# Patient Record
Sex: FEMALE | Race: BLACK OR AFRICAN AMERICAN | Employment: FULL TIME | URBAN - METROPOLITAN AREA
[De-identification: names, ages, dates, MRNs, and addresses within clinical notes are randomized per-mention and may not be internally consistent; named-entity substitution may affect disease eponyms.]

---

## 2017-01-16 ENCOUNTER — OFFICE VISIT (OUTPATIENT)
Dept: OBGYN CLINIC | Age: 52
End: 2017-01-16

## 2017-01-16 VITALS
HEIGHT: 70 IN | SYSTOLIC BLOOD PRESSURE: 118 MMHG | WEIGHT: 229.6 LBS | BODY MASS INDEX: 32.87 KG/M2 | DIASTOLIC BLOOD PRESSURE: 74 MMHG

## 2017-01-16 DIAGNOSIS — Z71.1 WORRIED WELL: Primary | ICD-10-CM

## 2017-01-16 DIAGNOSIS — R63.5 WEIGHT GAIN: ICD-10-CM

## 2017-01-16 LAB
HCG URINE, QL. (POC): NEGATIVE
VALID INTERNAL CONTROL?: YES

## 2017-01-16 NOTE — PROGRESS NOTES
Chief Complaint   Menstrual Problem      HPI  Zaira Cabral is a 49 y.o. female who presents for the evaluation of missed period, menopausal symptoms, and weight gain. Patient requested pregnancy test due to missing some birth control pills. Pregnancy test negative, 1/16/17   Patient's last menstrual period was 10/15/2016 (approximate). Per patient sometime in October, uncertain of date. The patient complains of not feeling like herself. She is doing great on the OC - no bleeding. She has noted hair loss. Has not had TSH checked. 18# weight gain since August    Past Medical History   Diagnosis Date    Anemia     Hypertension     Menorrhagia      Past Surgical History   Procedure Laterality Date    Hx gastric bypass  2005     Social History     Occupational History    Not on file. Social History Main Topics    Smoking status: Never Smoker    Smokeless tobacco: Never Used    Alcohol use No    Drug use: No    Sexual activity: Yes     Partners: Male     Birth control/ protection: Pill     Family History   Problem Relation Age of Onset    Hypertension Mother     Diabetes Mother     Cancer Father      prostate    Breast Cancer Paternal Aunt        No Known Allergies  Prior to Admission medications    Medication Sig Start Date End Date Taking? Authorizing Provider   norethindrone-e estradiol-iron (LOMEDIA 24 FE) 1 mg-20 mcg (24)/75 mg (4) tab Take 1 Tab by mouth daily. Start pill on 9/4/2016 8/26/16  Yes Darryle Aquas, MD   topiramate (TOPAMAX) 25 mg tablet Take 1 Tab by mouth daily (with dinner). 6/28/16  Yes Barbie Dalal MD   ferrous sulfate (IRON) 325 mg (65 mg iron) EC tablet Take 1 Tab by mouth three (3) times daily (with meals). 4/6/16  Yes Barbie Dalal MD   triamterene-hydrochlorothiazide (MAXZIDE) 75-50 mg per tablet Take 1 Tab by mouth daily.  3/29/16  Yes Barbie Dalal MD        Review of Systems: History obtained from the patient  Constitutional: negative for weight loss, fever, night sweats  Breast: negative for breast lumps, nipple discharge, galactorrhea  GI: negative for change in bowel habits, abdominal pain, black or bloody stools  : negative for frequency, dysuria, hematuria, vaginal discharge  MSK: negative for back pain, joint pain, muscle pain  Skin: negative for itching, rash, hives  Psych: negative for anxiety, depression, change in mood      Objective:  Visit Vitals    /74    Ht 5' 10\" (1.778 m)    Wt 229 lb 9.6 oz (104.1 kg)    LMP 10/15/2016 (Approximate)    BMI 32.94 kg/m2       Physical Exam:   PHYSICAL EXAMINATION    Constitutional  · Appearance: well-nourished, well developed, alert, in no acute distress    Skin  · General Inspection: no rash, no lesions identified    Neurologic/Psychiatric  · Mental Status:  · Orientation: grossly oriented to person, place and time  · Mood and Affect: mood normal, affect appropriate  Results for orders placed or performed in visit on 01/16/17   AMB POC URINE PREGNANCY TEST, VISUAL COLOR COMPARISON   Result Value Ref Range    VALID INTERNAL CONTROL POC Yes     HCG urine, Ql. (POC) Negative Negative       Assessment:   Worried well  Weight gain  Plan:   TSH  Cont OC - doing great  Advised many of her sx are age related and OC will cover up all menopausal sx      RTO prn if symptoms persist or worsen. Instructions given to pt. Handouts given to pt.     15 minutes was spent face to face with patient and >50% was spent counseling

## 2017-01-17 LAB — TSH SERPL DL<=0.005 MIU/L-ACNC: 0.92 UIU/ML (ref 0.45–4.5)

## 2017-01-20 ENCOUNTER — TELEPHONE (OUTPATIENT)
Dept: OBGYN CLINIC | Age: 52
End: 2017-01-20

## 2017-01-20 NOTE — TELEPHONE ENCOUNTER
Pt called to obtain her TSH results. Results were given per MD recommendation. Pt stated she will contact  through [address] because she was not understanding the results. I acknowledged understanding.

## 2017-03-21 ENCOUNTER — OFFICE VISIT (OUTPATIENT)
Dept: FAMILY MEDICINE CLINIC | Age: 52
End: 2017-03-21

## 2017-03-21 VITALS
OXYGEN SATURATION: 98 % | HEIGHT: 70 IN | RESPIRATION RATE: 18 BRPM | HEART RATE: 100 BPM | BODY MASS INDEX: 33.79 KG/M2 | WEIGHT: 236 LBS | DIASTOLIC BLOOD PRESSURE: 71 MMHG | TEMPERATURE: 98 F | SYSTOLIC BLOOD PRESSURE: 110 MMHG

## 2017-03-21 DIAGNOSIS — D50.0 IRON DEFICIENCY ANEMIA DUE TO CHRONIC BLOOD LOSS: ICD-10-CM

## 2017-03-21 DIAGNOSIS — Z13.220 SCREENING FOR HYPERLIPIDEMIA: ICD-10-CM

## 2017-03-21 DIAGNOSIS — Z98.84 STATUS POST GASTRIC BYPASS FOR OBESITY: ICD-10-CM

## 2017-03-21 DIAGNOSIS — I10 ESSENTIAL HYPERTENSION: ICD-10-CM

## 2017-03-21 DIAGNOSIS — L91.0 KELOID: Primary | ICD-10-CM

## 2017-03-21 RX ORDER — TRIAMTERENE AND HYDROCHLOROTHIAZIDE 75; 50 MG/1; MG/1
1 TABLET ORAL DAILY
Qty: 90 TAB | Refills: 3 | Status: SHIPPED | OUTPATIENT
Start: 2017-03-21 | End: 2018-01-05 | Stop reason: SDUPTHER

## 2017-03-21 RX ORDER — FERROUS SULFATE 325(65) MG
325 TABLET, DELAYED RELEASE (ENTERIC COATED) ORAL
Qty: 90 TAB | Refills: 3 | Status: SHIPPED | OUTPATIENT
Start: 2017-03-21 | End: 2018-03-31 | Stop reason: SDUPTHER

## 2017-03-21 NOTE — PROGRESS NOTES
1. Have you been to the ER, urgent care clinic since your last visit? Hospitalized since your last visit? No    2. Have you seen or consulted any other health care providers outside of the [address] [place] [address] since your last visit? Include any pap smears or colon screening.  No   Chief Complaint   Patient presents with    Medication Refill    Referral Request

## 2017-03-21 NOTE — MR AVS SNAPSHOT
Visit Information Date & Time Provider Department Dept. Phone Encounter #  
 3/21/2017  2:45 PM Tiesha Young MD [address] [place] [address] [id number] Follow-up Instructions Return in about 3 months (around 6/21/2017). Your Appointments 4/28/2017  7:40 AM  
MAMMOGRAPHY with MAMMOGRAM, KARTHIK Brooks ([facility]) Appt Note: Mammo + Dr. Rangel Carrolldle [address] [place] [address]  
  
    
 4/28/2017  8:00 AM  
ESTABLISHED PATIENT with MD Chang Ames ([facility]) Appt Note: Mammo + Dr. Rangel Carrolldle [address] [facility] [id number]  
[facility] [address] Upcoming Health Maintenance Date Due DTaP/Tdap/Td series (1 - Tdap) 8/22/1987 INFLUENZA AGE 9 TO ADULT 8/1/2016 FOBT Q 1 YEAR AGE 50-75 8/22/2016 BREAST CANCER SCRN MAMMOGRAM 4/22/2017 PAP AKA CERVICAL CYTOLOGY 4/22/2021 Allergies as of 3/21/2017  Review Complete On: 3/21/2017 By: Tiesha Young MD  
 No Known Allergies Current Immunizations  Reviewed on 7/31/2016 No immunizations on file. Not reviewed this visit You Were Diagnosed With   
  
 Codes Comments Keloid    -  Primary ICD-10-CM: L91.0 ICD-9-CM: 701.4 Iron deficiency anemia due to chronic blood loss     ICD-10-CM: D50.0 ICD-9-CM: 280.0 Essential hypertension     ICD-10-CM: I10 
ICD-9-CM: 401.9 Status post gastric bypass for obesity     ICD-10-CM: Z98.84 ICD-9-CM: V45.86 Screening for hyperlipidemia     ICD-10-CM: Z13.220 ICD-9-CM: V77.91 Vitals BP Pulse Temp Resp Height(growth percentile) Weight(growth percentile)  110/71 (BP 1 Location: Right arm, BP Patient Position: Sitting) 100 98 °F (36.7 °C) (Oral) 18 5' 10\" (1.778 m) 236 lb (107 kg) SpO2 BMI OB Status Smoking Status 98% 33.86 kg/m2 Unknown Never Smoker BMI and BSA Data Body Mass Index Body Surface Area  
 33.86 kg/m 2 2.3 m 2 Preferred Pharmacy Pharmacy Name Phone [facility] [address] [place] [address] [phone] Your Updated Medication List  
  
   
This list is accurate as of: 3/21/17  3:37 PM.  Always use your most recent med list.  
  
  
  
  
 ferrous sulfate 325 mg (65 mg iron) EC tablet Commonly known as:  IRON Take 1 Tab by mouth three (3) times daily (with meals). norethindrone-e estradiol-iron 1 mg-20 mcg (24)/75 mg (4) Tab Commonly known as:  LOMEDIA 24 FE Take 1 Tab by mouth daily. topiramate 25 mg tablet Commonly known as:  TOPAMAX Take 1 Tab by mouth daily (with dinner). triamterene-hydroCHLOROthiazide 75-50 mg per tablet Commonly known as:  Audria Abed Take 1 Tab by mouth daily. Prescriptions Sent to Pharmacy Refills  
 ferrous sulfate (IRON) 325 mg (65 mg iron) EC tablet 3 Sig: Take 1 Tab by mouth three (3) times daily (with meals). Class: Normal  
 Pharmacy: [facility] [address] [place] [address] Ph #: [phone] Route: Oral  
 triamterene-hydroCHLOROthiazide (MAXZIDE) 75-50 mg per tablet 3 Sig: Take 1 Tab by mouth daily. Class: Normal  
 [facility] [address] [place] [address] Ph #: [phone] Route: Oral  
  
We Performed the Following CBC WITH AUTOMATED DIFF [34606 CPT(R)] LIPID PANEL [04329 CPT(R)] REFERRAL TO DERMATOLOGY [REF19 Custom] Comments:  
 eval and treat for keloids Follow-up Instructions Return in about 3 months (around 6/21/2017). Referral Information Referral ID Referred By Referred To  
  
 [id number] Terence soto [facility] W Diana Pollack MD   
   [address] [place] [address] [address] [facility], [address] Phone: [phone] Fax: [phone] Visits Status Start Date End Date 1 New Request 3/21/17 3/21/18 If your referral has a status of pending review or denied, additional information will be sent to support the outcome of this decision. Introducing [facility] SERVICES! Dear Lelo Nunes: Thank you for requesting a [facility] account. Our records indicate that you already have an active [facility] account. You can access your account anytime at [email or web address] Did you know that you can access your hospital and ER discharge instructions at any time in [facility]? You can also review all of your test results from your hospital stay or ER visit. Additional Information If you have questions, please visit the Frequently Asked Questions section of the [employer] [facility] website at [email or web address] Remember, [facility] is NOT to be used for urgent needs. For medical emergencies, dial 911. Now available from your iPhone and Android! Please provide this summary of care documentation to your next provider. Your primary care clinician is listed as Sharath Gonzalez. If you have any questions after today's visit, please call [phone].

## 2017-03-21 NOTE — PROGRESS NOTES
Chief Complaint   Patient presents with    Medication Refill    Referral Request     she is a 49y.o. year old female who presents for evalution. She needs a refill on  Also asking for a referral to derm for keloids    Reviewed PmHx, RxHx, FmHx, SocHx, AllgHx and updated and dated in the chart. Patient Active Problem List    Diagnosis    Uterine leiomyoma    Essential hypertension    Status post gastric bypass for obesity    Cheloid scar       Nurse notes were reviewed and copied and are correct  Review of Systems - negative except as listed above in the HPI    Objective:     Vitals:    03/21/17 1505   BP: 110/71   Pulse: 100   Resp: 18   Temp: 98 °F (36.7 °C)   TempSrc: Oral   SpO2: 98%   Weight: 236 lb (107 kg)   Height: 5' 10\" (1.778 m)       Physical Examination: General appearance - alert, well appearing, and in no distress  Mental status - alert, oriented to person, place, and time  Eye  perrl  Chest - clear to auscultation, no wheezes, rales or rhonchi, symmetric air entry  Heart - normal rate, regular rhythm, normal S1, S2, no murmurs, rubs, clicks or gallops  Skin - multiple keloids on the abdomen  Ext: no edema  Msk: no deformity  Assessment/ Plan:   Lelo Nunes was seen today for medication refill and referral request.    Diagnoses and all orders for this visit:    Keloid  -     REFERRAL TO DERMATOLOGY    Iron deficiency anemia due to chronic blood loss  -     ferrous sulfate (IRON) 325 mg (65 mg iron) EC tablet; Take 1 Tab by mouth three (3) times daily (with meals). -     CBC WITH AUTOMATED DIFF    Essential hypertension  -     triamterene-hydroCHLOROthiazide (MAXZIDE) 75-50 mg per tablet; Take 1 Tab by mouth daily. Status post gastric bypass for obesity  She is not working on the weight loss now  Screening for hyperlipidemia  -     LIPID PANEL       Follow-up Disposition:  Return in about 3 months (around 6/21/2017). ICD-10-CM ICD-9-CM    1. Keloid L91.0 701.4 REFERRAL TO DERMATOLOGY   2. Iron deficiency anemia due to chronic blood loss D50.0 280.0 ferrous sulfate (IRON) 325 mg (65 mg iron) EC tablet      CBC WITH AUTOMATED DIFF   3. Essential hypertension I10 401.9 triamterene-hydroCHLOROthiazide (MAXZIDE) 75-50 mg per tablet   4. Status post gastric bypass for obesity Z98.84 V45.86    5. Screening for hyperlipidemia Z13.220 V77.91 LIPID PANEL       I have discussed the diagnosis with the patient and the intended plan as seen in the above orders. The patient has received an after-visit summary and questions were answered concerning future plans. Medication Side Effects and Warnings were discussed with patient: yes  Patient Labs were reviewed and or requested: yes  Patient Past Records were reviewed and or requested: yes        There are no Patient Instructions on file for this visit.     The patient verbalizes understanding and agrees with the plan of care        Patient has the advanced directives booklet to review

## 2017-03-22 LAB
BASOPHILS # BLD AUTO: 0 X10E3/UL (ref 0–0.2)
BASOPHILS NFR BLD AUTO: 0 %
CHOLEST SERPL-MCNC: 159 MG/DL (ref 100–199)
EOSINOPHIL # BLD AUTO: 0.1 X10E3/UL (ref 0–0.4)
EOSINOPHIL NFR BLD AUTO: 1 %
ERYTHROCYTE [DISTWIDTH] IN BLOOD BY AUTOMATED COUNT: 14.4 % (ref 12.3–15.4)
HCT VFR BLD AUTO: 41.1 % (ref 34–46.6)
HDLC SERPL-MCNC: 60 MG/DL
HGB BLD-MCNC: 13.5 G/DL (ref 11.1–15.9)
IMM GRANULOCYTES # BLD: 0 X10E3/UL (ref 0–0.1)
IMM GRANULOCYTES NFR BLD: 0 %
INTERPRETATION, 910389: NORMAL
LDLC SERPL CALC-MCNC: 82 MG/DL (ref 0–99)
LYMPHOCYTES # BLD AUTO: 1.8 X10E3/UL (ref 0.7–3.1)
LYMPHOCYTES NFR BLD AUTO: 24 %
MCH RBC QN AUTO: 29 PG (ref 26.6–33)
MCHC RBC AUTO-ENTMCNC: 32.8 G/DL (ref 31.5–35.7)
MCV RBC AUTO: 88 FL (ref 79–97)
MONOCYTES # BLD AUTO: 0.4 X10E3/UL (ref 0.1–0.9)
MONOCYTES NFR BLD AUTO: 5 %
NEUTROPHILS # BLD AUTO: 5.1 X10E3/UL (ref 1.4–7)
NEUTROPHILS NFR BLD AUTO: 70 %
PLATELET # BLD AUTO: 311 X10E3/UL (ref 150–379)
RBC # BLD AUTO: 4.66 X10E6/UL (ref 3.77–5.28)
TRIGL SERPL-MCNC: 83 MG/DL (ref 0–149)
VLDLC SERPL CALC-MCNC: 17 MG/DL (ref 5–40)
WBC # BLD AUTO: 7.4 X10E3/UL (ref 3.4–10.8)

## 2017-06-14 ENCOUNTER — OFFICE VISIT (OUTPATIENT)
Dept: FAMILY MEDICINE CLINIC | Age: 52
End: 2017-06-14

## 2017-06-14 VITALS
RESPIRATION RATE: 14 BRPM | HEART RATE: 77 BPM | HEIGHT: 70 IN | TEMPERATURE: 98.2 F | OXYGEN SATURATION: 96 % | WEIGHT: 231 LBS | SYSTOLIC BLOOD PRESSURE: 133 MMHG | DIASTOLIC BLOOD PRESSURE: 58 MMHG | BODY MASS INDEX: 33.07 KG/M2

## 2017-06-14 DIAGNOSIS — N91.2 AMENORRHEA: ICD-10-CM

## 2017-06-14 DIAGNOSIS — E55.9 VITAMIN D DEFICIENCY: ICD-10-CM

## 2017-06-14 DIAGNOSIS — R63.5 WEIGHT GAIN: ICD-10-CM

## 2017-06-14 DIAGNOSIS — M54.2 POSTERIOR NECK PAIN: ICD-10-CM

## 2017-06-14 DIAGNOSIS — I10 ESSENTIAL HYPERTENSION: ICD-10-CM

## 2017-06-14 DIAGNOSIS — R53.83 FATIGUE, UNSPECIFIED TYPE: Primary | ICD-10-CM

## 2017-06-14 LAB
HCG URINE, QL. (POC): NEGATIVE
VALID INTERNAL CONTROL?: YES

## 2017-06-14 RX ORDER — NAPROXEN 500 MG/1
500 TABLET ORAL 2 TIMES DAILY WITH MEALS
Qty: 30 TAB | Refills: 1 | Status: SHIPPED | OUTPATIENT
Start: 2017-06-14 | End: 2017-06-21

## 2017-06-14 RX ORDER — CYCLOBENZAPRINE HCL 5 MG
5 TABLET ORAL
Qty: 20 TAB | Refills: 0 | Status: SHIPPED | OUTPATIENT
Start: 2017-06-14 | End: 2017-09-26

## 2017-06-14 NOTE — PATIENT INSTRUCTIONS
Neck Pain: Care Instructions  Your Care Instructions  You can have neck pain anywhere from the bottom of your head to the top of your shoulders. It can spread to the upper back or arms. Injuries, painting a ceiling, sleeping with your neck twisted, staying in one position for too long, and many other activities can cause neck pain. Most neck pain gets better with home care. Your doctor may recommend medicine to relieve pain or relax your muscles. He or she may suggest exercise and physical therapy to increase flexibility and relieve stress. You may need to wear a special (cervical) collar to support your neck for a day or two. Follow-up care is a key part of your treatment and safety. Be sure to make and go to all appointments, and call your doctor if you are having problems. It's also a good idea to know your test results and keep a list of the medicines you take. How can you care for yourself at home? · Try using a heating pad on a low or medium setting for 15 to 20 minutes every 2 or 3 hours. Try a warm shower in place of one session with the heating pad. · You can also try an ice pack for 10 to 15 minutes every 2 to 3 hours. Put a thin cloth between the ice and your skin. · Take pain medicines exactly as directed. ¨ If the doctor gave you a prescription medicine for pain, take it as prescribed. ¨ If you are not taking a prescription pain medicine, ask your doctor if you can take an over-the-counter medicine. · If your doctor recommends a cervical collar, wear it exactly as directed. When should you call for help? Call your doctor now or seek immediate medical care if:  · You have new or worsening numbness in your arms, buttocks or legs. · You have new or worsening weakness in your arms or legs. (This could make it hard to stand up.)  · You lose control of your bladder or bowels.   Watch closely for changes in your health, and be sure to contact your doctor if:  · Your neck pain is getting worse.  · You are not getting better after 1 week. · You do not get better as expected. Where can you learn more? Go to http://zeyad-cecile.info/. Enter [id number] in the search box to learn more about \"Neck Pain: Care Instructions. \"  Current as of: May 23, 2016  Content Version: 11.2  © 4092-5547 [employer]. Care instructions adapted under license by [employer] (which disclaims liability or warranty for this information). If you have questions about a medical condition or this instruction, always ask your healthcare professional. [place] [address] any warranty or liability for your use of this information. Fatigue: Care Instructions  Your Care Instructions  Fatigue is a feeling of tiredness, exhaustion, or lack of energy. You may feel fatigue because of too much or not enough activity. It can also come from stress, lack of sleep, boredom, and poor diet. Many medical problems, such as viral infections, can cause fatigue. Emotional problems, especially depression, are often the cause of fatigue. Fatigue is most often a symptom of another problem. Treatment for fatigue depends on the cause. For example, if you have fatigue because you have a certain health problem, treating this problem also treats your fatigue. If depression or anxiety is the cause, treatment may help. Follow-up care is a key part of your treatment and safety. Be sure to make and go to all appointments, and call your doctor if you are having problems. It's also a good idea to know your test results and keep a list of the medicines you take. How can you care for yourself at home? · Get regular exercise. But don't overdo it. Go back and forth between rest and exercise. · Get plenty of rest.  · Eat a healthy diet. Do not skip meals, especially breakfast.  · Reduce your use of caffeine, tobacco, and alcohol.  Caffeine is most often found in coffee, tea, cola drinks, and chocolate. · Limit medicines that can cause fatigue. This includes tranquilizers and cold and allergy medicines. When should you call for help? Watch closely for changes in your health, and be sure to contact your doctor if:  · You have new symptoms such as fever or a rash. · Your fatigue gets worse. · You have been feeling down, depressed, or hopeless. Or you may have lost interest in things that you usually enjoy. · You are not getting better as expected. Where can you learn more? Go to http://zeyad-cecile.info/. Enter Q718 in the search box to learn more about \"Fatigue: Care Instructions. \"  Current as of: May 27, 2016  Content Version: 11.2  © 4706-8872 [employer]. Care instructions adapted under license by [employer] (which disclaims liability or warranty for this information). If you have questions about a medical condition or this instruction, always ask your healthcare professional. [place] [address] any warranty or liability for your use of this information. Starting a Weight Loss Plan: Care Instructions  Your Care Instructions  If you are thinking about losing weight, it can be hard to know where to start. Your doctor can help you set up a weight loss plan that best meets your needs. You may want to take a class on nutrition or exercise, or join a weight loss support group. If you have questions about how to make changes to your eating or exercise habits, ask your doctor about seeing a registered dietitian or an exercise specialist.  It can be a big challenge to lose weight. But you do not have to make huge changes at once. Make small changes, and stick with them. When those changes become habit, add a few more changes. If you do not think you are ready to make changes right now, try to pick a date in the future. Make an appointment to see your doctor to discuss whether the time is right for you to start a plan.   Follow-up care is a key part of your treatment and safety. Be sure to make and go to all appointments, and call your doctor if you are having problems. Its also a good idea to know your test results and keep a list of the medicines you take. How can you care for yourself at home? · Set realistic goals. Many people expect to lose much more weight than is likely. A weight loss of 5% to 10% of your body weight may be enough to improve your health. · Get family and friends involved to provide support. Talk to them about why you are trying to lose weight, and ask them to help. They can help by participating in exercise and having meals with you, even if they may be eating something different. · Find what works best for you. If you do not have time or do not like to cook, a program that offers meal replacement bars or shakes may be better for you. Or if you like to prepare meals, finding a plan that includes daily menus and recipes may be best.  · Ask your doctor about other health professionals who can help you achieve your weight loss goals. ¨ A dietitian can help you make healthy changes in your diet. ¨ An exercise specialist or  can help you develop a safe and effective exercise program.  ¨ A counselor or psychiatrist can help you cope with issues such as depression, anxiety, or family problems that can make it hard to focus on weight loss. · Consider joining a support group for people who are trying to lose weight. Your doctor can suggest groups in your area. Where can you learn more? Go to http://zeyad-cecile.info/. Enter N439 in the search box to learn more about \"Starting a Weight Loss Plan: Care Instructions. \"  Current as of: October 13, 2016  Content Version: 11.2  © 6508-1817 [employer]. Care instructions adapted under license by [employer] (which disclaims liability or warranty for this information).  If you have questions about a medical condition or this instruction, always ask your healthcare professional. [place] [address] any warranty or liability for your use of this information.

## 2017-06-14 NOTE — PROGRESS NOTES
Reports that she has been experiencing fatigue, since Saturday. Also states that she started having (R) shoulder pain early last week that has radiated down her back and causes pain when she turns her head. X 4 days. Reports (R) ear pain and scratchy throat.

## 2017-06-14 NOTE — MR AVS SNAPSHOT
Visit Information Date & Time Provider Department Dept. Phone Encounter #  
 6/14/2017 11:15 AM Loulou Madrid NP [address] [place] [address] [id number] Follow-up Instructions Return in about 2 weeks (around 6/28/2017) for f/u neck pain. Upcoming Health Maintenance Date Due DTaP/Tdap/Td series (1 - Tdap) 8/22/1987 FOBT Q 1 YEAR AGE 50-75 8/22/2016 BREAST CANCER SCRN MAMMOGRAM 4/22/2017 INFLUENZA AGE 9 TO ADULT 8/1/2017 PAP AKA CERVICAL CYTOLOGY 4/22/2021 Allergies as of 6/14/2017  Review Complete On: 6/14/2017 By: Loulou Madrid NP No Known Allergies Current Immunizations  Reviewed on 7/31/2016 No immunizations on file. Not reviewed this visit You Were Diagnosed With   
  
 Codes Comments Fatigue, unspecified type    -  Primary ICD-10-CM: R53.83 ICD-9-CM: 780.79 BMI 33.0-33.9,adult     ICD-10-CM: P67.32 
ICD-9-CM: V85.33 Essential hypertension     ICD-10-CM: I10 
ICD-9-CM: 401.9 Posterior neck pain     ICD-10-CM: M54.2 ICD-9-CM: 723.1 Amenorrhea     ICD-10-CM: N91.2 ICD-9-CM: 626.0 Weight gain     ICD-10-CM: R63.5 ICD-9-CM: 783.1 Vitals BP Pulse Temp Resp Height(growth percentile) Weight(growth percentile) 133/58 77 98.2 °F (36.8 °C) 14 5' 10\" (1.778 m) 231 lb (104.8 kg) SpO2 BMI OB Status Smoking Status 96% 33.15 kg/m2 Unknown Never Smoker Vitals History BMI and BSA Data Body Mass Index Body Surface Area  
 33.15 kg/m 2 2.28 m 2 Preferred Pharmacy Pharmacy Name Phone [facility] [address], [address] [phone] Your Updated Medication List  
  
   
This list is accurate as of: 6/14/17 12:01 PM.  Always use your most recent med list.  
  
  
  
  
 cyclobenzaprine 5 mg tablet Commonly known as:  FLEXERIL Take 1 Tab by mouth nightly as needed for Muscle Spasm(s). ferrous sulfate 325 mg (65 mg iron) EC tablet Commonly known as:  IRON Take 1 Tab by mouth three (3) times daily (with meals). naproxen 500 mg tablet Commonly known as:  NAPROSYN Take 1 Tab by mouth two (2) times daily (with meals) for 7 days. norethindrone-e estradiol-iron 1 mg-20 mcg (24)/75 mg (4) Tab Commonly known as:  LOMEDIA 24 FE Take 1 Tab by mouth daily. topiramate 25 mg tablet Commonly known as:  TOPAMAX Take 1 Tab by mouth daily (with dinner). triamterene-hydroCHLOROthiazide 75-50 mg per tablet Commonly known as:  Wimberley Hotter Take 1 Tab by mouth daily. Prescriptions Sent to Pharmacy Refills  
 naproxen (NAPROSYN) 500 mg tablet 1 Sig: Take 1 Tab by mouth two (2) times daily (with meals) for 7 days. Class: Normal  
 Pharmacy: [facility] [address] [place] [address], [address] Ph #: [phone] Route: Oral  
 cyclobenzaprine (FLEXERIL) 5 mg tablet 0 Sig: Take 1 Tab by mouth nightly as needed for Muscle Spasm(s). Class: Normal  
 Pharmacy: [facility] [address] [place] [address], [address] Ph #: [phone] Route: Oral  
  
We Performed the Following AMB POC URINE PREGNANCY TEST, VISUAL COLOR COMPARISON [82081 CPT(R)] CBC WITH AUTOMATED DIFF [18560 CPT(R)] SLEEP MEDICINE REFERRAL [[id number] Custom] Comments:  
 Please evaluate patient for poor sleep pattern. TSH 3RD GENERATION [64749 CPT(R)] VITAMIN B12 A504410 CPT(R)] VITAMIN D, 25 HYDROXY C7322477 CPT(R)] Follow-up Instructions Return in about 2 weeks (around 6/28/2017) for f/u neck pain. Referral Information Referral ID Referred By Referred To  
  
 [id number] Violeta Woodall, [facility] Korey Ferreira  Phone: [phone] Fax: [phone] Visits Status Start Date End Date 1 New Request 6/14/17 6/14/18 If your referral has a status of pending review or denied, additional information will be sent to support the outcome of this decision. Patient Instructions Neck Pain: Care Instructions Your Care Instructions You can have neck pain anywhere from the bottom of your head to the top of your shoulders. It can spread to the upper back or arms. Injuries, painting a ceiling, sleeping with your neck twisted, staying in one position for too long, and many other activities can cause neck pain. Most neck pain gets better with home care. Your doctor may recommend medicine to relieve pain or relax your muscles. He or she may suggest exercise and physical therapy to increase flexibility and relieve stress. You may need to wear a special (cervical) collar to support your neck for a day or two. Follow-up care is a key part of your treatment and safety. Be sure to make and go to all appointments, and call your doctor if you are having problems. It's also a good idea to know your test results and keep a list of the medicines you take. How can you care for yourself at home? · Try using a heating pad on a low or medium setting for 15 to 20 minutes every 2 or 3 hours. Try a warm shower in place of one session with the heating pad. · You can also try an ice pack for 10 to 15 minutes every 2 to 3 hours. Put a thin cloth between the ice and your skin. · Take pain medicines exactly as directed. ¨ If the doctor gave you a prescription medicine for pain, take it as prescribed. ¨ If you are not taking a prescription pain medicine, ask your doctor if you can take an over-the-counter medicine. · If your doctor recommends a cervical collar, wear it exactly as directed. When should you call for help? Call your doctor now or seek immediate medical care if: 
· You have new or worsening numbness in your arms, buttocks or legs. · You have new or worsening weakness in your arms or legs.  (This could make it hard to stand up.) · You lose control of your bladder or bowels. Watch closely for changes in your health, and be sure to contact your doctor if: 
· Your neck pain is getting worse. · You are not getting better after 1 week. · You do not get better as expected. Where can you learn more? Go to http://zeyad-cecile.info/. Enter [id number] in the search box to learn more about \"Neck Pain: Care Instructions. \" Current as of: May 23, 2016 Content Version: 11.2 © 0215-2239 Public Good Software. Care instructions adapted under license by [employer] IT (which disclaims liability or warranty for this information). If you have questions about a medical condition or this instruction, always ask your healthcare professional. [address] any warranty or liability for your use of this information. Fatigue: Care Instructions Your Care Instructions Fatigue is a feeling of tiredness, exhaustion, or lack of energy. You may feel fatigue because of too much or not enough activity. It can also come from stress, lack of sleep, boredom, and poor diet. Many medical problems, such as viral infections, can cause fatigue. Emotional problems, especially depression, are often the cause of fatigue. Fatigue is most often a symptom of another problem. Treatment for fatigue depends on the cause. For example, if you have fatigue because you have a certain health problem, treating this problem also treats your fatigue. If depression or anxiety is the cause, treatment may help. Follow-up care is a key part of your treatment and safety. Be sure to make and go to all appointments, and call your doctor if you are having problems. It's also a good idea to know your test results and keep a list of the medicines you take. How can you care for yourself at home? · Get regular exercise. But don't overdo it. Go back and forth between rest and exercise.  
· Get plenty of rest. 
 · Eat a healthy diet. Do not skip meals, especially breakfast. 
· Reduce your use of caffeine, tobacco, and alcohol. Caffeine is most often found in coffee, tea, cola drinks, and chocolate. · Limit medicines that can cause fatigue. This includes tranquilizers and cold and allergy medicines. When should you call for help? Watch closely for changes in your health, and be sure to contact your doctor if: 
· You have new symptoms such as fever or a rash. · Your fatigue gets worse. · You have been feeling down, depressed, or hopeless. Or you may have lost interest in things that you usually enjoy. · You are not getting better as expected. Where can you learn more? Go to http://zeyad-cecile.info/. Enter P124 in the search box to learn more about \"Fatigue: Care Instructions. \" Current as of: May 27, 2016 Content Version: 11.2 © 8968-3808 [employer]Loop. Care instructions adapted under license by [employer] (which disclaims liability or warranty for this information). If you have questions about a medical condition or this instruction, always ask your healthcare professional. [place] [address] any warranty or liability for your use of this information. Starting a Weight Loss Plan: Care Instructions Your Care Instructions If you are thinking about losing weight, it can be hard to know where to start. Your doctor can help you set up a weight loss plan that best meets your needs. You may want to take a class on nutrition or exercise, or join a weight loss support group. If you have questions about how to make changes to your eating or exercise habits, ask your doctor about seeing a registered dietitian or an exercise specialist. 
It can be a big challenge to lose weight. But you do not have to make huge changes at once. Make small changes, and stick with them. When those changes become habit, add a few more changes. If you do not think you are ready to make changes right now, try to pick a date in the future. Make an appointment to see your doctor to discuss whether the time is right for you to start a plan. Follow-up care is a key part of your treatment and safety. Be sure to make and go to all appointments, and call your doctor if you are having problems. Its also a good idea to know your test results and keep a list of the medicines you take. How can you care for yourself at home? · Set realistic goals. Many people expect to lose much more weight than is likely. A weight loss of 5% to 10% of your body weight may be enough to improve your health. · Get family and friends involved to provide support. Talk to them about why you are trying to lose weight, and ask them to help. They can help by participating in exercise and having meals with you, even if they may be eating something different. · Find what works best for you. If you do not have time or do not like to cook, a program that offers meal replacement bars or shakes may be better for you. Or if you like to prepare meals, finding a plan that includes daily menus and recipes may be best. 
· Ask your doctor about other health professionals who can help you achieve your weight loss goals. ¨ A dietitian can help you make healthy changes in your diet. ¨ An exercise specialist or  can help you develop a safe and effective exercise program. 
¨ A counselor or psychiatrist can help you cope with issues such as depression, anxiety, or family problems that can make it hard to focus on weight loss. · Consider joining a support group for people who are trying to lose weight. Your doctor can suggest groups in your area. Where can you learn more? Go to http://zeyad-cecile.info/. Enter C337 in the search box to learn more about \"Starting a Weight Loss Plan: Care Instructions. \" Current as of: October 13, 2016 Content Version: 11.2 © 9430-8607 Healthwise, Incorporated. Care instructions adapted under license by Loudie (which disclaims liability or warranty for this information). If you have questions about a medical condition or this instruction, always ask your healthcare professional. [address] any warranty or liability for your use of this information. Introducing [facility]! Dear Jose Hawley: Thank you for requesting a VitalFields account. Our records indicate that you already have an active VitalFields account. You can access your account anytime at [email or web address] Did you know that you can access your hospital and ER discharge instructions at any time in VitalFields? You can also review all of your test results from your hospital stay or ER visit. Additional Information If you have questions, please visit the Frequently Asked Questions section of the VitalFields website at [email or web address] Remember, VitalFields is NOT to be used for urgent needs. For medical emergencies, dial 911. Now available from your iPhone and Android! Please provide this summary of care documentation to your next provider. Your primary care clinician is listed as Beba Bunch. If you have any questions after today's visit, please call [phone].

## 2017-06-15 LAB
25(OH)D3+25(OH)D2 SERPL-MCNC: 10.2 NG/ML (ref 30–100)
BASOPHILS # BLD AUTO: 0.1 X10E3/UL (ref 0–0.2)
BASOPHILS NFR BLD AUTO: 1 %
EOSINOPHIL # BLD AUTO: 0.1 X10E3/UL (ref 0–0.4)
EOSINOPHIL NFR BLD AUTO: 2 %
ERYTHROCYTE [DISTWIDTH] IN BLOOD BY AUTOMATED COUNT: 14.5 % (ref 12.3–15.4)
HCT VFR BLD AUTO: 39.8 % (ref 34–46.6)
HGB BLD-MCNC: 13 G/DL (ref 11.1–15.9)
IMM GRANULOCYTES # BLD: 0 X10E3/UL (ref 0–0.1)
IMM GRANULOCYTES NFR BLD: 0 %
LYMPHOCYTES # BLD AUTO: 1.6 X10E3/UL (ref 0.7–3.1)
LYMPHOCYTES NFR BLD AUTO: 19 %
MCH RBC QN AUTO: 28.8 PG (ref 26.6–33)
MCHC RBC AUTO-ENTMCNC: 32.7 G/DL (ref 31.5–35.7)
MCV RBC AUTO: 88 FL (ref 79–97)
MONOCYTES # BLD AUTO: 0.6 X10E3/UL (ref 0.1–0.9)
MONOCYTES NFR BLD AUTO: 8 %
NEUTROPHILS # BLD AUTO: 6.1 X10E3/UL (ref 1.4–7)
NEUTROPHILS NFR BLD AUTO: 70 %
PLATELET # BLD AUTO: 305 X10E3/UL (ref 150–379)
RBC # BLD AUTO: 4.52 X10E6/UL (ref 3.77–5.28)
TSH SERPL DL<=0.005 MIU/L-ACNC: 0.9 UIU/ML (ref 0.45–4.5)
VIT B12 SERPL-MCNC: 155 PG/ML (ref 211–946)
WBC # BLD AUTO: 8.5 X10E3/UL (ref 3.4–10.8)

## 2017-06-16 ENCOUNTER — CLINICAL SUPPORT (OUTPATIENT)
Dept: FAMILY MEDICINE CLINIC | Age: 52
End: 2017-06-16

## 2017-06-16 DIAGNOSIS — E53.8 VITAMIN B 12 DEFICIENCY: Primary | ICD-10-CM

## 2017-06-16 RX ORDER — CYANOCOBALAMIN 1000 UG/ML
1000 INJECTION, SOLUTION INTRAMUSCULAR; SUBCUTANEOUS ONCE
Qty: 1 ML | Refills: 0
Start: 2017-06-16 | End: 2017-06-16

## 2017-06-16 RX ORDER — ERGOCALCIFEROL 1.25 MG/1
50000 CAPSULE ORAL
Qty: 12 CAP | Refills: 0 | Status: SHIPPED | OUTPATIENT
Start: 2017-06-16 | End: 2018-06-15 | Stop reason: SDUPTHER

## 2017-06-16 NOTE — PROGRESS NOTES
b12 is low- recommend trying injections once monthly to improve levels. Make appt for nurse visit for 1st adminstration. Vit D low- rx for weekly high dose replacement sent to pharmacy. Take x 12 weeks, then return to recheck blood levels.   Your CBC is normal.  Thyroid is also normal.

## 2017-07-14 ENCOUNTER — CLINICAL SUPPORT (OUTPATIENT)
Dept: FAMILY MEDICINE CLINIC | Age: 52
End: 2017-07-14

## 2017-07-14 DIAGNOSIS — E53.8 VITAMIN B12 DEFICIENCY: Primary | ICD-10-CM

## 2017-07-14 RX ORDER — CYANOCOBALAMIN 1000 UG/ML
1000 INJECTION, SOLUTION INTRAMUSCULAR; SUBCUTANEOUS ONCE
Qty: 1 ML | Refills: 0
Start: 2017-07-14 | End: 2017-08-28 | Stop reason: SDUPTHER

## 2017-07-14 NOTE — PROGRESS NOTES
After obtaining consent, and per orders of VINNY Lim. , injection of cyanocobalamin 1,000 mcg given by Lottie Fields LPN. Patient instructed to remain in clinic for 10 minutes afterwards, and to report any adverse reaction to me immediately.

## 2017-08-07 ENCOUNTER — TELEPHONE (OUTPATIENT)
Dept: FAMILY MEDICINE CLINIC | Age: 52
End: 2017-08-07

## 2017-08-09 ENCOUNTER — HOSPITAL ENCOUNTER (EMERGENCY)
Age: 52
Discharge: HOME | End: 2017-08-09
Attending: EMERGENCY MEDICINE
Payer: COMMERCIAL

## 2017-08-09 ENCOUNTER — HOSPITAL ENCOUNTER (EMERGENCY)
Age: 52
Discharge: HOME | End: 2017-08-09
Attending: FAMILY MEDICINE

## 2017-08-09 VITALS
WEIGHT: 237.22 LBS | HEART RATE: 79 BPM | OXYGEN SATURATION: 99 % | DIASTOLIC BLOOD PRESSURE: 67 MMHG | BODY MASS INDEX: 33.96 KG/M2 | RESPIRATION RATE: 18 BRPM | SYSTOLIC BLOOD PRESSURE: 114 MMHG | TEMPERATURE: 98 F | HEIGHT: 70 IN

## 2017-08-09 DIAGNOSIS — M71.22 BAKER CYST, LEFT: ICD-10-CM

## 2017-08-09 DIAGNOSIS — M25.562 ARTHRALGIA OF LEFT LOWER LEG: Primary | ICD-10-CM

## 2017-08-09 PROCEDURE — 93971 EXTREMITY STUDY: CPT

## 2017-08-09 PROCEDURE — 99281 EMR DPT VST MAYX REQ PHY/QHP: CPT

## 2017-08-09 NOTE — PROCEDURES
[facility]  *** FINAL REPORT ***    Name: Claudette Cardenas  MRN: [id number]    Outpatient  : 22 Aug 1965  HIS Order #: [id number]  [address] [facility] Visit #: [id number]  Date: 09 Aug 2017    TYPE OF TEST: Peripheral Venous Testing    REASON FOR TEST  Pain in limb, Limb swelling    Left Leg:-  Deep venous thrombosis:           No  Superficial venous thrombosis:    No  Deep venous insufficiency:        No  Superficial venous insufficiency: No      INTERPRETATION/FINDINGS  PROCEDURE:  LEFT LOWER EXTREMITY VENOUS DUPLEX . Evaluation of lower  extremity veins with ultrasound (B-mode imaging, pulsed Doppler, color   Doppler). Includes the common femoral, deep femoral, femoral,  popliteal, posterior tibial, peroneal, and great saphenous veins. Other veins, for example the gastrocnemius and soleal veins, may also  be visualized. FINDINGS: Technically difficult study due to patient body habitus. Gray scale imaging supoptimal. Adamaris Nyhan scale and color flow duplex images   of the veins in the left lower extremity demonstrate normal  compressibility, spontaneous and augmented flow profiles, and absence  of filling defects throughout the deep and superficial veins in the  left lower extremity. CONCLUSION: Left lower extremity venous duplex negative for deep  venous thrombosis or thrombophlebitis. Right common femoral vein is  thrombus free. NOTE: Avascular structure was noted in the left  popliteal fossa measuring 0.71 x 3.68 cm consistent with a baker's  cyst.    ADDITIONAL COMMENTS    I have personally reviewed the data relevant to the interpretation of  this  study. TECHNOLOGIST: Shahab Esteban. Dwayne  Signed: 2017 7:27:54 PM    PHYSICIAN: Hemal Garcia.  Chirag Giron MD  Signed: 8/10/2017 7:43:25 AM

## 2017-08-09 NOTE — ED TRIAGE NOTES
Pt reports going to [facility] today for left leg pain. Pain since last Thursday. Sent here to r/o blood clot. 2 wks ago was on a plane traveling. States the leg feels tight.

## 2017-08-09 NOTE — ED PROVIDER NOTES
HPI Comments: 49 y.o. female with past medical history significant for HTN, anemia, and menorrhagia who presents from [facility] with chief complaint of leg pain. Pt complains of left leg pain for the past 6 days. Pt describes feeling tightness in her left leg and reports recent travel by plane ~2 weeks PTA. Pt was seen at [facility] today and sent to [facility] to rule out DVT. There are no other acute medical concerns at this time. Social hx: No tobacco or EtOH use. PCP: Alexandr Ponce MD    Note written by Yesica. Jennifer Lucio, as dictated by Brionna Beaver MD 7:32 PM      The history is provided by the patient. No  was used. Past Medical History:   Diagnosis Date    Anemia     Hypertension     Menorrhagia        Past Surgical History:   Procedure Laterality Date    HX GASTRIC BYPASS  2005         Family History:   Problem Relation Age of Onset    Hypertension Mother     Diabetes Mother     Cancer Father      prostate    Breast Cancer Paternal Aunt        Social History     Social History    Marital status:      Spouse name: N/A    Number of children: N/A    Years of education: N/A     Occupational History    Not on file. Social History Main Topics    Smoking status: Never Smoker    Smokeless tobacco: Never Used    Alcohol use No    Drug use: No    Sexual activity: Yes     Partners: Male     Birth control/ protection: Pill     Other Topics Concern    Not on file     Social History Narrative         ALLERGIES: Review of patient's allergies indicates no known allergies. Review of Systems   Constitutional: Negative for chills and fever. HENT: Negative for ear pain and sore throat. Eyes: Negative for photophobia and pain. Respiratory: Negative for chest tightness and shortness of breath. Cardiovascular: Negative for chest pain and leg swelling. Gastrointestinal: Negative for abdominal pain, nausea and vomiting.    Genitourinary: Negative for dysuria and flank pain. Musculoskeletal: Negative for back pain and neck pain. Left leg pain   Skin: Negative for rash and wound. Neurological: Negative for dizziness, light-headedness and headaches. Vitals:    08/09/17 1805   BP: 114/67   Pulse: 79   Resp: 18   Temp: 98 °F (36.7 °C)   SpO2: 99%   Weight: 107.6 kg (237 lb 3.4 oz)   Height: 5' 10\" (1.778 m)            Physical Exam   Constitutional: She is oriented to person, place, and time. She appears well-developed and well-nourished. HENT:   Head: Normocephalic and atraumatic. Cardiovascular: Normal rate. Pulmonary/Chest: Effort normal. No respiratory distress. Musculoskeletal: She exhibits tenderness. She exhibits no deformity. Behind the left knee   Neurological: She is alert and oriented to person, place, and time. Psychiatric: She has a normal mood and affect. Nursing note and vitals reviewed.        MDM  Number of Diagnoses or Management Options  Arthralgia of left lower leg:   Baker cyst, left:   Diagnosis management comments: Patient sent to r/o DVT - doppler shows baker's cyst and no DVT  Patient d/c home to f/u with her PCP       Amount and/or Complexity of Data Reviewed  Tests in the radiology section of CPT®: reviewed and ordered      ED Course       Procedures    Results    DUPLEX LOWER EXT VENOUS LEFT (Accession [id number]) (Order [id number])         Allergies        No Known Allergies       Result Information      Status Provider Status        Preliminary result (Collected: 8/9/2017  7:27 PM) Ordered        Imaging      DUPLEX LOWER EXT VENOUS LEFT (Order #[id number]) on 8/9/2017 - Imaging Information                   External Results Report       Exam Information      Status Exam Begun    Exam Ended        Preliminary [70]           IR Summary Links      IR Procedure Log       Transcription      Type ID     Vascular Result TRK     Draft copy - this document is not available for patient care     Document Text        []Hide copied text  []Joss for attribution information     [address]  ** PRELIMINARY REPORT **     Name: Cally Allison  MRN: [id number]    Outpatient  : 22 Aug 1965  HIS Order #: [id number]  [address] [facility] Visit #: [id number]  Date: 09 Aug 2017     TYPE OF TEST: Peripheral Venous Testing     REASON FOR TEST  Pain in limb, Limb swelling     Left Leg:-  Deep venous thrombosis:           No  Superficial venous thrombosis:    No  Deep venous insufficiency:        No  Superficial venous insufficiency: No        INTERPRETATION/FINDINGS  PROCEDURE:  LEFT LOWER EXTREMITY VENOUS DUPLEX . Evaluation of lower  extremity veins with ultrasound (B-mode imaging, pulsed Doppler, color   Doppler). Includes the common femoral, deep femoral, femoral,  popliteal, posterior tibial, peroneal, and great saphenous veins. Other veins, for example the gastrocnemius and soleal veins, may also  be visualized.     FINDINGS: Technically difficult study due to patient body habitus. Gray scale imaging supoptimal. Sofiya Jean scale and color flow duplex images   of the veins in the left lower extremity demonstrate normal  compressibility, spontaneous and augmented flow profiles, and absence  of filling defects throughout the deep and superficial veins in the  left lower extremity.     CONCLUSION: Left lower extremity venous duplex negative for deep  venous thrombosis or thrombophlebitis. Right common femoral vein is  thrombus free. NOTE: Avascular structure was noted in the left  popliteal fossa measuring 0.71 x 3.68 cm consistent with a baker's  cyst.     ADDITIONAL COMMENTS     I have personally reviewed the data relevant to the interpretation of  this study.     TECHNOLOGIST: Ewa Rice  Signed: 2017 07:27 PM                      Display only: Transcription (Jessicafurt)       PACS Images      Show images for DUPLEX LOWER EXT VENOUS LEFT

## 2017-08-09 NOTE — DISCHARGE INSTRUCTIONS
Baker's Cyst: Care Instructions  Your Care Instructions    A Baker's cyst is a swelling behind the knee. It may cause pain or stiffness when you bend your knee or straighten it all the way. Baker's cysts are also called popliteal cysts. If you have arthritis or another condition that is the cause of the Baker's cyst, your doctor may treat that condition. A Baker's cyst may go away on its own. If not, or if it is causing a lot of discomfort, your doctor may drain the fluid that has built up behind the knee. In some cases, a Baker's cyst is removed in surgery. There are things you can do at home, such as staying off your leg, to reduce the swelling and pain. Follow-up care is a key part of your treatment and safety. Be sure to make and go to all appointments, and call your doctor if you are having problems. It's also a good idea to know your test results and keep a list of the medicines you take. How can you care for yourself at home? · Rest your knee as much as possible. · Ask your doctor if you can take an over-the-counter pain medicine, such as acetaminophen (Tylenol), ibuprofen (Advil, Motrin), or naproxen (Aleve). Be safe with medicines. Read and follow all instructions on the label. · Use a cane, a crutch, a walker, or another device if you need help to get around. These can help rest your knees. · If you have an elastic bandage, make sure it is snug but not so tight that your leg is numb, tingles, or swells below the bandage. Ask your doctor if you can loosen the bandage if it is too tight. · Follow your doctor's instructions about how much weight you can put on your knee. · Stay at a healthy weight. Being overweight puts extra strain on your knee. When should you call for help? Call 911 anytime you think you may need emergency care. For example, call if:  · You have sudden chest pain and shortness of breath, and you cough up blood.   Call your doctor now or seek immediate medical care if:  · You have signs of a blood clot, such as:  ¨ Pain in your calf, thigh, or groin. ¨ Redness and swelling in your leg or groin. · You have sudden swelling, warmth, or pain in any joint. · You have joint pain and a fever or rash. · You have such bad pain that you cannot use the joint. Watch closely for changes in your health, and be sure to contact your doctor if:  · You have mild joint symptoms that continue even with more than 6 weeks of care at home. · You have stomach pain or other problems with your medicine. Where can you learn more? Go to http://zeyad-cecile.info/. Enter X092 in the search box to learn more about \"Baker's Cyst: Care Instructions. \"  Current as of: March 21, 2017  Content Version: 11.3  © 4858-8313 Touchbase. Care instructions adapted under license by [employer] (which disclaims liability or warranty for this information). If you have questions about a medical condition or this instruction, always ask your healthcare professional. [place] [address] any warranty or liability for your use of this information. We hope that we have addressed all of your medical concerns. The examination and treatment you received in the Emergency Department were for an emergent problem and were not intended as complete care. It is important that you follow up with your healthcare provider(s) for ongoing care. If your symptoms worsen or do not improve as expected, and you are unable to reach your usual health care provider(s), you should return to the Emergency Department. Today's healthcare is undergoing tremendous change, and patient satisfaction surveys are one of the many tools to assess the quality of medical care. You may receive a survey from the [employer] regarding your experience in the Emergency Department. I hope that your experience has been completely positive, particularly the medical care that I provided. As such, please participate in the survey; anything less than excellent does not meet my expectations or intentions. [facility] and [facility] participate in nationally recognized quality of care measures. If your blood pressure is greater than 120/80, as reported below, we urge that you seek medical care to address the potential of high blood pressure, commonly known as hypertension. Hypertension can be hereditary or can be caused by certain medical conditions, pain, stress, or \"white coat syndrome. \"       Please make an appointment with your health care provider(s) for follow up of your Emergency Department visit. VITALS:   Patient Vitals for the past 8 hrs:   Temp Pulse Resp BP SpO2   08/09/17 1805 98 °F (36.7 °C) 79 18 114/67 99 %          Thank you for allowing us to provide you with medical care today. We realize that you have many choices for your emergency care needs. Please choose us in the future for any continued health care needs. Yogesh Boyd [facility], [address] [place], [place] [address]y [address].   Office: [phone]

## 2017-08-28 ENCOUNTER — OFFICE VISIT (OUTPATIENT)
Dept: FAMILY MEDICINE CLINIC | Age: 52
End: 2017-08-28

## 2017-08-28 VITALS
TEMPERATURE: 98.2 F | DIASTOLIC BLOOD PRESSURE: 66 MMHG | WEIGHT: 237 LBS | HEIGHT: 70 IN | OXYGEN SATURATION: 96 % | RESPIRATION RATE: 17 BRPM | BODY MASS INDEX: 33.93 KG/M2 | SYSTOLIC BLOOD PRESSURE: 100 MMHG | HEART RATE: 84 BPM

## 2017-08-28 DIAGNOSIS — E53.8 VITAMIN B12 DEFICIENCY: ICD-10-CM

## 2017-08-28 DIAGNOSIS — Z98.84 STATUS POST GASTRIC BYPASS FOR OBESITY: ICD-10-CM

## 2017-08-28 DIAGNOSIS — E55.9 VITAMIN D DEFICIENCY: ICD-10-CM

## 2017-08-28 DIAGNOSIS — M71.20 BAKER'S CYST, UNSPECIFIED LATERALITY: Primary | ICD-10-CM

## 2017-08-28 RX ORDER — CYANOCOBALAMIN 1000 UG/ML
1000 INJECTION, SOLUTION INTRAMUSCULAR; SUBCUTANEOUS
Qty: 10 ML | Refills: 0
Start: 2017-08-28 | End: 2019-10-31 | Stop reason: SDUPTHER

## 2017-08-28 RX ORDER — DICLOFENAC SODIUM 10 MG/G
2 GEL TOPICAL 4 TIMES DAILY
Qty: 1 EACH | Refills: 5 | Status: SHIPPED | OUTPATIENT
Start: 2017-08-28 | End: 2019-11-13

## 2017-08-28 RX ORDER — CYANOCOBALAMIN 1000 UG/ML
1000 INJECTION, SOLUTION INTRAMUSCULAR; SUBCUTANEOUS ONCE
Qty: 1 ML | Refills: 0
Start: 2017-08-28 | End: 2017-08-28

## 2017-08-28 NOTE — MR AVS SNAPSHOT
Visit Information Date & Time Provider Department Dept. Phone Encounter #  
 8/28/2017  2:00 PM Jacob Arriaga MD [address] [place] [address] [id number] Follow-up Instructions Return in about 3 months (around 11/28/2017). Your Appointments 9/12/2017  9:00 AM  
MAMMOGRAPHY with MAMMOGRAM, KARTHIK Chang Brooks ([facility]) Appt Note: AE/MAMMO TH pt  
 [address] [address]  
[address]  
  
    
 9/12/2017  9:20 AM  
ESTABLISHED PATIENT with MD Chang Kline ([facility]) Appt Note: AE/MAMMO TH pt  
 [address] [address]  
[address] [place] [address] Upcoming Health Maintenance Date Due DTaP/Tdap/Td series (1 - Tdap) 8/22/1987 FOBT Q 1 YEAR AGE 50-75 8/22/2016 BREAST CANCER SCRN MAMMOGRAM 4/22/2017 INFLUENZA AGE 9 TO ADULT 8/1/2017 PAP AKA CERVICAL CYTOLOGY 4/22/2021 Allergies as of 8/28/2017  Review Complete On: 8/28/2017 By: Yvon Roberts LPN No Known Allergies Current Immunizations  Reviewed on 7/31/2016 No immunizations on file. Not reviewed this visit You Were Diagnosed With   
  
 Codes Comments Baker's cyst, unspecified laterality    -  Primary ICD-10-CM: M71.20 ICD-9-CM: 727.51 Vitamin D deficiency     ICD-10-CM: E55.9 ICD-9-CM: 268.9 Vitamin B12 deficiency     ICD-10-CM: E53.8 ICD-9-CM: 266.2 Status post gastric bypass for obesity     ICD-10-CM: Z98.84 ICD-9-CM: V45.86 Vitals BP Pulse Temp Resp Height(growth percentile) Weight(growth percentile) 100/66 84 98.2 °F (36.8 °C) (Oral) 17 5' 10\" (1.778 m) 237 lb (107.5 kg) SpO2 BMI OB Status Smoking Status 96% 34.01 kg/m2 Unknown Never Smoker Vitals History BMI and BSA Data Body Mass Index Body Surface Area 34.01 kg/m 2 2.3 m 2 Preferred Pharmacy Pharmacy Name Phone Mark Kilpatrick [address], [address]  [phone] Your Updated Medication List  
  
   
This list is accurate as of: 8/28/17  3:00 PM.  Always use your most recent med list.  
  
  
  
  
 * cyanocobalamin 1,000 mcg/mL injection Commonly known as:  VITAMIN B-12  
1 mL by IntraMUSCular route once for 1 dose. * cyanocobalamin 1,000 mcg/mL injection Commonly known as:  VITAMIN B-12  
1 mL by IntraMUSCular route every thirty (30) days. cyclobenzaprine 5 mg tablet Commonly known as:  FLEXERIL Take 1 Tab by mouth nightly as needed for Muscle Spasm(s). diclofenac 1 % Gel Commonly known as:  VOLTAREN Apply 2 g to affected area four (4) times daily. ergocalciferol 50,000 unit capsule Commonly known as:  ERGOCALCIFEROL Take 1 Cap by mouth every seven (7) days. ferrous sulfate 325 mg (65 mg iron) EC tablet Commonly known as:  IRON Take 1 Tab by mouth three (3) times daily (with meals). norethindrone-e estradiol-iron 1 mg-20 mcg (24)/75 mg (4) Tab Commonly known as:  LOMEDIA 24 FE Take 1 Tab by mouth daily. Syringe with Needle (Disp) 3 mL 22 gauge x 1\" Syrg Commonly known as:  EASY TOUCH Use to inject 1 ml vit b12 IM E53.8  
  
 topiramate 25 mg tablet Commonly known as:  TOPAMAX Take 1 Tab by mouth daily (with dinner). triamterene-hydroCHLOROthiazide 75-50 mg per tablet Commonly known as:  Анна Macario Take 1 Tab by mouth daily. * Notice: This list has 2 medication(s) that are the same as other medications prescribed for you. Read the directions carefully, and ask your doctor or other care provider to review them with you. Prescriptions Sent to Pharmacy Refills  
 diclofenac (VOLTAREN) 1 % gel 5 Sig: Apply 2 g to affected area four (4) times daily. Class: Normal  
 Pharmacy: [address], [address] [facility] Ph #: [phone] Route: Topical  
 Syringe with Needle, Disp, (EASY TOUCH) 3 mL 22 gauge x 1\" syrg 0 Sig: Use to inject 1 ml vit b12 IM E53.8 Class: Normal  
 Pharmacy: [address], [address] [facility] Ph #: [phone] We Performed the Following VITAMIN B12 [id number] CPT(R)] VITAMIN D, 1, 25 DIHYDROXY [43157 CPT(R)] Follow-up Instructions Return in about 3 months (around 11/28/2017). Patient Instructions Baker's Cyst: Care Instructions Your Care Instructions A Baker's cyst is a swelling behind the knee. It may cause pain or stiffness when you bend your knee or straighten it all the way. Baker's cysts are also called popliteal cysts. If you have arthritis or another condition that is the cause of the Baker's cyst, your doctor may treat that condition. A Baker's cyst may go away on its own. If not, or if it is causing a lot of discomfort, your doctor may drain the fluid that has built up behind the knee. In some cases, a Baker's cyst is removed in surgery. There are things you can do at home, such as staying off your leg, to reduce the swelling and pain. Follow-up care is a key part of your treatment and safety. Be sure to make and go to all appointments, and call your doctor if you are having problems. It's also a good idea to know your test results and keep a list of the medicines you take. How can you care for yourself at home? · Rest your knee as much as possible. · Ask your doctor if you can take an over-the-counter pain medicine, such as acetaminophen (Tylenol), ibuprofen (Advil, Motrin), or naproxen (Aleve). Be safe with medicines. Read and follow all instructions on the label. · Use a cane, a crutch, a walker, or another device if you need help to get around. These can help rest your knees. · If you have an elastic bandage, make sure it is snug but not so tight that your leg is numb, tingles, or swells below the bandage. Ask your doctor if you can loosen the bandage if it is too tight. · Follow your doctor's instructions about how much weight you can put on your knee. · Stay at a healthy weight. Being overweight puts extra strain on your knee. When should you call for help? Call 911 anytime you think you may need emergency care. For example, call if: 
· You have sudden chest pain and shortness of breath, and you cough up blood. Call your doctor now or seek immediate medical care if: 
· You have signs of a blood clot, such as: 
¨ Pain in your calf, thigh, or groin. ¨ Redness and swelling in your leg or groin. · You have sudden swelling, warmth, or pain in any joint. · You have joint pain and a fever or rash. · You have such bad pain that you cannot use the joint. Watch closely for changes in your health, and be sure to contact your doctor if: 
· You have mild joint symptoms that continue even with more than 6 weeks of care at home. · You have stomach pain or other problems with your medicine. Where can you learn more? Go to http://zeyad-cecile.info/. Enter R703 in the search box to learn more about \"Baker's Cyst: Care Instructions. \" Current as of: March 21, 2017 Content Version: 11.3 © 6192-2588 [employer]. Care instructions adapted under license by [employer] (which disclaims liability or warranty for this information). If you have questions about a medical condition or this instruction, always ask your healthcare professional. [place] [address] any warranty or liability for your use of this information. Learning About Vitamin D Why is it important to get enough vitamin D? Your body needs vitamin D to absorb calcium. Calcium keeps your bones and muscles, including your heart, healthy and strong.  If your muscles don't get enough calcium, they can cramp, hurt, or feel weak. You may have long-term (chronic) muscle aches and pains. If you don't get enough vitamin D throughout life, you have an increased chance of having thin and brittle bones (osteoporosis) in your later years. Children who don't get enough vitamin D may not grow as much as others their age. They also have a chance of getting a rare disease called rickets. It causes weak bones. Vitamin D and calcium are added to many foods. And your body uses sunshine to make its own vitamin D. How much vitamin D do you need? The [place] of Medicine recommends that people ages 3 through 79 get 600 IU (international units) every day. Adults 71 and older need 800 IU every day. Blood tests for vitamin D can check your vitamin D level. But there is no standard normal range used by all laboratories. The [place] of Medicine recommends a blood level of 20 ng/mL of vitamin D for healthy bones. And most people in the [place] and [facility]) meet this goal. 
How can you get more vitamin D? Foods that contain vitamin D include: 
· Rinard, tuna, and mackerel. These are some of the best foods to eat when you need to get more vitamin D. 
· Cheese, egg yolks, and beef liver. These foods have vitamin D in small amounts. · Milk, soy drinks, orange juice, yogurt, margarine, and some kinds of cereal have vitamin D added to them. Some people don't make vitamin D as well as others. They may have to take extra care in getting enough vitamin D. Things that reduce how much vitamin D your body makes include: · Dark skin, such as many  Americans have. · Age, especially if you are older than 72. · Digestive problems, such as Crohn's or celiac disease. · Liver and kidney disease. Some people who do not get enough vitamin D may need supplements. Are there any risks from taking vitamin D? 
· Too much vitamin D: 
¨ Can damage your kidneys. ¨ Can cause nausea and vomiting, constipation, and weakness. ¨ Raises the amount of calcium in your blood. If this happens, you can get confused or have an irregular heart rhythm. · Vitamin D may interact with other medicines. Tell your doctor about all of the medicines you take, including over-the-counter drugs, herbs, and pills. Tell your doctor about all of your current medical problems. Where can you learn more? Go to http://zeyad-cecile.info/. Enter [id number] in the search box to learn more about \"Learning About Vitamin D.\" 
Current as of: July 26, 2016 Content Version: 11.3 © 9167-8379 Telly. Care instructions adapted under license by [employer] (which disclaims liability or warranty for this information). If you have questions about a medical condition or this instruction, always ask your healthcare professional. [address] any warranty or liability for your use of this information. Introducing [facility]! Dear Lina Whelan: Thank you for requesting a [employer] account. Our records indicate that you already have an active [employer] Manager account. You can access your account anytime at [email or web address] Did you know that you can access your hospital and ER discharge instructions at any time in Soccer Manager? You can also review all of your test results from your hospital stay or ER visit. Additional Information If you have questions, please visit the Frequently Asked Questions section of the [employer] Manager website at [email or web address] Remember, [employer] Manager is NOT to be used for urgent needs. For medical emergencies, dial 911. Now available from your iPhone and Android! Please provide this summary of care documentation to your next provider. Your primary care clinician is listed as Wm Mattson. If you have any questions after today's visit, please call [phone].

## 2017-08-28 NOTE — PROGRESS NOTES
1. Have you been to the ER, urgent care clinic since your last visit? Hospitalized since your last visit? Rohit Farrell 8/9/17 for leg pain dx: cyst in LLE. 2. Have you seen or consulted any other health care providers outside of the [address] [place] [address] since your last visit? Include any pap smears or colon screening.  No

## 2017-08-28 NOTE — PATIENT INSTRUCTIONS
Baker's Cyst: Care Instructions  Your Care Instructions    A Baker's cyst is a swelling behind the knee. It may cause pain or stiffness when you bend your knee or straighten it all the way. Baker's cysts are also called popliteal cysts. If you have arthritis or another condition that is the cause of the Baker's cyst, your doctor may treat that condition. A Baker's cyst may go away on its own. If not, or if it is causing a lot of discomfort, your doctor may drain the fluid that has built up behind the knee. In some cases, a Baker's cyst is removed in surgery. There are things you can do at home, such as staying off your leg, to reduce the swelling and pain. Follow-up care is a key part of your treatment and safety. Be sure to make and go to all appointments, and call your doctor if you are having problems. It's also a good idea to know your test results and keep a list of the medicines you take. How can you care for yourself at home? · Rest your knee as much as possible. · Ask your doctor if you can take an over-the-counter pain medicine, such as acetaminophen (Tylenol), ibuprofen (Advil, Motrin), or naproxen (Aleve). Be safe with medicines. Read and follow all instructions on the label. · Use a cane, a crutch, a walker, or another device if you need help to get around. These can help rest your knees. · If you have an elastic bandage, make sure it is snug but not so tight that your leg is numb, tingles, or swells below the bandage. Ask your doctor if you can loosen the bandage if it is too tight. · Follow your doctor's instructions about how much weight you can put on your knee. · Stay at a healthy weight. Being overweight puts extra strain on your knee. When should you call for help? Call 911 anytime you think you may need emergency care. For example, call if:  · You have sudden chest pain and shortness of breath, and you cough up blood.   Call your doctor now or seek immediate medical care if:  · You have signs of a blood clot, such as:  ¨ Pain in your calf, thigh, or groin. ¨ Redness and swelling in your leg or groin. · You have sudden swelling, warmth, or pain in any joint. · You have joint pain and a fever or rash. · You have such bad pain that you cannot use the joint. Watch closely for changes in your health, and be sure to contact your doctor if:  · You have mild joint symptoms that continue even with more than 6 weeks of care at home. · You have stomach pain or other problems with your medicine. Where can you learn more? Go to http://zeyad-cecile.info/. Enter B387 in the search box to learn more about \"Baker's Cyst: Care Instructions. \"  Current as of: March 21, 2017  Content Version: 11.3  © 8779-3282 Branch[employer]. Care instructions adapted under license by [employer] (which disclaims liability or warranty for this information). If you have questions about a medical condition or this instruction, always ask your healthcare professional. [place] [address] any warranty or liability for your use of this information. Learning About Vitamin D  Why is it important to get enough vitamin D? Your body needs vitamin D to absorb calcium. Calcium keeps your bones and muscles, including your heart, healthy and strong. If your muscles don't get enough calcium, they can cramp, hurt, or feel weak. You may have long-term (chronic) muscle aches and pains. If you don't get enough vitamin D throughout life, you have an increased chance of having thin and brittle bones (osteoporosis) in your later years. Children who don't get enough vitamin D may not grow as much as others their age. They also have a chance of getting a rare disease called rickets. It causes weak bones. Vitamin D and calcium are added to many foods. And your body uses sunshine to make its own vitamin D. How much vitamin D do you need?   The [place] of Medicine recommends that people ages 3 through 79 get 600 IU (international units) every day. Adults 71 and older need 800 IU every day. Blood tests for vitamin D can check your vitamin D level. But there is no standard normal range used by all laboratories. The [place] of Medicine recommends a blood level of 20 ng/mL of vitamin D for healthy bones. And most people in the [place] and [facility]) meet this goal.  How can you get more vitamin D? Foods that contain vitamin D include:  · [place], tuna, and mackerel. These are some of the best foods to eat when you need to get more vitamin D.  · Cheese, egg yolks, and beef liver. These foods have vitamin D in small amounts. · Milk, soy drinks, orange juice, yogurt, margarine, and some kinds of cereal have vitamin D added to them. Some people don't make vitamin D as well as others. They may have to take extra care in getting enough vitamin D. Things that reduce how much vitamin D your body makes include:  · Dark skin, such as many  Americans have. · Age, especially if you are older than 72. · Digestive problems, such as Crohn's or celiac disease. · Liver and kidney disease. Some people who do not get enough vitamin D may need supplements. Are there any risks from taking vitamin D?  · Too much vitamin D:  ¨ Can damage your kidneys. ¨ Can cause nausea and vomiting, constipation, and weakness. ¨ Raises the amount of calcium in your blood. If this happens, you can get confused or have an irregular heart rhythm. · Vitamin D may interact with other medicines. Tell your doctor about all of the medicines you take, including over-the-counter drugs, herbs, and pills. Tell your doctor about all of your current medical problems. Where can you learn more? Go to http://zeyad-cecile.info/. Enter [id number] in the search box to learn more about \"Learning About Vitamin D.\"  Current as of: July 26, 2016  Content Version: 11.3  © 1714-2637 [email or web address].  Care instructions adapted under license by [employer] (which disclaims liability or warranty for this information). If you have questions about a medical condition or this instruction, always ask your healthcare professional. [address] any warranty or liability for your use of this information.

## 2017-08-28 NOTE — PROGRESS NOTES
Chief Complaint   Patient presents with    Cyst     LLE    Vitamin B12 Deficiency     labs for Vitamin B12 and Vitamin D level     she is a 47y.o. year old female who presents for evalution. New prob  Went to ER for knee pain and had doppler done on left leg. There was a bakers cyst in the left knee. No DVT  She is c/o no improvement and actually feeling more pain since she started exercising more. She is post GBP and has VIt B12 and Vit D deficiency  She wants to start giving herself the B12 injection. She was shown on her last visit here how to self administer. She says she definitely feels competent to do it herself  Reviewed PmHx, RxHx, FmHx, SocHx, AllgHx and updated and dated in the chart. Patient Active Problem List    Diagnosis    Uterine leiomyoma    Essential hypertension    Status post gastric bypass for obesity    Cheloid scar       Nurse notes were reviewed and copied and are correct  Review of Systems - negative except as listed above in the HPI    Objective:     Vitals:    08/28/17 1421   BP: 100/66   Pulse: 84   Resp: 17   Temp: 98.2 °F (36.8 °C)   TempSrc: Oral   SpO2: 96%   Weight: 237 lb (107.5 kg)   Height: 5' 10\" (1.778 m)       Physical Examination: General appearance - alert, well appearing, and in no distress  Mental status - alert, oriented to person, place, and time  Musculoskeletal - left pop fossa larger candida right. No heat, no redness. Calf equal bilaterally, nontender  Extremities - peripheral pulses normal, no pedal edema, no clubbing or cyanosis  Skin - normal coloration and turgor, no rashes, no suspicious skin lesions noted      Assessment/ Plan:   Diagnoses and all orders for this visit:    1. Baker's cyst, unspecified laterality  -     diclofenac (VOLTAREN) 1 % gel; Apply 2 g to affected area four (4) times daily. 2. Vitamin D deficiency  -     VITAMIN D, 1, 25 DIHYDROXY  Will notify her of what dose is needed on the vit D going forward.   3. Vitamin B12 deficiency  -     VITAMIN B12  -     cyanocobalamin (VITAMIN B-12) 1,000 mcg/mL injection; 1 mL by IntraMUSCular route once for 1 dose. -     cyanocobalamin (VITAMIN B-12) 1,000 mcg/mL injection; 1 mL by IntraMUSCular route every thirty (30) days. -     Syringe with Needle, Disp, (EASY TOUCH) 3 mL 22 gauge x 1\" syrg; Use to inject 1 ml vit b12 IM E53.8  I counseled her for more tn 50% of this more than 30 min visit on the importance of giving the injection in the muscle and not in a joint for example. She showed me that she knows to give it in the muscle of her arm. 4. Status post gastric bypass for obesity  Stable. needs more work to avoid weight gain     Follow-up Disposition:  Return in about 3 months (around 11/28/2017). ICD-10-CM ICD-9-CM    1. Baker's cyst, unspecified laterality M71.20 727.51 diclofenac (VOLTAREN) 1 % gel   2. Vitamin D deficiency E55.9 268.9 VITAMIN D, 1, 25 DIHYDROXY   3. Vitamin B12 deficiency E53.8 266.2 VITAMIN B12      cyanocobalamin (VITAMIN B-12) 1,000 mcg/mL injection      cyanocobalamin (VITAMIN B-12) 1,000 mcg/mL injection      Syringe with Needle, Disp, (EASY TOUCH) 3 mL 22 gauge x 1\" syrg   4. Status post gastric bypass for obesity Z98.84 V45.86        I have discussed the diagnosis with the patient and the intended plan as seen in the above orders. The patient has received an after-visit summary and questions were answered concerning future plans. Medication Side Effects and Warnings were discussed with patient: yes  Patient Labs were reviewed and or requested: yes  Patient Past Records were reviewed and or requested: yes        Patient Instructions        Baker's Cyst: Care Instructions  Your Care Instructions    A Baker's cyst is a swelling behind the knee. It may cause pain or stiffness when you bend your knee or straighten it all the way. Baker's cysts are also called popliteal cysts.   If you have arthritis or another condition that is the cause of the [facility] cyst, your doctor may treat that condition. A Baker's cyst may go away on its own. If not, or if it is causing a lot of discomfort, your doctor may drain the fluid that has built up behind the knee. In some cases, a Baker's cyst is removed in surgery. There are things you can do at home, such as staying off your leg, to reduce the swelling and pain. Follow-up care is a key part of your treatment and safety. Be sure to make and go to all appointments, and call your doctor if you are having problems. It's also a good idea to know your test results and keep a list of the medicines you take. How can you care for yourself at home? · Rest your knee as much as possible. · Ask your doctor if you can take an over-the-counter pain medicine, such as acetaminophen (Tylenol), ibuprofen (Advil, Motrin), or naproxen (Aleve). Be safe with medicines. Read and follow all instructions on the label. · Use a cane, a crutch, a walker, or another device if you need help to get around. These can help rest your knees. · If you have an elastic bandage, make sure it is snug but not so tight that your leg is numb, tingles, or swells below the bandage. Ask your doctor if you can loosen the bandage if it is too tight. · Follow your doctor's instructions about how much weight you can put on your knee. · Stay at a healthy weight. Being overweight puts extra strain on your knee. When should you call for help? Call 911 anytime you think you may need emergency care. For example, call if:  · You have sudden chest pain and shortness of breath, and you cough up blood. Call your doctor now or seek immediate medical care if:  · You have signs of a blood clot, such as:  ¨ Pain in your calf, thigh, or groin. ¨ Redness and swelling in your leg or groin. · You have sudden swelling, warmth, or pain in any joint. · You have joint pain and a fever or rash. · You have such bad pain that you cannot use the joint.   Watch closely for changes in your health, and be sure to contact your doctor if:  · You have mild joint symptoms that continue even with more than 6 weeks of care at home. · You have stomach pain or other problems with your medicine. Where can you learn more? Go to http://zeyad-cecile.info/. Enter R160 in the search box to learn more about \"Baker's Cyst: Care Instructions. \"  Current as of: March 21, 2017  Content Version: 11.3  © 3258-7558 [employer]. Care instructions adapted under license by [employer] (which disclaims liability or warranty for this information). If you have questions about a medical condition or this instruction, always ask your healthcare professional. [address] any warranty or liability for your use of this information. Learning About Vitamin D  Why is it important to get enough vitamin D? Your body needs vitamin D to absorb calcium. Calcium keeps your bones and muscles, including your heart, healthy and strong. If your muscles don't get enough calcium, they can cramp, hurt, or feel weak. You may have long-term (chronic) muscle aches and pains. If you don't get enough vitamin D throughout life, you have an increased chance of having thin and brittle bones (osteoporosis) in your later years. Children who don't get enough vitamin D may not grow as much as others their age. They also have a chance of getting a rare disease called rickets. It causes weak bones. Vitamin D and calcium are added to many foods. And your body uses sunshine to make its own vitamin D. How much vitamin D do you need? The [place] of [employer] recommends that people ages 3 through 79 get 600 IU (international units) every day. Adults 71 and older need 800 IU every day. Blood tests for vitamin D can check your vitamin D level. But there is no standard normal range used by all laboratories.  The [place] of [employer] recommends a blood level of 20 ng/mL of vitamin D for healthy bones. And most people in the [place] and [facility]) meet this goal.  How can you get more vitamin D? Foods that contain vitamin D include:  · [place], tuna, and mackerel. These are some of the best foods to eat when you need to get more vitamin D.  · Cheese, egg yolks, and beef liver. These foods have vitamin D in small amounts. · Milk, soy drinks, orange juice, yogurt, margarine, and some kinds of cereal have vitamin D added to them. Some people don't make vitamin D as well as others. They may have to take extra care in getting enough vitamin D. Things that reduce how much vitamin D your body makes include:  · Dark skin, such as many  Americans have. · Age, especially if you are older than 72. · Digestive problems, such as Crohn's or celiac disease. · Liver and kidney disease. Some people who do not get enough vitamin D may need supplements. Are there any risks from taking vitamin D?  · Too much vitamin D:  ¨ Can damage your kidneys. ¨ Can cause nausea and vomiting, constipation, and weakness. ¨ Raises the amount of calcium in your blood. If this happens, you can get confused or have an irregular heart rhythm. · Vitamin D may interact with other medicines. Tell your doctor about all of the medicines you take, including over-the-counter drugs, herbs, and pills. Tell your doctor about all of your current medical problems. Where can you learn more? Go to http://zeyad-cecile.info/. Enter [id number] in the search box to learn more about \"Learning About Vitamin D.\"  Current as of: July 26, 2016  Content Version: 11.3  © 7208-2644 [employer]. Care instructions adapted under license by [employer] (which disclaims liability or warranty for this information).  If you have questions about a medical condition or this instruction, always ask your healthcare professional. [address] any warranty or liability for your use of this information.         The patient verbalizes understanding and agrees with the plan of care        Patient has the advanced directives booklet to review

## 2017-08-29 LAB
1,25(OH)2D3 SERPL-MCNC: 134 PG/ML (ref 19.9–79.3)
VIT B12 SERPL-MCNC: >2000 PG/ML (ref 211–946)

## 2017-08-31 NOTE — PROGRESS NOTES
Your vitamin B12 and the vit D are higher than is normal. Do not take the supplements. Having too much vitamin D for example can lead to kidney stones.  I want to recheck these levels in 3 months

## 2017-09-07 NOTE — PROGRESS NOTES
Spoke with pt and advised of lab results/recommendations and to follow-up in 3 months. Pt verbalized understanding and no further questions.

## 2017-09-26 ENCOUNTER — OFFICE VISIT (OUTPATIENT)
Dept: OBGYN CLINIC | Age: 52
End: 2017-09-26

## 2017-09-26 VITALS
BODY MASS INDEX: 34.24 KG/M2 | HEIGHT: 70 IN | DIASTOLIC BLOOD PRESSURE: 80 MMHG | WEIGHT: 239.2 LBS | SYSTOLIC BLOOD PRESSURE: 132 MMHG

## 2017-09-26 DIAGNOSIS — Z01.419 ENCOUNTER FOR GYNECOLOGICAL EXAMINATION (GENERAL) (ROUTINE) WITHOUT ABNORMAL FINDINGS: Primary | ICD-10-CM

## 2017-09-26 NOTE — PROGRESS NOTES
Amber Kiser is a ,  47 y.o. female [address] whose LMP was on 2016 who presents for her annual checkup. She is having no significant problems. Menstrual status:    Her periods are absent in flow. She has nor had a cycle since 2016    She denies dysmenorrhea. She reports no premenstrual symptoms. The patient is not using HRT. Contraception:    The current method of family planning is condoms always. Sexual history:    She  reports that she does not currently engage in sexual activity but has had male partners.; She reports using the following method of birth control/protection: Condom. Medical conditions:    Since her last annual GYN exam about 2016 ago, she has had the following changes in her health history: none. Pap and Mammogram History:    Her most recent Pap smear was normal/-HPV obtained 2016 year(s) ago. The patient had her mammogram today in our office. Breast Cancer History/Substance Abuse:    She has a family history of breast cancer. Osteoporosis History:    Family history does not include a first or second degree relative with osteopenia or osteoporosis. A bone density scan was not obtained. She is currently not taking calcium and vit D. Past Medical History:   Diagnosis Date    Anemia     Hypertension     Menorrhagia      Past Surgical History:   Procedure Laterality Date    HX GASTRIC BYPASS       Current Outpatient Prescriptions   Medication Sig Dispense Refill    diclofenac (VOLTAREN) 1 % gel Apply 2 g to affected area four (4) times daily. 1 Each 5    cyanocobalamin (VITAMIN B-12) 1,000 mcg/mL injection 1 mL by IntraMUSCular route every thirty (30) days. 10 mL 0    ergocalciferol (ERGOCALCIFEROL) 50,000 unit capsule Take 1 Cap by mouth every seven (7) days. 12 Cap 0    ferrous sulfate (IRON) 325 mg (65 mg iron) EC tablet Take 1 Tab by mouth three (3) times daily (with meals).  90 Tab 3    triamterene-hydroCHLOROthiazide (MAXZIDE) 75-50 mg per tablet Take 1 Tab by mouth daily. 90 Tab 3    Syringe with Needle, Disp, (EASY TOUCH) 3 mL 22 gauge x 1\" syrg Use to inject 1 ml vit b12 IM E53.8 12 Syringe 0    cyclobenzaprine (FLEXERIL) 5 mg tablet Take 1 Tab by mouth nightly as needed for Muscle Spasm(s). 20 Tab 0    norethindrone-e estradiol-iron (LOMEDIA 24 FE) 1 mg-20 mcg (24)/75 mg (4) tab Take 1 Tab by mouth daily. 3 Package 1    topiramate (TOPAMAX) 25 mg tablet Take 1 Tab by mouth daily (with dinner). 30 Tab 6     Allergies: Review of patient's allergies indicates no known allergies. Social History     Social History    Marital status:      Spouse name: N/A    Number of children: N/A    Years of education: N/A     Occupational History    Not on file. Social History Main Topics    Smoking status: Never Smoker    Smokeless tobacco: Never Used    Alcohol use No    Drug use: No    Sexual activity: Not Currently     Partners: Male     Birth control/ protection: Condom     Other Topics Concern    Not on file     Social History Narrative     Tobacco History:  reports that she has never smoked. She has never used smokeless tobacco.  Alcohol Abuse:  reports that she does not drink alcohol. Drug Abuse:  reports that she does not use illicit drugs.   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    Uterine leiomyoma D25.9         Review of Systems - History obtained from the patient  Constitutional: negative for weight loss, fever, night sweats  HEENT: negative for hearing loss, earache, congestion, snoring, sorethroat  CV: negative for chest pain, palpitations, edema  Resp: negative for cough, shortness of breath, wheezing  GI: negative for change in bowel habits, abdominal pain, black or bloody stools  : negative for frequency, dysuria, hematuria, vaginal discharge  MSK: negative for back pain, joint pain, muscle pain  Breast: negative for breast lumps, nipple discharge, galactorrhea  Skin :negative for itching, rash, hives  Neuro: negative for dizziness, headache, confusion, weakness  Psych: negative for anxiety, depression, change in mood  Heme/lymph: negative for bleeding, bruising, pallor    Physical Exam    Visit Vitals    /80    Ht 5' 10\" (1.778 m)    Wt 239 lb 3.2 oz (108.5 kg)    LMP 09/01/2016    BMI 34.32 kg/m2     Constitutional  · Appearance: well-nourished, well developed, alert, in no acute distress    HENT  · Head and Face: appears normal    Neck  · Inspection/Palpation: normal appearance, no masses or tenderness  · Lymph Nodes: no lymphadenopathy present  · Thyroid: gland size normal, nontender, no nodules or masses present on palpation    Chest  · Respiratory Effort: breathing normal  · Auscultation: normal breath sounds    Cardiovascular  · Heart:  · Auscultation: regular rate and rhythm without murmur    Breasts  · Inspection of Breasts: breasts symmetrical, no skin changes, no discharge present, nipple appearance normal, no skin retraction present  · Palpation of Breasts and Axillae: no masses present on palpation, no breast tenderness  · Axillary Lymph Nodes: no lymphadenopathy present    Gastrointestinal  · Abdominal Examination: abdomen non-tender to palpation, normal bowel sounds, no masses present  · Liver and spleen: no hepatomegaly present, spleen not palpable  · Hernias: no hernias identified    Skin  · General Inspection: no rash, no lesions identified    Neurologic/Psychiatric  · Mental Status:  · Orientation: grossly oriented to person, place and time  · Mood and Affect: mood normal, affect appropriate    Genitourinary  · External Genitalia: normal appearance for age, no discharge present, no tenderness present, no inflammatory lesions present, no masses present, no atrophy present  · Vagina: normal vaginal vault without central or paravaginal defects, no discharge present, no inflammatory lesions present, no masses present  · Bladder: non-tender to palpation  · Urethra: appears normal  · Cervix: normal, at introitus   · Uterus: normal size, shape and consistency  · Adnexa: no adnexal tenderness present, no adnexal masses present  · Perineum: perineum within normal limits, no evidence of trauma, no rashes or skin lesions present  · Anus: anus within normal limits, no hemorrhoids present  · Inguinal Lymph Nodes: no lymphadenopathy present    Assessment:  Routine gynecologic examination  Her current medical status is satisfactory with no evidence of significant gynecologic issues.   asx pelvic relaxation  Plan:  Counseled re: diet, exercise, healthy lifestyle  Return for yearly wellness visits  Rec annual mammogram

## 2017-09-26 NOTE — PATIENT INSTRUCTIONS
Breast Self-Exam: Care Instructions  Your Care Instructions  A breast self-exam is when you check your breasts for lumps or changes. This regular exam helps you learn how your breasts normally look and feel. Most breast problems or changes are not because of cancer. Breast self-exam is not a substitute for a mammogram. Having regular breast exams by your doctor and regular mammograms improve your chances of finding any problems with your breasts. Some women set a time each month to do a step-by-step breast self-exam. Other women like a less formal system. They might look at their breasts as they brush their teeth, or feel their breasts once in a while in the shower. If you notice a change in your breast, tell your doctor. Follow-up care is a key part of your treatment and safety. Be sure to make and go to all appointments, and call your doctor if you are having problems. Its also a good idea to know your test results and keep a list of the medicines you take. How do you do a breast self-exam?  · The best time to examine your breasts is usually one week after your menstrual period begins. Your breasts should not be tender then. If you do not have periods, you might do your exam on a day of the month that is easy to remember. · To examine your breasts:  ¨ Remove all your clothes above the waist and lie down. When you are lying down, your breast tissue spreads evenly over your chest wall, which makes it easier to feel all your breast tissue. ¨ Use the pads--not the fingertips--of the 3 middle fingers of your left hand to check your right breast. Move your fingers slowly in small coin-sized circles that overlap. ¨ Use three levels of pressure to feel of all your breast tissue. Use light pressure to feel the tissue close to the skin surface. Use medium pressure to feel a little deeper. Use firm pressure to feel your tissue close to your breastbone and ribs.  Use each pressure level to feel your breast tissue before moving on to the next spot. ¨ Check your entire breast, moving up and down as if following a strip from the collarbone to the bra line, and from the armpit to the ribs. Repeat until you have covered the entire breast.  ¨ Repeat this procedure for your left breast, using the pads of the 3 middle fingers of your right hand. · To examine your breasts while in the shower:  ¨ Place one arm over your head and lightly soap your breast on that side. ¨ Using the pads of your fingers, gently move your hand over your breast (in the strip pattern described above), feeling carefully for any lumps or changes. ¨ Repeat for the other breast.  · Have your doctor inspect anything you notice to see if you need further testing. Where can you learn more? Go to http://zeyad-cecile.info/. Enter P148 in the search box to learn more about \"Breast Self-Exam: Care Instructions. \"  Current as of: July 26, 2016  Content Version: 11.3  © 6425-2672 [employer]. Care instructions adapted under license by [employer] (which disclaims liability or warranty for this information). If you have questions about a medical condition or this instruction, always ask your healthcare professional. [place] [address] any warranty or liability for your use of this information.

## 2017-10-03 DIAGNOSIS — M71.22 SYNOVIAL CYST OF LEFT POPLITEAL SPACE: Primary | ICD-10-CM

## 2017-12-14 ENCOUNTER — OFFICE VISIT (OUTPATIENT)
Dept: FAMILY MEDICINE CLINIC | Age: 52
End: 2017-12-14

## 2017-12-14 VITALS
HEART RATE: 83 BPM | RESPIRATION RATE: 18 BRPM | OXYGEN SATURATION: 97 % | WEIGHT: 253 LBS | TEMPERATURE: 98 F | HEIGHT: 70 IN | DIASTOLIC BLOOD PRESSURE: 76 MMHG | SYSTOLIC BLOOD PRESSURE: 115 MMHG | BODY MASS INDEX: 36.22 KG/M2

## 2017-12-14 DIAGNOSIS — E55.9 VITAMIN D DEFICIENCY: ICD-10-CM

## 2017-12-14 DIAGNOSIS — L65.9 HAIR THINNING: ICD-10-CM

## 2017-12-14 DIAGNOSIS — Z98.84 STATUS POST GASTRIC BYPASS FOR OBESITY: Primary | ICD-10-CM

## 2017-12-14 DIAGNOSIS — I10 ESSENTIAL HYPERTENSION: ICD-10-CM

## 2017-12-14 DIAGNOSIS — E87.0 HYPERNATREMIA: ICD-10-CM

## 2017-12-14 DIAGNOSIS — E53.8 VITAMIN B12 DEFICIENCY: ICD-10-CM

## 2017-12-14 NOTE — MR AVS SNAPSHOT
Visit Information Date & Time Provider Department Dept. Phone Encounter #  
 12/14/2017 10:30 AM Azul Ngo MD [id number] [facility] [phone] [id number] Upcoming Health Maintenance Date Due DTaP/Tdap/Td series (1 - Tdap) 8/22/1987 FOBT Q 1 YEAR AGE 50-75 8/22/2016 Influenza Age 5 to Adult 8/1/2017 BREAST CANCER SCRN MAMMOGRAM 9/26/2018 PAP AKA CERVICAL CYTOLOGY 4/22/2021 Allergies as of 12/14/2017  Review Complete On: 12/14/2017 By: Azul Ngo MD  
 No Known Allergies Current Immunizations  Reviewed on 7/31/2016 No immunizations on file. Not reviewed this visit You Were Diagnosed With   
  
 Codes Comments Status post gastric bypass for obesity    -  Primary ICD-10-CM: H77.72 ICD-9-CM: V45.86 Vitamin D deficiency     ICD-10-CM: E55.9 ICD-9-CM: 268.9 Vitamin B12 deficiency     ICD-10-CM: E53.8 ICD-9-CM: 266.2 Hair thinning     ICD-10-CM: L65.9 ICD-9-CM: 704.00 Essential hypertension     ICD-10-CM: I10 
ICD-9-CM: 401.9 Vitals BP Pulse Temp Resp Height(growth percentile) Weight(growth percentile) 115/76 83 98 °F (36.7 °C) (Oral) 18 5' 10\" (1.778 m) 253 lb (114.8 kg) SpO2 BMI OB Status Smoking Status 97% 36.3 kg/m2 Menopause Never Smoker Vitals History BMI and BSA Data Body Mass Index Body Surface Area  
 36.3 kg/m 2 2.38 m 2 Preferred Pharmacy Pharmacy Name Phone  [facility] [address], [address] [phone] Your Updated Medication List  
  
   
This list is accurate as of: 12/14/17 11:05 AM.  Always use your most recent med list.  
  
  
  
  
 cyanocobalamin 1,000 mcg/mL injection Commonly known as:  VITAMIN B-12  
1 mL by IntraMUSCular route every thirty (30) days. diclofenac 1 % Gel Commonly known as:  VOLTAREN Apply 2 g to affected area four (4) times daily. ergocalciferol 50,000 unit capsule Commonly known as:  ERGOCALCIFEROL Take 1 Cap by mouth every seven (7) days. ferrous sulfate 325 mg (65 mg iron) EC tablet Commonly known as:  IRON Take 1 Tab by mouth three (3) times daily (with meals). triamterene-hydroCHLOROthiazide 75-50 mg per tablet Commonly known as:  Buster Nicolle Take 1 Tab by mouth daily. We Performed the Following CBC WITH AUTOMATED DIFF [65702 CPT(R)] MAGNESIUM [id number] CPT(R)] METABOLIC PANEL, COMPREHENSIVE [70107 CPT(R)] TSH 3RD GENERATION [31277 CPT(R)] VITAMIN B12 [id number] CPT(R)] VITAMIN D, 1, 25 DIHYDROXY [96544 CPT(R)] Introducing [facility] SERVICES! Dear Cedrick Lema: Thank you for requesting a [employer] account. Our records indicate that you already have an active [employer] account. You can access your account anytime at [email or web address] Did you know that you can access your hospital and ER discharge instructions at any time in [employer]? You can also review all of your test results from your hospital stay or ER visit. Additional Information If you have questions, please visit the Frequently Asked Questions section of the [employer] website at [email or web address] Remember, [employer] is NOT to be used for urgent needs. For medical emergencies, dial 911. Now available from your iPhone and Android! Please provide this summary of care documentation to your next provider. Your primary care clinician is listed as Shaan Blanchard. If you have any questions after today's visit, please call [phone].

## 2017-12-14 NOTE — PROGRESS NOTES
1. Have you been to the ER, urgent care clinic since your last visit? Hospitalized since your last visit? No    2. Have you seen or consulted any other health care providers outside of the [address] [place] [address] since your last visit? Include any pap smears or colon screening. [facility] Fragoso foot specialist.     Chief Complaint   Patient presents with    Pre-op Exam     l. foot.  Surgery scheduled for January 5, 2017

## 2017-12-14 NOTE — PROGRESS NOTES
Chief Complaint   Patient presents with    Pre-op Exam     l. foot. Surgery scheduled for January 5, 2018     she is a 47y.o. year old female who presents for evalution. She is having bunion surgery on the left foot jan 5th   She is here to get preop cleararnce  She does exercise, 2-3 times a week. She does the ellyptical. She denies chest pain with exertion    She is concerned about her HGb  She is post GBP  Reviewed PmHx, RxHx, FmHx, SocHx, AllgHx and updated and dated in the chart. Aspirin yes ____   No____ N/A____    Patient Active Problem List    Diagnosis    Uterine leiomyoma    Essential hypertension    Status post gastric bypass for obesity    Cheloid scar       Nurse notes were reviewed and copied and are correct  Review of Systems - negative except as listed above in the HPI    Objective:     Vitals:    12/14/17 1034   BP: 115/76   Pulse: 83   Resp: 18   Temp: 98 °F (36.7 °C)   TempSrc: Oral   SpO2: 97%   Weight: 253 lb (114.8 kg)   Height: 5' 10\" (1.778 m)     Physical Examination: General appearance - alert, well appearing, and in no distress  Mental status - alert, oriented to person, place, and time  Neck - supple, no significant adenopathy  Chest - clear to auscultation, no wheezes, rales or rhonchi, symmetric air entry  Heart - normal rate, regular rhythm, normal S1, S2, no murmurs, rubs, clicks or gallops  Skin - HAIR: broken hairs, an area of hair loss on the right frontal area where she has a piece glued on         Assessment/ Plan:   Diagnoses and all orders for this visit:    1. Status post gastric bypass for obesity  -     CBC WITH AUTOMATED DIFF  -     MAGNESIUM  -     METABOLIC PANEL, COMPREHENSIVE  lvels wer above normal a few months ago  2. Vitamin D deficiency  -     VITAMIN D, 1, 25 DIHYDROXY    3. Vitamin B12 deficiency  -     VITAMIN B12    4. Hair thinning  -     TSH 3RD GENERATION  Most is broken and follicles appear fine. I think her hair products are the cause.  I am checking her for vitamin deficiencies as well as her thyroid  5. Essential hypertension  -     METABOLIC PANEL, COMPREHENSIVE       Follow-up Disposition: Not on File    ICD-10-CM ICD-9-CM    1. Status post gastric bypass for obesity Z98.84 V45.86 CBC WITH AUTOMATED DIFF      MAGNESIUM      METABOLIC PANEL, COMPREHENSIVE   2. Vitamin D deficiency E55.9 268.9 VITAMIN D, 1, 25 DIHYDROXY   3. Vitamin B12 deficiency E53.8 266.2 VITAMIN B12   4. Hair thinning L65.9 704.00 TSH 3RD GENERATION   5. Essential hypertension O89 193.2 METABOLIC PANEL, COMPREHENSIVE       I have discussed the diagnosis with the patient and the intended plan as seen in the above orders. The patient has received an after-visit summary and questions were answered concerning future plans. Medication Side Effects and Warnings were discussed with patient: yes  Patient Labs were reviewed and or requested: yes  Patient Past Records were reviewed and or requested: yes        There are no Patient Instructions on file for this visit.     The patient verbalizes understanding and agrees with the plan of care        Patient has the advanced directives booklet to review

## 2017-12-18 LAB
1,25(OH)2D3 SERPL-MCNC: NORMAL PG/ML
ALBUMIN SERPL-MCNC: 4.9 G/DL (ref 3.5–5.5)
ALBUMIN/GLOB SERPL: 1.4 {RATIO} (ref 1.2–2.2)
ALP SERPL-CCNC: 143 IU/L (ref 39–117)
ALT SERPL-CCNC: 30 IU/L (ref 0–32)
AST SERPL-CCNC: 19 IU/L (ref 0–40)
BASOPHILS # BLD AUTO: 0.1 X10E3/UL (ref 0–0.2)
BASOPHILS NFR BLD AUTO: 1 %
BILIRUB SERPL-MCNC: 0.4 MG/DL (ref 0–1.2)
BUN SERPL-MCNC: 12 MG/DL (ref 6–24)
BUN/CREAT SERPL: 19 (ref 9–23)
CALCIUM SERPL-MCNC: 10.2 MG/DL (ref 8.7–10.2)
CHLORIDE SERPL-SCNC: 98 MMOL/L (ref 96–106)
CO2 SERPL-SCNC: 24 MMOL/L (ref 18–29)
CREAT SERPL-MCNC: 0.62 MG/DL (ref 0.57–1)
EOSINOPHIL # BLD AUTO: 0.1 X10E3/UL (ref 0–0.4)
EOSINOPHIL NFR BLD AUTO: 2 %
ERYTHROCYTE [DISTWIDTH] IN BLOOD BY AUTOMATED COUNT: 14.5 % (ref 12.3–15.4)
GFR SERPLBLD CREATININE-BSD FMLA CKD-EPI: 105 ML/MIN/1.73
GFR SERPLBLD CREATININE-BSD FMLA CKD-EPI: 121 ML/MIN/1.73
GLOBULIN SER CALC-MCNC: 3.5 G/DL (ref 1.5–4.5)
GLUCOSE SERPL-MCNC: 81 MG/DL (ref 65–99)
HCT VFR BLD AUTO: 44.4 % (ref 34–46.6)
HGB BLD-MCNC: 14.9 G/DL (ref 11.1–15.9)
IMM GRANULOCYTES # BLD: 0 X10E3/UL (ref 0–0.1)
IMM GRANULOCYTES NFR BLD: 0 %
LYMPHOCYTES # BLD AUTO: 1.5 X10E3/UL (ref 0.7–3.1)
LYMPHOCYTES NFR BLD AUTO: 24 %
MAGNESIUM SERPL-MCNC: 2.3 MG/DL (ref 1.6–2.3)
MCH RBC QN AUTO: 28.2 PG (ref 26.6–33)
MCHC RBC AUTO-ENTMCNC: 33.6 G/DL (ref 31.5–35.7)
MCV RBC AUTO: 84 FL (ref 79–97)
MONOCYTES # BLD AUTO: 0.3 X10E3/UL (ref 0.1–0.9)
MONOCYTES NFR BLD AUTO: 5 %
NEUTROPHILS # BLD AUTO: 4.4 X10E3/UL (ref 1.4–7)
NEUTROPHILS NFR BLD AUTO: 68 %
PLATELET # BLD AUTO: 305 X10E3/UL (ref 150–379)
POTASSIUM SERPL-SCNC: 3.6 MMOL/L (ref 3.5–5.2)
PROT SERPL-MCNC: 8.4 G/DL (ref 6–8.5)
RBC # BLD AUTO: 5.28 X10E6/UL (ref 3.77–5.28)
SODIUM SERPL-SCNC: 145 MMOL/L (ref 134–144)
TSH SERPL DL<=0.005 MIU/L-ACNC: 1.05 UIU/ML (ref 0.45–4.5)
VIT B12 SERPL-MCNC: 245 PG/ML (ref 232–1245)
WBC # BLD AUTO: 6.3 X10E3/UL (ref 3.4–10.8)

## 2017-12-21 NOTE — PROGRESS NOTES
The sample was not sufficient blood to do the vit D test. We will do it with your next blood tests  The hemoglobin was normal- you are not anemic  The vitamin B12 is normal but much less than it was 3 months ago. You will need an injection in 3 months  The thyroid level is normal  The sodium level was too high and one of the liver tests was also.  I want to repeat those and do the vit D level as soon as you can come back in

## 2017-12-27 NOTE — PROGRESS NOTES
Spoke with pt in regards to lab results. Pt stated she had viewed results on My chart. She was concerned about waiting to have Vitamin D lab done with next blood test. Advised pt that she can come in office for lab visit. Provided with lab hours. She was also concerned about waiting an additional 3 months before B-12 injection would be administered due to levels. . Please advise if pt should have done before 3 months?

## 2017-12-28 DIAGNOSIS — E53.8 VITAMIN B 12 DEFICIENCY: Primary | ICD-10-CM

## 2017-12-28 RX ORDER — CYANOCOBALAMIN 1000 UG/ML
1000 INJECTION, SOLUTION INTRAMUSCULAR; SUBCUTANEOUS ONCE
Qty: 1 VIAL | Refills: 0
Start: 2017-12-28 | End: 2017-12-28

## 2017-12-28 NOTE — PROGRESS NOTES
The vit B12 will probably be fine another 2-3 months but it is absolutely fine to get an injection when she comes in for the recheck blood tests(sodium, liver and vit D ).  She can do these things as soon as she can get back in

## 2017-12-29 LAB
1,25(OH)2D3 SERPL-MCNC: 81.2 PG/ML (ref 19.9–79.3)
ALBUMIN SERPL-MCNC: 4.3 G/DL (ref 3.5–5.5)
ALBUMIN/GLOB SERPL: 1.4 {RATIO} (ref 1.2–2.2)
ALP SERPL-CCNC: 126 IU/L (ref 39–117)
ALT SERPL-CCNC: 20 IU/L (ref 0–32)
AST SERPL-CCNC: 15 IU/L (ref 0–40)
BILIRUB SERPL-MCNC: 0.5 MG/DL (ref 0–1.2)
BUN SERPL-MCNC: 9 MG/DL (ref 6–24)
BUN/CREAT SERPL: 14 (ref 9–23)
CALCIUM SERPL-MCNC: 9.2 MG/DL (ref 8.7–10.2)
CHLORIDE SERPL-SCNC: 96 MMOL/L (ref 96–106)
CO2 SERPL-SCNC: 30 MMOL/L (ref 18–29)
CREAT SERPL-MCNC: 0.63 MG/DL (ref 0.57–1)
GLOBULIN SER CALC-MCNC: 3 G/DL (ref 1.5–4.5)
GLUCOSE SERPL-MCNC: 87 MG/DL (ref 65–99)
POTASSIUM SERPL-SCNC: 4.1 MMOL/L (ref 3.5–5.2)
PROT SERPL-MCNC: 7.3 G/DL (ref 6–8.5)
SODIUM SERPL-SCNC: 139 MMOL/L (ref 134–144)

## 2018-01-04 NOTE — PROGRESS NOTES
The liver and kidney tests are normal  The vit D level is higher than needed. You do not need  the vit D supplements right now. Too much vit D can lead to development of stones in the kidney.  Come in 3 months to recheck the level

## 2018-01-05 DIAGNOSIS — I10 ESSENTIAL HYPERTENSION: ICD-10-CM

## 2018-01-05 RX ORDER — TRIAMTERENE AND HYDROCHLOROTHIAZIDE 75; 50 MG/1; MG/1
1 TABLET ORAL DAILY
Qty: 90 TAB | Refills: 3 | Status: SHIPPED | OUTPATIENT
Start: 2018-01-05 | End: 2018-09-22 | Stop reason: SDUPTHER

## 2018-03-31 DIAGNOSIS — D50.0 IRON DEFICIENCY ANEMIA DUE TO CHRONIC BLOOD LOSS: ICD-10-CM

## 2018-04-08 RX ORDER — LANOLIN ALCOHOL/MO/W.PET/CERES
CREAM (GRAM) TOPICAL
Qty: 90 TAB | Refills: 2 | Status: SHIPPED | OUTPATIENT
Start: 2018-04-08 | End: 2018-06-13 | Stop reason: SDUPTHER

## 2018-06-13 ENCOUNTER — OFFICE VISIT (OUTPATIENT)
Dept: FAMILY MEDICINE CLINIC | Age: 53
End: 2018-06-13

## 2018-06-13 VITALS
SYSTOLIC BLOOD PRESSURE: 106 MMHG | HEIGHT: 70 IN | RESPIRATION RATE: 18 BRPM | DIASTOLIC BLOOD PRESSURE: 77 MMHG | HEART RATE: 78 BPM | WEIGHT: 236.5 LBS | TEMPERATURE: 98 F | OXYGEN SATURATION: 97 % | BODY MASS INDEX: 33.86 KG/M2

## 2018-06-13 DIAGNOSIS — D50.8 OTHER IRON DEFICIENCY ANEMIA: ICD-10-CM

## 2018-06-13 DIAGNOSIS — Z98.84 STATUS POST GASTRIC BYPASS FOR OBESITY: ICD-10-CM

## 2018-06-13 DIAGNOSIS — R53.83 FATIGUE, UNSPECIFIED TYPE: ICD-10-CM

## 2018-06-13 DIAGNOSIS — N92.6 MISSED MENSES: ICD-10-CM

## 2018-06-13 DIAGNOSIS — I10 ESSENTIAL HYPERTENSION: Primary | ICD-10-CM

## 2018-06-13 DIAGNOSIS — Z13.220 SCREENING FOR HYPERCHOLESTEROLEMIA: ICD-10-CM

## 2018-06-13 DIAGNOSIS — E55.9 VITAMIN D DEFICIENCY: ICD-10-CM

## 2018-06-13 DIAGNOSIS — K90.89: ICD-10-CM

## 2018-06-13 LAB
HCG URINE, QL. (POC): NEGATIVE
VALID INTERNAL CONTROL?: YES

## 2018-06-13 RX ORDER — LANOLIN ALCOHOL/MO/W.PET/CERES
CREAM (GRAM) TOPICAL
Qty: 90 TAB | Refills: 2 | Status: SHIPPED | OUTPATIENT
Start: 2018-06-13 | End: 2019-11-13

## 2018-06-13 NOTE — PATIENT INSTRUCTIONS
Fatigue: Care Instructions  Your Care Instructions    Fatigue is a feeling of tiredness, exhaustion, or lack of energy. You may feel fatigue because of too much or not enough activity. It can also come from stress, lack of sleep, boredom, and poor diet. Many medical problems, such as viral infections, can cause fatigue. Emotional problems, especially depression, are often the cause of fatigue. Fatigue is most often a symptom of another problem. Treatment for fatigue depends on the cause. For example, if you have fatigue because you have a certain health problem, treating this problem also treats your fatigue. If depression or anxiety is the cause, treatment may help. Follow-up care is a key part of your treatment and safety. Be sure to make and go to all appointments, and call your doctor if you are having problems. It's also a good idea to know your test results and keep a list of the medicines you take. How can you care for yourself at home? · Get regular exercise. But don't overdo it. Go back and forth between rest and exercise. · Get plenty of rest.  · Eat a healthy diet. Do not skip meals, especially breakfast.  · Reduce your use of caffeine, tobacco, and alcohol. Caffeine is most often found in coffee, tea, cola drinks, and chocolate. · Limit medicines that can cause fatigue. This includes tranquilizers and cold and allergy medicines. When should you call for help? Watch closely for changes in your health, and be sure to contact your doctor if:  ? · You have new symptoms such as fever or a rash. ? · Your fatigue gets worse. ? · You have been feeling down, depressed, or hopeless. Or you may have lost interest in things that you usually enjoy. ? · You are not getting better as expected. Where can you learn more? Go to http://zeyad-cecile.info/. Enter D001 in the search box to learn more about \"Fatigue: Care Instructions. \"  Current as of: March 20, 2017  Content Version: 11.4  © 5651-4998 Healthwise, [employer]. Care instructions adapted under license by [employer] (which disclaims liability or warranty for this information). If you have questions about a medical condition or this instruction, always ask your healthcare professional. [address] any warranty or liability for your use of this information. DASH Diet: Care Instructions  Your Care Instructions    The DASH diet is an eating plan that can help lower your blood pressure. DASH stands for Dietary Approaches to Stop Hypertension. Hypertension is high blood pressure. The DASH diet focuses on eating foods that are high in calcium, potassium, and magnesium. These nutrients can lower blood pressure. The foods that are highest in these nutrients are fruits, vegetables, low-fat dairy products, nuts, seeds, and legumes. But taking calcium, potassium, and magnesium supplements instead of eating foods that are high in those nutrients does not have the same effect. The DASH diet also includes whole grains, fish, and poultry. The DASH diet is one of several lifestyle changes your doctor may recommend to lower your high blood pressure. Your doctor may also want you to decrease the amount of sodium in your diet. Lowering sodium while following the DASH diet can lower blood pressure even further than just the DASH diet alone. Follow-up care is a key part of your treatment and safety. Be sure to make and go to all appointments, and call your doctor if you are having problems. It's also a good idea to know your test results and keep a list of the medicines you take. How can you care for yourself at home? Following the DASH diet  · Eat 4 to 5 servings of fruit each day. A serving is 1 medium-sized piece of fruit, ½ cup chopped or canned fruit, 1/4 cup dried fruit, or 4 ounces (½ cup) of fruit juice. Choose fruit more often than fruit juice. · Eat 4 to 5 servings of vegetables each day.  A serving is 1 cup of lettuce or raw leafy vegetables, ½ cup of chopped or cooked vegetables, or 4 ounces (½ cup) of vegetable juice. Choose vegetables more often than vegetable juice. · Get 2 to 3 servings of low-fat and fat-free dairy each day. A serving is 8 ounces of milk, 1 cup of yogurt, or 1 ½ ounces of cheese. · Eat 6 to 8 servings of grains each day. A serving is 1 slice of bread, 1 ounce of dry cereal, or ½ cup of cooked rice, pasta, or cooked cereal. Try to choose whole-grain products as much as possible. · Limit lean meat, poultry, and fish to 2 servings each day. A serving is 3 ounces, about the size of a deck of cards. · Eat 4 to 5 servings of nuts, seeds, and legumes (cooked dried beans, lentils, and split peas) each week. A serving is 1/3 cup of nuts, 2 tablespoons of seeds, or ½ cup of cooked beans or peas. · Limit fats and oils to 2 to 3 servings each day. A serving is 1 teaspoon of vegetable oil or 2 tablespoons of salad dressing. · Limit sweets and added sugars to 5 servings or less a week. A serving is 1 tablespoon jelly or jam, ½ cup sorbet, or 1 cup of lemonade. · Eat less than 2,300 milligrams (mg) of sodium a day. If you limit your sodium to 1,500 mg a day, you can lower your blood pressure even more. Tips for success  · Start small. Do not try to make dramatic changes to your diet all at once. You might feel that you are missing out on your favorite foods and then be more likely to not follow the plan. Make small changes, and stick with them. Once those changes become habit, add a few more changes. · Try some of the following:  ¨ Make it a goal to eat a fruit or vegetable at every meal and at snacks. This will make it easy to get the recommended amount of fruits and vegetables each day. ¨ Try yogurt topped with fruit and nuts for a snack or healthy dessert. ¨ Add lettuce, tomato, cucumber, and onion to sandwiches.   ¨ Combine a ready-made pizza crust with low-fat mozzarella cheese and lots of vegetable toppings. Try using tomatoes, squash, spinach, broccoli, carrots, cauliflower, and onions. ¨ Have a variety of cut-up vegetables with a low-fat dip as an appetizer instead of chips and dip. ¨ Sprinkle sunflower seeds or chopped almonds over salads. Or try adding chopped walnuts or almonds to cooked vegetables. ¨ Try some vegetarian meals using beans and peas. Add garbanzo or kidney beans to salads. Make burritos and tacos with mashed tran beans or black beans. Where can you learn more? Go to http://zeyad-cecile.info/. Enter Z320 in the search box to learn more about \"DASH Diet: Care Instructions. \"  Current as of: September 21, 2016  Content Version: 11.4  © 7607-2427 [employer]. Care instructions adapted under license by [employer] (which disclaims liability or warranty for this information). If you have questions about a medical condition or this instruction, always ask your healthcare professional. [address] any warranty or liability for your use of this information.

## 2018-06-13 NOTE — MR AVS SNAPSHOT
[address] [place] [id number] 
[phone] Patient: Danielle Norton MRN: [id number] :1965 Visit Information Date & Time Provider Department Dept. Phone Encounter #  
 2018  1:45 PM Ela Zarate NP [facility] [phone] [id number] Follow-up Instructions Return in about 6 weeks (around 2018) for f/u fatigue. Upcoming Health Maintenance Date Due DTaP/Tdap/Td series (1 - Tdap) 1987 FOBT Q 1 YEAR AGE 50-75 2016 BREAST CANCER SCRN MAMMOGRAM 2018 Influenza Age 5 to Adult 2018 PAP AKA CERVICAL CYTOLOGY 2021 Allergies as of 2018  Review Complete On: 2018 By: Ela Zarate NP No Known Allergies Current Immunizations  Reviewed on 2016 No immunizations on file. Not reviewed this visit You Were Diagnosed With   
  
 Codes Comments Other iron deficiency anemia    -  Primary ICD-10-CM: D50.8 ICD-9-CM: 280.8 Essential hypertension     ICD-10-CM: I10 
ICD-9-CM: 401.9 Status post gastric bypass for obesity     ICD-10-CM: Z98.84 ICD-9-CM: V45.86 Missed menses     ICD-10-CM: N92.6 ICD-9-CM: 626.4 Fatigue, unspecified type     ICD-10-CM: R53.83 ICD-9-CM: 780.79 Screening for hypercholesterolemia     ICD-10-CM: Z13.220 ICD-9-CM: V77.91 Vitamin D deficiency     ICD-10-CM: E55.9 ICD-9-CM: 268.9 Impaired folate absorption     ICD-10-CM: K90.9 ICD-9-CM: 579.9 Vitals BP Pulse Temp Resp Height(growth percentile) Weight(growth percentile) 106/77 78 98 °F (36.7 °C) (Oral) 18 5' 10\" (1.778 m) 236 lb 8 oz (107.3 kg) SpO2 BMI OB Status Smoking Status 97% 33.93 kg/m2 Menopause Never Smoker BMI and BSA Data Body Mass Index Body Surface Area  
 33.93 kg/m 2 2.3 m 2 Preferred Pharmacy Pharmacy Name Phone [address], [address] [phone] Your Updated Medication List  
  
   
This list is accurate as of 6/13/18  2:25 PM.  Always use your most recent med list.  
  
  
  
  
 cyanocobalamin 1,000 mcg/mL injection Commonly known as:  VITAMIN B-12  
1 mL by IntraMUSCular route every thirty (30) days. diclofenac 1 % Gel Commonly known as:  VOLTAREN Apply 2 g to affected area four (4) times daily. ergocalciferol 50,000 unit capsule Commonly known as:  ERGOCALCIFEROL Take 1 Cap by mouth every seven (7) days. ferrous sulfate 325 mg (65 mg iron) tablet TAKE ONE TABLET BY MOUTH THREE TIMES A DAY WITH MEALS  
  
 triamterene-hydroCHLOROthiazide 75-50 mg per tablet Commonly known as:  Erin Pluck Take 1 Tab by mouth daily. Prescriptions Sent to Pharmacy Refills  
 ferrous sulfate 325 mg (65 mg iron) tablet 2 Sig: TAKE ONE TABLET BY MOUTH THREE TIMES A DAY WITH MEALS Class: Normal  
 Pharmacy: [address], [address] [facility] Ph #: [phone] We Performed the Following AMB POC URINE PREGNANCY TEST, VISUAL COLOR COMPARISON [88863 CPT(R)] CBC WITH AUTOMATED DIFF [82015 CPT(R)] LIPID PANEL [31118 CPT(R)] METABOLIC PANEL, COMPREHENSIVE [03167 CPT(R)] REFERRAL TO BARIATRIC SURGERY [EQQ572 Custom] Comments: S/p obey en y in 2005 in [facility], routine f/u. TSH 3RD GENERATION [12758 CPT(R)] VITAMIN B12 [id number] CPT(R)] VITAMIN D, 25 HYDROXY [id number] CPT(R)] Follow-up Instructions Return in about 6 weeks (around 7/25/2018) for f/u fatigue. Referral Information Referral ID Referred By Referred To  
  
 [id number] Ladonna SLADE NP   
   [address] [place] [address] Vivian Amanda6 Vik Ward Phone: [phone] Fax: [phone] Visits Status Start Date End Date 1 New Request 6/13/18 6/13/19 If your referral has a status of pending review or denied, additional information will be sent to support the outcome of this decision. Patient Instructions Fatigue: Care Instructions Your Care Instructions Fatigue is a feeling of tiredness, exhaustion, or lack of energy. You may feel fatigue because of too much or not enough activity. It can also come from stress, lack of sleep, boredom, and poor diet. Many medical problems, such as viral infections, can cause fatigue. Emotional problems, especially depression, are often the cause of fatigue. Fatigue is most often a symptom of another problem. Treatment for fatigue depends on the cause. For example, if you have fatigue because you have a certain health problem, treating this problem also treats your fatigue. If depression or anxiety is the cause, treatment may help. Follow-up care is a key part of your treatment and safety. Be sure to make and go to all appointments, and call your doctor if you are having problems. It's also a good idea to know your test results and keep a list of the medicines you take. How can you care for yourself at home? · Get regular exercise. But don't overdo it. Go back and forth between rest and exercise. · Get plenty of rest. 
· Eat a healthy diet. Do not skip meals, especially breakfast. 
· Reduce your use of caffeine, tobacco, and alcohol. Caffeine is most often found in coffee, tea, cola drinks, and chocolate. · Limit medicines that can cause fatigue. This includes tranquilizers and cold and allergy medicines. When should you call for help? Watch closely for changes in your health, and be sure to contact your doctor if: 
? · You have new symptoms such as fever or a rash. ? · Your fatigue gets worse. ? · You have been feeling down, depressed, or hopeless. Or you may have lost interest in things that you usually enjoy. ? · You are not getting better as expected. Where can you learn more? Go to http://zeyad-cecile.info/. Enter K084 in the search box to learn more about \"Fatigue: Care Instructions. \" Current as of: March 20, 2017 Content Version: 11.4 © 8706-9720 [employer]. Care instructions adapted under license by [employer] (which disclaims liability or warranty for this information). If you have questions about a medical condition or this instruction, always ask your healthcare professional. [address] any warranty or liability for your use of this information. DASH Diet: Care Instructions Your Care Instructions The DASH diet is an eating plan that can help lower your blood pressure. DASH stands for Dietary Approaches to Stop Hypertension. Hypertension is high blood pressure. The DASH diet focuses on eating foods that are high in calcium, potassium, and magnesium. These nutrients can lower blood pressure. The foods that are highest in these nutrients are fruits, vegetables, low-fat dairy products, nuts, seeds, and legumes. But taking calcium, potassium, and magnesium supplements instead of eating foods that are high in those nutrients does not have the same effect. The DASH diet also includes whole grains, fish, and poultry. The DASH diet is one of several lifestyle changes your doctor may recommend to lower your high blood pressure. Your doctor may also want you to decrease the amount of sodium in your diet. Lowering sodium while following the DASH diet can lower blood pressure even further than just the DASH diet alone. Follow-up care is a key part of your treatment and safety. Be sure to make and go to all appointments, and call your doctor if you are having problems. It's also a good idea to know your test results and keep a list of the medicines you take. How can you care for yourself at home? Following the DASH diet · Eat 4 to 5 servings of fruit each day.  A serving is 1 medium-sized piece of fruit, ½ cup chopped or canned fruit, 1/4 cup dried fruit, or 4 ounces (½ cup) of fruit juice. Choose fruit more often than fruit juice. · Eat 4 to 5 servings of vegetables each day. A serving is 1 cup of lettuce or raw leafy vegetables, ½ cup of chopped or cooked vegetables, or 4 ounces (½ cup) of vegetable juice. Choose vegetables more often than vegetable juice. · Get 2 to 3 servings of low-fat and fat-free dairy each day. A serving is 8 ounces of milk, 1 cup of yogurt, or 1 ½ ounces of cheese. · Eat 6 to 8 servings of grains each day. A serving is 1 slice of bread, 1 ounce of dry cereal, or ½ cup of cooked rice, pasta, or cooked cereal. Try to choose whole-grain products as much as possible. · Limit lean meat, poultry, and fish to 2 servings each day. A serving is 3 ounces, about the size of a deck of cards. · Eat 4 to 5 servings of nuts, seeds, and legumes (cooked dried beans, lentils, and split peas) each week. A serving is 1/3 cup of nuts, 2 tablespoons of seeds, or ½ cup of cooked beans or peas. · Limit fats and oils to 2 to 3 servings each day. A serving is 1 teaspoon of vegetable oil or 2 tablespoons of salad dressing. · Limit sweets and added sugars to 5 servings or less a week. A serving is 1 tablespoon jelly or jam, ½ cup sorbet, or 1 cup of lemonade. · Eat less than 2,300 milligrams (mg) of sodium a day. If you limit your sodium to 1,500 mg a day, you can lower your blood pressure even more. Tips for success · Start small. Do not try to make dramatic changes to your diet all at once. You might feel that you are missing out on your favorite foods and then be more likely to not follow the plan. Make small changes, and stick with them. Once those changes become habit, add a few more changes. · Try some of the following: ¨ Make it a goal to eat a fruit or vegetable at every meal and at snacks. This will make it easy to get the recommended amount of fruits and vegetables each day. ¨ Try yogurt topped with fruit and nuts for a snack or healthy dessert. ¨ Add lettuce, tomato, cucumber, and onion to sandwiches. ¨ Combine a ready-made pizza crust with low-fat mozzarella cheese and lots of vegetable toppings. Try using tomatoes, squash, spinach, broccoli, carrots, cauliflower, and onions. ¨ Have a variety of cut-up vegetables with a low-fat dip as an appetizer instead of chips and dip. ¨ Sprinkle sunflower seeds or chopped almonds over salads. Or try adding chopped walnuts or almonds to cooked vegetables. ¨ Try some vegetarian meals using beans and peas. Add garbanzo or kidney beans to salads. Make burritos and tacos with mashed tran beans or black beans. Where can you learn more? Go to http://zeyadCryoTherapeuticscecile.info/. Enter D688 in the search box to learn more about \"DASH Diet: Care Instructions. \" Current as of: September 21, 2016 Content Version: 11.4 © 2700-3543 [employer]. Care instructions adapted under license by [employer] (which disclaims liability or warranty for this information). If you have questions about a medical condition or this instruction, always ask your healthcare professional. [address] any warranty or liability for your use of this information. Introducing [facility]! Dear Norma Wilkerson: Thank you for requesting a [employer] account. Our records indicate that you already have an active [employer] Management [employer] account. You can access your account anytime at [email or web address]/[employer] Did you know that you can access your hospital and ER discharge instructions at any time in [employer] Management Solution? You can also review all of your test results from your hospital stay or ER visit. Additional Information If you have questions, please visit the Frequently Asked Questions section of the [employer] Management Solution website at [email or web address] [email or web address]/. Remember, MyChart is NOT to be used for urgent needs. For medical emergencies, dial 911. Now available from your iPhone and Android! Please provide this summary of care documentation to your next provider. Your primary care clinician is listed as Jordin Anand. If you have any questions after today's visit, please call [phone].

## 2018-06-14 LAB
25(OH)D3+25(OH)D2 SERPL-MCNC: 9.3 NG/ML (ref 30–100)
ALBUMIN SERPL-MCNC: 4.6 G/DL (ref 3.5–5.5)
ALBUMIN/GLOB SERPL: 1.4 {RATIO} (ref 1.2–2.2)
ALP SERPL-CCNC: 155 IU/L (ref 39–117)
ALT SERPL-CCNC: 22 IU/L (ref 0–32)
AST SERPL-CCNC: 13 IU/L (ref 0–40)
BASOPHILS # BLD AUTO: 0 X10E3/UL (ref 0–0.2)
BASOPHILS NFR BLD AUTO: 0 %
BILIRUB SERPL-MCNC: 0.4 MG/DL (ref 0–1.2)
BUN SERPL-MCNC: 17 MG/DL (ref 6–24)
BUN/CREAT SERPL: 23 (ref 9–23)
CALCIUM SERPL-MCNC: 9.8 MG/DL (ref 8.7–10.2)
CHLORIDE SERPL-SCNC: 98 MMOL/L (ref 96–106)
CHOLEST SERPL-MCNC: 178 MG/DL (ref 100–199)
CO2 SERPL-SCNC: 24 MMOL/L (ref 20–29)
CREAT SERPL-MCNC: 0.74 MG/DL (ref 0.57–1)
EOSINOPHIL # BLD AUTO: 0.3 X10E3/UL (ref 0–0.4)
EOSINOPHIL NFR BLD AUTO: 3 %
ERYTHROCYTE [DISTWIDTH] IN BLOOD BY AUTOMATED COUNT: 14.1 % (ref 12.3–15.4)
GFR SERPLBLD CREATININE-BSD FMLA CKD-EPI: 108 ML/MIN/1.73
GFR SERPLBLD CREATININE-BSD FMLA CKD-EPI: 94 ML/MIN/1.73
GLOBULIN SER CALC-MCNC: 3.4 G/DL (ref 1.5–4.5)
GLUCOSE SERPL-MCNC: 84 MG/DL (ref 65–99)
HCT VFR BLD AUTO: 39.5 % (ref 34–46.6)
HDLC SERPL-MCNC: 64 MG/DL
HGB BLD-MCNC: 13.1 G/DL (ref 11.1–15.9)
IMM GRANULOCYTES # BLD: 0 X10E3/UL (ref 0–0.1)
IMM GRANULOCYTES NFR BLD: 0 %
INTERPRETATION, 910389: NORMAL
LDLC SERPL CALC-MCNC: 98 MG/DL (ref 0–99)
LYMPHOCYTES # BLD AUTO: 1.8 X10E3/UL (ref 0.7–3.1)
LYMPHOCYTES NFR BLD AUTO: 21 %
MCH RBC QN AUTO: 28.4 PG (ref 26.6–33)
MCHC RBC AUTO-ENTMCNC: 33.2 G/DL (ref 31.5–35.7)
MCV RBC AUTO: 86 FL (ref 79–97)
MONOCYTES # BLD AUTO: 0.4 X10E3/UL (ref 0.1–0.9)
MONOCYTES NFR BLD AUTO: 5 %
NEUTROPHILS # BLD AUTO: 5.9 X10E3/UL (ref 1.4–7)
NEUTROPHILS NFR BLD AUTO: 71 %
PLATELET # BLD AUTO: 369 X10E3/UL (ref 150–379)
POTASSIUM SERPL-SCNC: 3.8 MMOL/L (ref 3.5–5.2)
PROT SERPL-MCNC: 8 G/DL (ref 6–8.5)
RBC # BLD AUTO: 4.61 X10E6/UL (ref 3.77–5.28)
SODIUM SERPL-SCNC: 142 MMOL/L (ref 134–144)
TRIGL SERPL-MCNC: 78 MG/DL (ref 0–149)
TSH SERPL DL<=0.005 MIU/L-ACNC: 1.03 UIU/ML (ref 0.45–4.5)
VIT B12 SERPL-MCNC: 254 PG/ML (ref 232–1245)
VLDLC SERPL CALC-MCNC: 16 MG/DL (ref 5–40)
WBC # BLD AUTO: 8.4 X10E3/UL (ref 3.4–10.8)

## 2018-06-15 RX ORDER — ERGOCALCIFEROL 1.25 MG/1
50000 CAPSULE ORAL
Qty: 12 CAP | Refills: 0 | Status: SHIPPED | OUTPATIENT
Start: 2018-06-15 | End: 2018-09-21 | Stop reason: SDUPTHER

## 2018-06-15 NOTE — PROGRESS NOTES
Vit D levels low- rx for weekly high dose replacement sent to pharmacy on record, RTC in 12 weeks for recheck of levels. All other labs look good.

## 2018-06-18 NOTE — PROGRESS NOTES
Spoke with pt, verified , advised pt per provider note, advised of RX at pharmacy, pt had no follow up questions, and verbalized understanding.

## 2018-06-20 NOTE — PROGRESS NOTES
Subjective:     Dillon Keith is a 47 y.o. female who presents for follow up of hypertension, evaluation of fatigue, missed menses. Diet and Lifestyle: generally follows a low fat low cholesterol diet, generally follows a low sodium diet, sedentary, nonsmoker  Home BP Monitoring: is not measured at home    Cardiovascular ROS: taking medications as instructed, no medication side effects noted, no TIA's, no chest pain on exertion, no dyspnea on exertion, no swelling of ankles, no orthostatic dizziness or lightheadedness. New concerns: c/o fatigue x weeks to months. Notes in the past with similar symptoms were found to be r/t anemia and low vitamin d. Hx of gastric bypass about 10 years ago in [place]. Has had no bariatric surgery f/u. Reports missed menses, irregular for the last year. Did have unprotected sex 2 months ago.  She is requesting pregnancy test.    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    Uterine leiomyoma D25.9     No Known Allergies  Past Medical History:   Diagnosis Date    Anemia     Hypertension     Menorrhagia      Past Surgical History:   Procedure Laterality Date    HX GASTRIC BYPASS  2005     Family History   Problem Relation Age of Onset    Hypertension Mother     Diabetes Mother     Cancer Father      prostate    Breast Cancer Paternal Aunt      Social History   Substance Use Topics    Smoking status: Never Smoker    Smokeless tobacco: Never Used    Alcohol use No        Lab Results  Component Value Date/Time   Hemoglobin A1c 5.7 (H) 03/29/2016 09:06 AM   Glucose 84 06/13/2018 02:23 PM   LDL, calculated 98 06/13/2018 02:23 PM   Creatinine 0.74 06/13/2018 02:23 PM      Lab Results  Component Value Date/Time   Cholesterol, total 178 06/13/2018 02:23 PM   HDL Cholesterol 64 06/13/2018 02:23 PM   LDL, calculated 98 06/13/2018 02:23 PM   Triglyceride 78 06/13/2018 02:23 PM     Lab Results  Component Value Date/Time   ALT (SGPT) 22 06/13/2018 02:23 PM   AST (SGOT) 13 06/13/2018 02:23 PM   Alk. phosphatase 155 (H) 06/13/2018 02:23 PM   Bilirubin, total 0.4 06/13/2018 02:23 PM   Albumin 4.6 06/13/2018 02:23 PM   Protein, total 8.0 06/13/2018 02:23 PM   PLATELET 421 98/72/0211 02:23 PM       Lab Results  Component Value Date/Time   GFR est non-AA 94 06/13/2018 02:23 PM   GFR est  06/13/2018 02:23 PM   Creatinine 0.74 06/13/2018 02:23 PM   BUN 17 06/13/2018 02:23 PM   Sodium 142 06/13/2018 02:23 PM   Potassium 3.8 06/13/2018 02:23 PM   Chloride 98 06/13/2018 02:23 PM   CO2 24 06/13/2018 02:23 PM   Magnesium 2.3 12/14/2017 10:56 AM        Review of Systems, additional:  A comprehensive review of systems was negative except for that written in the HPI. Objective:     Visit Vitals    /77    Pulse 78    Temp 98 °F (36.7 °C) (Oral)    Resp 18    Ht 5' 10\" (1.778 m)    Wt 236 lb 8 oz (107.3 kg)    SpO2 97%    BMI 33.93 kg/m2     Appearance: alert, well appearing, and in no distress, oriented to person, place, and time, overweight and well hydrated. General exam: CVS exam BP noted to be well controlled today in office, S1, S2 normal, no gallop, no murmur, chest clear, no JVD, no HSM, no edema, peripheral vascular exam both carotids normal upstroke without bruits, neurological exam alert, oriented, normal speech, no focal findings or movement disorder noted. Negative urine pregnancy test    Assessment/Plan:     hypertension well controlled. reviewed diet, exercise and weight control  cardiovascular risk and specific lipid/LDL goals reviewed  reviewed medications and side effects in detail  reviewed potential future medication changes and side effects. Diagnoses and all orders for this visit:    1. Essential hypertension  At goal  Continue maxzide  DASH diet  Weight loss  Daily walking  -     METABOLIC PANEL, COMPREHENSIVE    2.  Other iron deficiency anemia  -     ferrous sulfate 325 mg (65 mg iron) tablet; TAKE ONE TABLET BY MOUTH THREE TIMES A DAY WITH MEALS  -     CBC WITH AUTOMATED DIFF    3. Status post gastric bypass for obesity  -     REFERRAL TO BARIATRIC SURGERY    4. Missed menses  -     AMB POC URINE PREGNANCY TEST, VISUAL COLOR COMPARISON    5. Fatigue, unspecified type  -     TSH 3RD GENERATION    6. Screening for hypercholesterolemia  -     LIPID PANEL    7. Vitamin D deficiency  -     VITAMIN D, 25 HYDROXY    8. Impaired folate absorption  -     VITAMIN B12    Other orders  -     CVD REPORT  -     ergocalciferol (ERGOCALCIFEROL) 50,000 unit capsule; Take 1 Cap by mouth every seven (7) days. Follow-up Disposition:  Return in about 6 weeks (around 7/25/2018) for f/u fatigue. I have discussed the diagnosis with the patient and the intended plan as seen in the above orders. The patient has received an after-visit summary and questions were answered concerning future plans. Patient conveyed understanding of the plan at the time of the visit.     Katia Martinez, VINNY

## 2018-06-21 ENCOUNTER — OFFICE VISIT (OUTPATIENT)
Dept: SURGERY | Age: 53
End: 2018-06-21

## 2018-06-21 VITALS
HEART RATE: 89 BPM | OXYGEN SATURATION: 98 % | DIASTOLIC BLOOD PRESSURE: 62 MMHG | SYSTOLIC BLOOD PRESSURE: 106 MMHG | BODY MASS INDEX: 34.22 KG/M2 | RESPIRATION RATE: 18 BRPM | WEIGHT: 239 LBS | HEIGHT: 70 IN

## 2018-06-21 DIAGNOSIS — E61.1 IRON DEFICIENCY: ICD-10-CM

## 2018-06-21 DIAGNOSIS — E66.01 MORBID OBESITY (HCC): Primary | ICD-10-CM

## 2018-06-21 DIAGNOSIS — Z98.84 S/P GASTRIC BYPASS: ICD-10-CM

## 2018-06-21 DIAGNOSIS — K90.9 INTESTINAL MALABSORPTION, UNSPECIFIED TYPE: ICD-10-CM

## 2018-06-21 DIAGNOSIS — E53.8 FOLATE DEFICIENCY: ICD-10-CM

## 2018-06-21 DIAGNOSIS — R63.5 WEIGHT GAIN: ICD-10-CM

## 2018-06-21 DIAGNOSIS — E51.9 VITAMIN B1 DEFICIENCY: ICD-10-CM

## 2018-06-21 NOTE — MR AVS SNAPSHOT
[address] [facility] [id number] [phone] Patient: Danielle Norton MRN: [id number] :1965 Visit Information Date & Time Provider Department Dept. Phone Encounter #  
 2018  3:40 PM Mauro Vasquez NP [facility] 22 226 [phone] [id number] Follow-up Instructions Routing History Upcoming Health Maintenance Date Due DTaP/Tdap/Td series (1 - Tdap) 1987 FOBT Q 1 YEAR AGE 50-75 2016 BREAST CANCER SCRN MAMMOGRAM 2018 Influenza Age 5 to Adult 2018 PAP AKA CERVICAL CYTOLOGY 2021 Allergies as of 2018  Review Complete On: 2018 By: Brenda Voss LPN No Known Allergies Current Immunizations  Reviewed on 2016 No immunizations on file. Not reviewed this visit You Were Diagnosed With   
  
 Codes Comments [facility]    -  Primary ICD-10-CM: E66.01 
ICD-9-CM: 278.01 S/P gastric bypass     ICD-10-CM: V90.09 ICD-9-CM: V45.86 Intestinal malabsorption, unspecified type     ICD-10-CM: K90.9 ICD-9-CM: 579.9 Weight gain     ICD-10-CM: R63.5 ICD-9-CM: 783.1 Vitamin B1 deficiency     ICD-10-CM: E51.9 ICD-9-CM: 265.1 Folate deficiency     ICD-10-CM: E53.8 ICD-9-CM: 266.2 Iron deficiency     ICD-10-CM: E61.1 ICD-9-CM: 280.9 BMI 34.0-34.9,adult     ICD-10-CM: Z28.38 
ICD-9-CM: V85.34 Vitals BP Pulse Resp Height(growth percentile) Weight(growth percentile) SpO2  
 106/62 (BP 1 Location: Right arm, BP Patient Position: Sitting) 89 18 5' 10\" (1.778 m) 239 lb (108.4 kg) 98% BMI OB Status Smoking Status 34.29 kg/m2 Menopause Never Smoker Vitals History BMI and BSA Data Body Mass Index Body Surface Area  
 34.29 kg/m 2 2.31 m 2 Preferred Pharmacy Pharmacy Name Phone [facility] [address], [address] [phone] Your Updated Medication List  
  
   
This list is accurate as of 6/21/18 11:59 PM.  Always use your most recent med list.  
  
  
  
  
 cyanocobalamin 1,000 mcg/mL injection Commonly known as:  VITAMIN B-12  
1 mL by IntraMUSCular route every thirty (30) days. diclofenac 1 % Gel Commonly known as:  VOLTAREN Apply 2 g to affected area four (4) times daily. ergocalciferol 50,000 unit capsule Commonly known as:  ERGOCALCIFEROL Take 1 Cap by mouth every seven (7) days. ferrous sulfate 325 mg (65 mg iron) tablet TAKE ONE TABLET BY MOUTH THREE TIMES A DAY WITH MEALS  
  
 triamterene-hydroCHLOROthiazide 75-50 mg per tablet Commonly known as:  Sondra Solano Take 1 Tab by mouth daily. We Performed the Following FOLATE Y7153722 CPT(R)] IRON PROFILE [id number] CPT(R)] PREALBUMIN [35424 CPT(R)] PTH INTACT [63800 CPT(R)] REFERRAL TO GASTROENTEROLOGY [IYA42 Custom] VITAMIN B1, WHOLE BLOOD [id number] CPT(R)] To-Do List   
 06/21/2018 Imaging:  XR UPPER GI SERIES W KUB Referral Information Referral ID Referred By Referred To  
  
 [id number] Silvia Banda Not Available Visits Status Start Date End Date 1 New Request 6/21/18 6/21/19 If your referral has a status of pending review or denied, additional information will be sent to support the outcome of this decision. Introducing [facility]! Dear Susu Espinosa: Thank you for requesting a Blendagram account. Our records indicate that you already have an active Blendagram account. You can access your account anytime at [email or web address] Did you know that you can access your hospital and ER discharge instructions at any time in Blendagram? You can also review all of your test results from your hospital stay or ER visit. Additional Information If you have questions, please visit the Frequently Asked Questions section of the [employer] website at [email or web address] Remember, Subimage is NOT to be used for urgent needs. For medical emergencies, dial 911. Now available from your iPhone and Android! Please provide this summary of care documentation to your next provider. Your primary care clinician is listed as Abdirahman Nicholson. If you have any questions after today's visit, please call [phone].

## 2018-06-21 NOTE — PROGRESS NOTES
Bri Stevens is a 47 y.o. female that comes in office today as a new patient for weight management. Patient underwent laparoscopic Jean Paul-en-Y gastric bypass in . Patient stated initial weight prior to surgery was 265 pounds. Stated her lowest weight was 165 pounds. Stated he was able to maintain the weight for about 7 years and now weight has been increasing for the past 2-3 years. Weight today is 239 pounds. Denies nausea, no vomiting, no heartburn/reflux. Denies dysphagia. No fever/no chills, no shortness of breath, no chest pain, and no abdominal pain. Tolerating all foods. Diet recall: breakfast is oatmeal, lunch is eating out with burger, and dinner is eating out. Also admits to eating out frequently because she is single and work schedule. Protein supplementation not in use. Snacking fruit. Admits to stress eating. Drinking water daily. Also occasional soda drinking. Tolerating all vitamins and medications. Exercising minimal due to recent bilateral foot surgery. No issues with urination. Bowel movements once daily that are formed. Stated she has had follow up with primary care to monitor for any iron and vitamin deficiencies. Denies smoking, no alcohol consumption, and no NSAID usage. Stated she does get dumping syndrome with too much carbohydrate. Past Medical History:   Diagnosis Date    Anemia     Hypertension     Menorrhagia      Past Surgical History:   Procedure Laterality Date    HX  SECTION      HX GASTRIC BYPASS      HX NEPHRECTOMY       Current Outpatient Prescriptions   Medication Sig    ergocalciferol (ERGOCALCIFEROL) 50,000 unit capsule Take 1 Cap by mouth every seven (7) days.  ferrous sulfate 325 mg (65 mg iron) tablet TAKE ONE TABLET BY MOUTH THREE TIMES A DAY WITH MEALS    triamterene-hydroCHLOROthiazide (MAXZIDE) 75-50 mg per tablet Take 1 Tab by mouth daily.  diclofenac (VOLTAREN) 1 % gel Apply 2 g to affected area four (4) times daily.  (Patient not taking: Reported on 6/13/2018)    cyanocobalamin (VITAMIN B-12) 1,000 mcg/mL injection 1 mL by IntraMUSCular route every thirty (30) days. No current facility-administered medications for this visit. HPI    Review of Systems   Constitutional: Negative for chills, fever, malaise/fatigue and weight loss. Respiratory: Negative for cough, sputum production and shortness of breath. Cardiovascular: Negative for chest pain, palpitations and leg swelling. Gastrointestinal: Negative for abdominal pain, blood in stool, constipation, diarrhea, heartburn, nausea and vomiting. Genitourinary: Negative for dysuria, hematuria and urgency. Musculoskeletal: Positive for back pain and joint pain. Skin: Negative for itching and rash. Neurological: Negative for dizziness. Physical Exam   Constitutional: She is oriented to person, place, and time. She appears well-developed and well-nourished. No distress. Cardiovascular: Normal rate, regular rhythm and normal heart sounds. Pulmonary/Chest: Effort normal and breath sounds normal.   Abdominal: Soft. Bowel sounds are normal. She exhibits no distension. There is no tenderness. There is no rebound and no guarding. Previous lap sites dry and intact with presence of keloids. Musculoskeletal: Normal range of motion. She exhibits no edema. Neurological: She is alert and oriented to person, place, and time. Skin: Skin is warm and dry. No rash noted. No erythema. Psychiatric: She has a normal mood and affect. Her behavior is normal. Thought content normal.   Blood pressure 106/62, pulse 89, resp. rate 18, height 5' 10\" (1.778 m), weight 239 lb (108.4 kg), SpO2 98 %. ASSESSMENT and PLAN  Morbid Obesity 13 years status post lap gastric bypass. Weight today is 239 pounds. Patient to be scheduled for Upper GI and endoscopy to evaluate gastric bypass anatomy. Patient desires to have revisional surgery.  Patient will attempt to get operative records. Discussed the risk and benefits of revision of malabsorptive gastric bypass. Provided patient with requirement list of her health insurance carrier. Discussed in detail behavior/habits that are affecting weight gain. Advised if no physical activity, no snacking. Advised patient regard to diet that is high-protein, low-fat, low-sugar, limited carbohydrates. Strive for 60 grams of protein daily. If having a snack, foods that are protein or fiber rich. Still pay attention to behavioral factor and habits. No eating/drinking together, chew foods well, and portion control. Patient to discontinue all carbonated beverages. Drink at least 40 ounces of non-carbonated, non-calorie beverages daily.  Updated bariatric educational booklet given. Recommend patient to trial liver shrinking diet. Patient to meet with nutritionist.  Continue vitamin regiment daily. Exercise at least 3 days a week with cardiovascular and strength training as tolerated. Provided patient with routine lab work slip. Advised anything concerning with labs, will contact patient prior to next visit. Patient to attend support group.  Patient to follow up in status post Upper GI and endoscopy. Advised to call office if any questions/concerns.      49 minutes was spent with patient, greater than 50% of time counseling.

## 2018-06-21 NOTE — PROGRESS NOTES
Bariatric patient. 1. Have you been to the ER, urgent care clinic since your last visit? Hospitalized since your last visit? No    2. Have you seen or consulted any other health care providers outside of the [address] [place] [address] since your last visit? Include any pap smears or colon screening. No     Carito Cadena  Body composition    female  47 y.o. Vitals:    06/21/18 1524   BP: 106/62   Pulse: 89   Resp: 18   SpO2: 98%   Weight: 238 lb 6.4 oz (108.1 kg)   Height: 5' 10\" (1.778 m)     Body mass index is 34.21 kg/(m^2). [place]ck- 14.5\"  Waist 41.5\"  Hips-50\"  Frame size-Medium

## 2018-06-24 LAB
FOLATE SERPL-MCNC: 10.7 NG/ML
IRON SATN MFR SERPL: 12 % (ref 15–55)
IRON SERPL-MCNC: 44 UG/DL (ref 27–159)
PREALB SERPL-MCNC: 27 MG/DL (ref 10–36)
PTH-INTACT SERPL-MCNC: 32 PG/ML (ref 15–65)
TIBC SERPL-MCNC: 363 UG/DL (ref 250–450)
UIBC SERPL-MCNC: 319 UG/DL (ref 131–425)
VIT B1 BLD-SCNC: 116.9 NMOL/L (ref 66.5–200)

## 2018-07-10 ENCOUNTER — TELEPHONE (OUTPATIENT)
Dept: SURGERY | Age: 53
End: 2018-07-10

## 2018-07-10 NOTE — TELEPHONE ENCOUNTER
I called the patient back and I let her know that we need the results of her Egd and she said she just had it done this morning. She asked about her insurance and authorization and I gave her [facility] phone number and she asked about the seminar as well. Pt in agreement.

## 2018-07-18 ENCOUNTER — CLINICAL SUPPORT (OUTPATIENT)
Dept: SURGERY | Age: 53
End: 2018-07-18

## 2018-07-18 VITALS — BODY MASS INDEX: 34.72 KG/M2 | WEIGHT: 242 LBS

## 2018-07-18 DIAGNOSIS — E66.9 OBESITY (BMI 30.0-34.9): Primary | ICD-10-CM

## 2018-07-18 NOTE — PROGRESS NOTES
Bariatric Nutrition Evaluation    Date: 2018   Physician/Surgeon/Provider: Quang Davey N.P.   Name: Tomeka Wilks  :  1965  Age:  47  Gender: Female   Type of Surgery: [x]           Gastric Bypass  [x]           LAGB  []           Sleeve Gastrectomy    ASSESSMENT:    Past Medical History:HTN, GERD      Medications/Supplements:   Prior to Admission medications    Medication Sig Start Date End Date Taking? Authorizing Provider   ergocalciferol (ERGOCALCIFEROL) 50,000 unit capsule Take 1 Cap by mouth every seven (7) days. 6/15/18   Jeane Cola, NP   ferrous sulfate 325 mg (65 mg iron) tablet TAKE ONE TABLET BY MOUTH THREE TIMES A DAY WITH MEALS 18   Jeane Mancia NP   triamterene-hydroCHLOROthiazide (MAXZIDE) 75-50 mg per tablet Take 1 Tab by mouth daily. 18   Jaswinder Mccormick MD   diclofenac (VOLTAREN) 1 % gel Apply 2 g to affected area four (4) times daily. Patient not taking: Reported on 2018   Jaswinder Mccormick MD   cyanocobalamin (VITAMIN B-12) 1,000 mcg/mL injection 1 mL by IntraMUSCular route every thirty (30) days. 17   Jaswinder Mccormick MD       Food Allergies/Intolerances: allergic to eggs     Anthropometrics:    Ht:5' 10\"    Wt: 242#    IBW: 150#     %IBW: 161%     BMI:34     Category: obesity I    Reported wt history:Pt presents today for nutrition evaluation. Reports having previous wt loss surgery in [facility] in . At that time pt thought she had a gastric bypass. Presented to our office last month to discuss possible revision surgery d/t wt regain. Has since been told that she actually had a gastric band based on testing she had to complete. Pt was previously considering a revision surgery and is now considering options to utilize the band. States she has never had a band adjustment. Does feel restriction when eating and is not able to eat regular portions nor eat and drink at the same time.  Attributes majority of wt gain d/t stress and emotional eating over the past 2 years. Had foot surgery in February 2018 and has not been exercising since then. Overall pt is aware of the habits and behaviors that have caused wt gain. 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 Is asking about options for following a liquid diet in anticipation of this event. Pt has completed required testing and needs to meet with surgeon to discuss ability to use band vs options for revision surgery. Exercise/Physical Activity:none at present; recently joined a gym to restart exercise     Reported Diet History:Atkins, Weight Watchers, diet pills, liquid diets, fasting, exercise; previous LAGB in [place] in 2005     24 Hour Diet Recall  Breakfast  Coffee, oatmeal, fruit    Lunch  Take out or restaurant ([facility] [employer])   Dinner  Take out or restaurant (same as above)    [employer], chips, crackers    Beverages  Water, coffee      A pre-op nutrition checklist was reviewed. Patient checked off 5 of 15 items. NUTRITION DIAGNOSIS:  1. Self-monitoring deficit r/t previous lack of value for this change evidenced by reports emotional eating as main contributor to wt arianna. 2. Physical inactivity r/t foot surgery that has limited exercise evidenced by pt with no exercise for the past 5 months. 3. Limited adherence to nutrition recommendations r/t lack of prior exposure to information evidenced by wt gain d/t limited compliance with nutrition guidelines for LAGB. Pt states she thought she has had a gastric bypass all of these years and most recently found out she has an adjustable gastric band. Has never received nutrition education specific to gastric banding. NUTRITION INTERVENTION:  Pt educated on nutrition recommendations for weight loss surgery, specifically LAGB. Instructed on consuming 3 meals per day starting now.   Focus on lean protein, fruits, veggies at each meal. If band is adjusted, pt was instructed to begin measuring meals to 1/2 cup. Each meal will contain a 1/4 cup lean protein and 1/4 cup fruit, non-starchy vegetable or starch (limiting to once per day). Aim for 60 g protein per day. Sip on 48-64 oz of sugar free, calorie free, non-carbonated beverages each day. Do not use a straw. Do not consume beverages 30 minutes before, during or 60 minutes after meals. Okay to follow the liver shrinking diet for the next 2 weeks with the understanding that this is to provide a mental and physical \"re-set\" and motivation. NUTRITION MONITORING AND EVALUATION:    The following goals were established with patient;  1. Keep a food and mood journal to better monitor stress eating behaviors. 2. Eat 3 meals a day. Okay to replace 1-2 meals per day with a protein shake as a means to get back on track with wt loss. 3. Focus on lean protein and fruits/veggies at most meals. Think of solid textured foods as providing better physical and mental satiety. 4. Drink only calorie-free fluids and avoid drinking 30 minutes before, during and 60 minutes after meals. 5. Resume exercise. 6. Follow up with RD in 2 weeks. Specific tips and techniques to facilitate compliance with above recommendations were provided and discussed. Pt was strongly encourage to begin making necessary changes now, attend support group, and re-visit the dietitian prn. Nutrition evaluation reveals that important lifestyle and behavior changes are indicated to get back on track. Pt with LAGB and previously thought she had a gastric bypass. Will need to discuss options for utilizing the band with the surgeon based on testing she has completed. Follow up with RD in 2 weeks for continued back on track counseling. If further details are desired please feel free to contact me at [phone]. This phone number was also provided to the patient for any further questions or concerns.            Jennifer Forte RD

## 2018-07-23 ENCOUNTER — HOSPITAL ENCOUNTER (OUTPATIENT)
Dept: GENERAL RADIOLOGY | Age: 53
Discharge: HOME | End: 2018-07-23
Attending: NURSE PRACTITIONER
Payer: COMMERCIAL

## 2018-07-23 DIAGNOSIS — E61.1 IRON DEFICIENCY: ICD-10-CM

## 2018-07-23 DIAGNOSIS — E66.01 MORBID OBESITY (HCC): ICD-10-CM

## 2018-07-23 DIAGNOSIS — K90.9 INTESTINAL MALABSORPTION, UNSPECIFIED TYPE: ICD-10-CM

## 2018-07-23 DIAGNOSIS — E53.8 FOLATE DEFICIENCY: ICD-10-CM

## 2018-07-23 DIAGNOSIS — R63.5 WEIGHT GAIN: ICD-10-CM

## 2018-07-23 DIAGNOSIS — E51.9 VITAMIN B1 DEFICIENCY: ICD-10-CM

## 2018-07-23 DIAGNOSIS — Z98.84 S/P GASTRIC BYPASS: ICD-10-CM

## 2018-07-23 PROCEDURE — 74241 XR UPPER GI SERIES W KUB: CPT

## 2018-08-02 ENCOUNTER — OFFICE VISIT (OUTPATIENT)
Dept: SURGERY | Age: 53
End: 2018-08-02

## 2018-08-02 VITALS
DIASTOLIC BLOOD PRESSURE: 78 MMHG | BODY MASS INDEX: 33.79 KG/M2 | HEART RATE: 108 BPM | TEMPERATURE: 98.2 F | WEIGHT: 236 LBS | RESPIRATION RATE: 18 BRPM | SYSTOLIC BLOOD PRESSURE: 138 MMHG | OXYGEN SATURATION: 98 % | HEIGHT: 70 IN

## 2018-08-02 DIAGNOSIS — R63.5 WEIGHT GAIN, ABNORMAL: ICD-10-CM

## 2018-08-02 DIAGNOSIS — K21.9 GASTROESOPHAGEAL REFLUX DISEASE WITHOUT ESOPHAGITIS: Primary | ICD-10-CM

## 2018-08-02 NOTE — PROGRESS NOTES
1. Have you been to the ER, urgent care clinic since your last visit? Hospitalized since your last visit? No    2. Have you seen or consulted any other health care providers outside of the [facility] since your last visit? Include any pap smears or colon screening. No    Thaddeus Magana  Body composition    female  47 y.o. Vitals:    08/02/18 1553   BP: 138/78   Pulse: (!) 108   Resp: 18   Temp: 98.2 °F (36.8 °C)   TempSrc: Oral   SpO2: 98%   Weight: 236 lb (107 kg)   Height: 5' 10\" (1.778 m)     Body mass index is 33.86 kg/(m^2).

## 2018-08-03 NOTE — PATIENT INSTRUCTIONS
Abnormal Weight Gain: Care Instructions  Your Care Instructions    There are two types of weight gain-normal and abnormal. Normal weight gain is usually caused by eating too much or exercising too little. It can also happen as you get older. But abnormal weight gain has other causes. It can be caused by a problem with your thyroid gland, called hypothyroidism. Or it can be caused by a problem with your adrenal glands, called Cushing's syndrome. Or your body could be holding too much fluid because of kidney, liver, or heart problems. In some cases, a medicine you take can cause you to gain weight. You can work with your doctor to find out the cause of your weight gain. You will probably need tests to do this. Follow-up care is a key part of your treatment and safety. Be sure to make and go to all appointments, and call your doctor if you are having problems. It's also a good idea to know your test results and keep a list of the medicines you take. How can you care for yourself at home? · Weigh yourself at the same time every day. It's best to do it first thing in the morning after you empty your bladder. Be sure to always wear the same amount of clothing. · Write down any changes in your weight and the possible causes. Discuss these with your doctor. · Your doctor may want you to change your diet and exercise habits. A good way to lose weight is to reduce calories and increase exercise. · Walking is an easy way to get exercise. Try to walk a little longer every day. You also may want to swim, bike, or do other activities. · Ask your doctor if you should see a dietitian. This is a person who can help you plan meals that work best for your lifestyle. · If your doctor prescribed medicines, take them exactly as prescribed. Call your doctor if you think you are having a problem with your medicine. You will get more details on the specific medicines your doctor prescribes.   When should you call for help?  Watch closely for changes in your health, and be sure to contact your doctor if:    · You do not get better as expected.     · You continue to gain weight. Where can you learn more? Go to http://zeyad-cecile.info/. Enter A175 in the search box to learn more about \"Abnormal Weight Gain: Care Instructions. \"  Current as of: October 9, 2017  Content Version: 11.7  © 5119-0432 [employer]. Care instructions adapted under license by [employer] (which disclaims liability or warranty for this information). If you have questions about a medical condition or this instruction, always ask your healthcare professional. [address] any warranty or liability for your use of this information.

## 2018-08-08 NOTE — PROGRESS NOTES
Subjective: The patient is a 47 y.o. obese female S/P laparoscopic gastric bypass in [place] in . Post-op course complicated by GERD symptoms, weight regain. Body mass index is 33.86 kg/(m^2). She has been evaluated by Laurel Carson RD; she has embraced some of the necessary behavioral changes and has decreased frequency of eating out; she has lost 6 lbs since previous RD visit. Bariatric comorbidities present are   Past Medical History:   Diagnosis Date    Anemia     Hypertension     Menorrhagia        Patient Active Problem List    Diagnosis Date Noted    Weight gain, abnormal 2018    Gastroesophageal reflux disease without esophagitis 2018    Uterine leiomyoma 2016    Essential hypertension 2016    Status post gastric bypass for obesity 2016    Cheloid scar 2016     Past Medical History:   Diagnosis Date    Anemia     Hypertension     Menorrhagia       Past Surgical History:   Procedure Laterality Date    HX  SECTION      HX GASTRIC BYPASS      HX NEPHRECTOMY        Social History   Substance Use Topics    Smoking status: Never Smoker    Smokeless tobacco: Never Used    Alcohol use No      Family History   Problem Relation Age of Onset    Hypertension Mother     Diabetes Mother     Cancer Mother      cervical    Heart Disease Mother     Prostate Cancer Father     Hypertension Father     Breast Cancer Paternal Aunt     Uterine Cancer Sister     Diabetes Sister       Prior to Admission medications    Medication Sig Start Date End Date Taking? Authorizing Provider   ergocalciferol (ERGOCALCIFEROL) 50,000 unit capsule Take 1 Cap by mouth every seven (7) days. 6/15/18  Yes Oleksandr Rizzo NP   triamterene-hydroCHLOROthiazide (MAXZIDE) 75-50 mg per tablet Take 1 Tab by mouth daily. 18  Yes Neeraj Thurston MD   diclofenac (VOLTAREN) 1 % gel Apply 2 g to affected area four (4) times daily.  17  Yes Neeraj Thurston MD cyanocobalamin (VITAMIN B-12) 1,000 mcg/mL injection 1 mL by IntraMUSCular route every thirty (30) days. 8/28/17  Yes Mary Bella MD   ferrous sulfate 325 mg (65 mg iron) tablet TAKE ONE TABLET BY MOUTH THREE TIMES A DAY WITH MEALS 6/13/18   Buster Malin NP     No Known Allergies        Objective:     Visit Vitals    /78 (BP 1 Location: Left arm, BP Patient Position: Sitting)    Pulse (!) 108    Temp 98.2 °F (36.8 °C) (Oral)    Resp 18    Ht 5' 10\" (1.778 m)    Wt 236 lb (107 kg)    SpO2 98%    BMI 33.86 kg/m2       Assessment:     Weight regain, GERD, abdominal pain after gastric bypass in 2005. She has demonstrated some improvement in behavior. Plan:     1. Upper endoscopy to assess size of pouch and anastomosis. 2. Upper GI series with barium/cottage cheese to better assess capacity of pouch. 3. Continue behavioral changes and follow-up with Dietician. 4. Follow-up with me after above. 25 minutes spent with patient (greater than 50% of time in face-face consultation reviewing past gastric bypass, technical aspects of revision, need for behavior improvement, remaining work-up).       Signed By: Whitney Dave MD     August 8, 2018

## 2018-09-14 ENCOUNTER — OFFICE VISIT (OUTPATIENT)
Dept: FAMILY MEDICINE CLINIC | Age: 53
End: 2018-09-14

## 2018-09-14 VITALS
RESPIRATION RATE: 18 BRPM | DIASTOLIC BLOOD PRESSURE: 65 MMHG | HEART RATE: 87 BPM | TEMPERATURE: 98.2 F | SYSTOLIC BLOOD PRESSURE: 100 MMHG | HEIGHT: 70 IN | OXYGEN SATURATION: 95 % | WEIGHT: 243 LBS | BODY MASS INDEX: 34.79 KG/M2

## 2018-09-14 DIAGNOSIS — Z98.84 STATUS POST GASTRIC BYPASS FOR OBESITY: ICD-10-CM

## 2018-09-14 DIAGNOSIS — E55.9 VITAMIN D DEFICIENCY: ICD-10-CM

## 2018-09-14 DIAGNOSIS — E66.9 OBESITY (BMI 30-39.9): ICD-10-CM

## 2018-09-14 DIAGNOSIS — L65.9 ALOPECIA: ICD-10-CM

## 2018-09-14 DIAGNOSIS — R74.8 ELEVATED ALKALINE PHOSPHATASE LEVEL: ICD-10-CM

## 2018-09-14 DIAGNOSIS — G47.8 UNREFRESHED BY SLEEP: Primary | ICD-10-CM

## 2018-09-14 DIAGNOSIS — R40.0 DAYTIME SOMNOLENCE: ICD-10-CM

## 2018-09-14 RX ORDER — CLOBETASOL PROPIONATE 0.46 MG/ML
SOLUTION TOPICAL
Qty: 50 ML | Refills: 2 | Status: SHIPPED | OUTPATIENT
Start: 2018-09-14 | End: 2020-03-02 | Stop reason: SDUPTHER

## 2018-09-14 NOTE — PROGRESS NOTES
1. Have you been to the ER, urgent care clinic since your last visit? Hospitalized since your last visit? No 
 
2. Have you seen or consulted any other health care providers outside of the [address] [place] [address] since your last visit? Include any pap smears or colon screening. No  
 
Chief Complaint Patient presents with  Fatigue  Alopecia

## 2018-09-14 NOTE — MR AVS SNAPSHOT
[address] [place] [id number] 
[phone] Patient: Hussein Gómez MRN: [id number] :[id number] Visit Information Date & Time Provider Department Dept. Phone Encounter #  
 2018  8:30 AM Isael Rasmussen MD [address] [place] [address] [id number] Upcoming Health Maintenance Date Due DTaP/Tdap/Td series (1 - Tdap) 1987 FOBT Q 1 YEAR AGE 50-75 2016 Influenza Age 5 to Adult 2018 BREAST CANCER SCRN MAMMOGRAM 2018 PAP AKA CERVICAL CYTOLOGY 2021 Allergies as of 2018  Review Complete On: 2018 By: Isael Rasmussen MD  
 No Known Allergies Current Immunizations  Reviewed on 2016 No immunizations on file. Not reviewed this visit You Were Diagnosed With   
  
 Codes Comments Unrefreshed by sleep    -  Primary ICD-10-CM: G47.8 ICD-9-CM: 780.59 Daytime somnolence     ICD-10-CM: R40.0 ICD-9-CM: 780.54 Status post gastric bypass for obesity     ICD-10-CM: Z98.84 ICD-9-CM: V45.86 Obesity (BMI 30-39. 9)     ICD-10-CM: E66.9 ICD-9-CM: 278.00 Alopecia     ICD-10-CM: L65.9 ICD-9-CM: 704.00 Vitamin D deficiency     ICD-10-CM: E55.9 ICD-9-CM: 268.9 Vitals BP Pulse Temp Resp Height(growth percentile) Weight(growth percentile) 100/65 87 98.2 °F (36.8 °C) (Oral) 18 5' 10\" (1.778 m) 243 lb (110.2 kg) SpO2 BMI OB Status Smoking Status 95% 34.87 kg/m2 Menopause Never Smoker Vitals History BMI and BSA Data Body Mass Index Body Surface Area 34.87 kg/m 2 2.33 m 2 Preferred Pharmacy Pharmacy Name Phone [facility] [address], [address] [phone] Your Updated Medication List  
  
   
This list is accurate as of 18  9:11 AM.  Always use your most recent med list.  
  
  
  
  
 clobetasol 0.05 % external solution Commonly known as:  Acquanetta Doctor Apply to scalp once daily  
  
 cyanocobalamin 1,000 mcg/mL injection Commonly known as:  VITAMIN B-12  
1 mL by IntraMUSCular route every thirty (30) days. diclofenac 1 % Gel Commonly known as:  VOLTAREN Apply 2 g to affected area four (4) times daily. ergocalciferol 50,000 unit capsule Commonly known as:  ERGOCALCIFEROL Take 1 Cap by mouth every seven (7) days. ferrous sulfate 325 mg (65 mg iron) tablet TAKE ONE TABLET BY MOUTH THREE TIMES A DAY WITH MEALS  
  
 naltrexone-buPROPion 8-90 mg Tber ER tablet Commonly known as:  Carlos Nahum Week 1 1 tab PO QAM, Week 2 1QAM 1QHS, Week 3 2QAM 1 QHS, Week 4 & beyond 2QAM 2QHS  
  
 triamterene-hydroCHLOROthiazide 75-50 mg per tablet Commonly known as:  Jerral Cullens Take 1 Tab by mouth daily. Prescriptions Sent to Pharmacy Refills  
 naltrexone-buPROPion (CONTRAVE) 8-90 mg TbER ER tablet 11 Sig: Week 1 1 tab PO QAM, Week 2 1QAM 1QHS, Week 3 2QAM 1 QHS, Week 4 & beyond 2QAM 2QHS Class: Normal  
 Pharmacy: [address] [place] [address], [address] [address] Ph #: [phone]  
 clobetasol (TEMOVATE) 0.05 % external solution 2 Sig: Apply to scalp once daily Class: Normal  
 Pharmacy: Vladislav Molina [address] [facility], [address] Ph #: [phone] We Performed the Following METABOLIC PANEL, COMPREHENSIVE [58849 CPT(R)] REFERRAL TO DERMATOLOGY [REF19 Custom] Comments:  
 eval and treat for alopecia REFERRAL TO PSYCHOLOGY [OUH08 Custom] Comments:  
 Dr Wilfrid Mortensen [phone] 
eval and treat for obesity SLEEP MEDICINE REFERRAL [DTD885 Custom] Comments:  
 eval and treat for SENTHIL, c/o daytime somnolence and unrefreshed by sleep VITAMIN D, 25 HYDROXY [id number] CPT(R)] Referral Information Referral ID Referred By Referred To  
  
 [id number] Song Davey Not Available Visits Status Start Date End Date 1 New Request 9/14/18 9/14/19 If your referral has a status of pending review or denied, additional information will be sent to support the outcome of this decision. Referral ID Referred By Referred To  
 [id number] Aneesh [facility] [address] [place], [address] Visits Status Start Date End Date 1 New Request 9/14/18 9/14/19 If your referral has a status of pending review or denied, additional information will be sent to support the outcome of this decision. Referral ID Referred By Referred To  
 [id number] Aneesh Euceda [facility] [place] [address] [facility], [address] Phone: [phone] Fax: [phone] Visits Status Start Date End Date 1 New Request 9/14/18 9/14/19 If your referral has a status of pending review or denied, additional information will be sent to support the outcome of this decision. Introducing hospitals & HEALTH SERVICES! Dear Cristina Cates: Thank you for requesting a Garmentory account. Our records indicate that you already have an active Garmentory account. You can access your account anytime at [email or web address] Did you know that you can access your hospital and ER discharge instructions at any time in Garmentory? You can also review all of your test results from your hospital stay or ER visit. Additional Information If you have questions, please visit the Frequently Asked Questions section of the Garmentory website at [email or web address] Remember, Garmentory is NOT to be used for urgent needs. For medical emergencies, dial 911. Now available from your iPhone and Android! Please provide this summary of care documentation to your next provider. Your primary care clinician is listed as Dany Garcia. If you have any questions after today's visit, please call [phone].

## 2018-09-14 NOTE — PROGRESS NOTES
Chief Complaint Patient presents with  Fatigue  Alopecia  
 
she is a 47y.o. year old female who presents for evalution. She c/o can't sleep. She has trouble relaxing at night . alvaro says she cannot turn Her head off She admits to being a stress eater She had gastric bypass 2005. She is now considering a revision She has hairloss. Has been trying vitamins but not working Has not seen derm yet. This has been happening for more than 3 years. She stopped all chemicals Reviewed PmHx, RxHx, FmHx, SocHx, AllgHx and updated and dated in the chart. Aspirin yes ____   No____ N/A____ Patient Active Problem List  
 Diagnosis  Weight gain, abnormal  
 Gastroesophageal reflux disease without esophagitis  Uterine leiomyoma  Essential hypertension  Status post gastric bypass for obesity  Cheloid scar Nurse notes were reviewed and copied and are correct Review of Systems - negative except as listed above in the HPI Objective:  
 
Vitals:  
 09/14/18 0841 BP: 100/65 Pulse: 87 Resp: 18 Temp: 98.2 °F (36.8 °C) TempSrc: Oral  
SpO2: 95% Weight: 243 lb (110.2 kg) Height: 5' 10\" (1.778 m) Physical Examination: General appearance - alert, well appearing, and in no distress Mental status - alert, oriented to person, place, and time Lymphatics - no palpable lymphadenopathy, no hepatosplenomegaly Chest - clear to auscultation, no wheezes, rales or rhonchi, symmetric air entry Heart - normal rate, regular rhythm, normal S1, S2, no murmurs, rubs, clicks or gallops Assessment/ Plan:  
Diagnoses and all orders for this visit: 
 
1. Unrefreshed by sleep 
-     SLEEP MEDICINE REFERRAL Symptoms of SENTHIL, getting her tested asap 2. Daytime somnolence 
-     SLEEP MEDICINE REFERRAL As above 3. Status post gastric bypass for obesity 
-     REFERRAL TO PSYCHOLOGY She has tried GBP and has regained the weight, she has tried a VLCD and was not compliant I suggested getting help from behaviorist to work with us Also try contrave 4. Obesity (BMI 30-39.9) 
-     REFERRAL TO PSYCHOLOGY 
-     naltrexone-buPROPion (CONTRAVE) 8-90 mg TbER ER tablet; Week 1 1 tab PO QAM, Week 2 1QAM 1QHS, Week 3 2QAM 1 QHS, Week 4 & beyond 2QAM 2QHS 5. Alopecia 
-     REFERRAL TO DERMATOLOGY 
-     clobetasol (TEMOVATE) 0.05 % external solution; Apply to scalp once daily The hair has regrown in some spots. I suggest cont the temovate and get eval in derm Follow-up Disposition: Not on File ICD-10-CM ICD-9-CM 1. Unrefreshed by sleep G47.8 780.59 SLEEP MEDICINE REFERRAL 2. Daytime somnolence R40.0 780.54 SLEEP MEDICINE REFERRAL 3. Status post gastric bypass for obesity Z98.84 V45.86 REFERRAL TO PSYCHOLOGY 4. Obesity (BMI 30-39. 9) E66.9 278.00 REFERRAL TO PSYCHOLOGY  
   naltrexone-buPROPion (CONTRAVE) 8-90 mg TbER ER tablet 5. Alopecia L65.9 704.00 REFERRAL TO DERMATOLOGY  
   clobetasol (TEMOVATE) 0.05 % external solution I have discussed the diagnosis with the patient and the intended plan as seen in the above orders. The patient has received an after-visit summary and questions were answered concerning future plans. Medication Side Effects and Warnings were discussed with patient: yes Patient Labs were reviewed and or requested: yes Patient Past Records were reviewed and or requested: yes There are no Patient Instructions on file for this visit. The patient verbalizes understanding and agrees with the plan of care Patient has the advanced directives booklet to review

## 2018-09-15 LAB
25(OH)D3+25(OH)D2 SERPL-MCNC: 28.2 NG/ML (ref 30–100)
ALBUMIN SERPL-MCNC: 4.5 G/DL (ref 3.5–5.5)
ALBUMIN/GLOB SERPL: 1.4 {RATIO} (ref 1.2–2.2)
ALP SERPL-CCNC: 164 IU/L (ref 39–117)
ALT SERPL-CCNC: 22 IU/L (ref 0–32)
AST SERPL-CCNC: 19 IU/L (ref 0–40)
BILIRUB SERPL-MCNC: 0.3 MG/DL (ref 0–1.2)
BUN SERPL-MCNC: 10 MG/DL (ref 6–24)
BUN/CREAT SERPL: 17 (ref 9–23)
CALCIUM SERPL-MCNC: 9.7 MG/DL (ref 8.7–10.2)
CHLORIDE SERPL-SCNC: 101 MMOL/L (ref 96–106)
CO2 SERPL-SCNC: 24 MMOL/L (ref 20–29)
CREAT SERPL-MCNC: 0.6 MG/DL (ref 0.57–1)
GLOBULIN SER CALC-MCNC: 3.3 G/DL (ref 1.5–4.5)
GLUCOSE SERPL-MCNC: 100 MG/DL (ref 65–99)
POTASSIUM SERPL-SCNC: 4.4 MMOL/L (ref 3.5–5.2)
PROT SERPL-MCNC: 7.8 G/DL (ref 6–8.5)
SODIUM SERPL-SCNC: 144 MMOL/L (ref 134–144)

## 2018-09-19 NOTE — PROGRESS NOTES
The vit D level is better but a little low still. Keep taking what you are taking The liver test is creeping up the last two months. This might be fatty liver developing. Exercise and getting back on the heart healthy low fat and low carb diet may correct it. More specific blood tests need to be done.  I will do that when you come back in

## 2018-09-20 NOTE — PROGRESS NOTES
Call placed to pt to advised of results but no call back received. Letter with lab results/recommendaitons sent to pt address on file.

## 2018-09-25 RX ORDER — ERGOCALCIFEROL 1.25 MG/1
50000 CAPSULE ORAL
Qty: 12 CAP | Refills: 0 | Status: SHIPPED | OUTPATIENT
Start: 2018-09-25 | End: 2019-08-22 | Stop reason: SDUPTHER

## 2018-11-27 ENCOUNTER — OFFICE VISIT (OUTPATIENT)
Dept: FAMILY MEDICINE CLINIC | Age: 53
End: 2018-11-27

## 2018-11-27 VITALS
TEMPERATURE: 97.8 F | RESPIRATION RATE: 16 BRPM | BODY MASS INDEX: 34.09 KG/M2 | HEART RATE: 67 BPM | DIASTOLIC BLOOD PRESSURE: 71 MMHG | OXYGEN SATURATION: 95 % | SYSTOLIC BLOOD PRESSURE: 105 MMHG | WEIGHT: 238.1 LBS | HEIGHT: 70 IN

## 2018-11-27 DIAGNOSIS — R53.83 FATIGUE, UNSPECIFIED TYPE: Primary | ICD-10-CM

## 2018-11-27 DIAGNOSIS — R40.0 DAYTIME SOMNOLENCE: ICD-10-CM

## 2018-11-27 NOTE — PROGRESS NOTES
Neil Nogueira is a 47 y.o. female who presents with the following complaints:  Chief Complaint   Patient presents with    Fatigue    Labs    Medication Refill       Subjective:    HPI:   C/o fatigue, daytime sleepiness. Reports two severe episodes at work after drinking coffee. 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 Of note, she complained of similar symptoms a few months ago and was referred for sleep medicine consult/sleep study. She has not scheduled this. She reports she has had severe anemia and vitamin deficiencies in the past and had similar symptoms as well. She is s/p gastric bypass. She had bariatric surgery f/u in , labs were all normal and routine vitamin supplements were recommended.      Pertinent PMH/FH/SH:  Past Medical History:   Diagnosis Date    Anemia     Hypertension     Menorrhagia      Past Surgical History:   Procedure Laterality Date    HX  SECTION      HX GASTRIC BYPASS      HX NEPHRECTOMY       Family History   Problem Relation Age of Onset    Hypertension Mother     Diabetes Mother     Cancer Mother         cervical    Heart Disease Mother     Prostate Cancer Father     Hypertension Father     Breast Cancer Paternal Aunt     Uterine Cancer Sister     Diabetes Sister      Social History     Socioeconomic History    Marital status:      Spouse name: Not on file    Number of children: Not on file    Years of education: Not on file    Highest education level: Not on file   Tobacco Use    Smoking status: Never Smoker    Smokeless tobacco: Never Used   Substance and Sexual Activity    Alcohol use: No    Drug use: No    Sexual activity: Not Currently     Partners: Male     Birth control/protection: Condom     Advanced Directives: N      Patient Active Problem List    Diagnosis    Weight gain, abnormal    Gastroesophageal reflux disease without esophagitis    Uterine leiomyoma    Essential hypertension    Status post gastric bypass for obesity    Cheloid scar       Nurse notes were reviewed and are correct  Review of Systems - negative except as listed above in the HPI    Objective:     Vitals:    11/27/18 1118   BP: 105/71   Pulse: 67   Resp: 16   Temp: 97.8 °F (36.6 °C)   TempSrc: Oral   SpO2: 95%   Weight: 238 lb 1.6 oz (108 kg)   Height: 5' 10\" (1.778 m)     Physical Examination: General appearance - alert, well appearing, and in no distress, oriented to person, place, and time, overweight and well hydrated  Mental status - anxious  Neck - supple, no significant adenopathy  Chest - clear to auscultation, no wheezes, rales or rhonchi, symmetric air entry  Heart - normal rate, regular rhythm, normal S1, S2, no murmurs, rubs, clicks or gallops, no JVD  Neurological - alert, oriented, normal speech, no focal findings or movement disorder noted  Extremities - no pedal edema noted  Skin - normal coloration and turgor, no rashes    Assessment/ Plan:   Diagnoses and all orders for this visit:    1. Fatigue, unspecified type  Recommend she consult base security or the police about her concerns with the possible tampering with the coffee  Check labs  Sleep medicine consult  -     TSH 3RD GENERATION  -     CBC WITH AUTOMATED DIFF  -     METABOLIC PANEL, COMPREHENSIVE  -     VITAMIN D, 25 HYDROXY  -     VITAMIN B12  -     SLEEP MEDICINE REFERRAL    2. Daytime somnolence  -     SLEEP MEDICINE REFERRAL       Follow-up Disposition: PRN    I have discussed the diagnosis with the patient and the intended plan as seen in the above orders. The patient has received an after-visit summary and questions were answered concerning future plans. The patient verbalizes understanding.     Medication Side Effects and Warnings were discussed with patient: yes  Patient Labs were reviewed and or requested: yes  Patient Past Records were reviewed and or requested: yes    Patient Instructions Fatigue: Care Instructions  Your Care Instructions    Fatigue is a feeling of tiredness, exhaustion, or lack of energy. You may feel fatigue because of too much or not enough activity. It can also come from stress, lack of sleep, boredom, and poor diet. Many medical problems, such as viral infections, can cause fatigue. Emotional problems, especially depression, are often the cause of fatigue. Fatigue is most often a symptom of another problem. Treatment for fatigue depends on the cause. For example, if you have fatigue because you have a certain health problem, treating this problem also treats your fatigue. If depression or anxiety is the cause, treatment may help. Follow-up care is a key part of your treatment and safety. Be sure to make and go to all appointments, and call your doctor if you are having problems. It's also a good idea to know your test results and keep a list of the medicines you take. How can you care for yourself at home? · Get regular exercise. But don't overdo it. Go back and forth between rest and exercise. · Get plenty of rest.  · Eat a healthy diet. Do not skip meals, especially breakfast.  · Reduce your use of caffeine, tobacco, and alcohol. Caffeine is most often found in coffee, tea, cola drinks, and chocolate. · Limit medicines that can cause fatigue. This includes tranquilizers and cold and allergy medicines. When should you call for help? Watch closely for changes in your health, and be sure to contact your doctor if:    · You have new symptoms such as fever or a rash.     · Your fatigue gets worse.     · You have been feeling down, depressed, or hopeless. Or you may have lost interest in things that you usually enjoy.     · You are not getting better as expected. Where can you learn more? Go to http://zeyad-cecile.info/. Enter N727 in the search box to learn more about \"Fatigue: Care Instructions. \"  Current as of: November 20, 2017  Content Version: 11.8  © 2485-7031 [employer]. Care instructions adapted under license by [employer] (which disclaims liability or warranty for this information). If you have questions about a medical condition or this instruction, always ask your healthcare professional. [address] any warranty or liability for your use of this information.             Yessy BHATT

## 2018-11-27 NOTE — PROGRESS NOTES
1. Have you been to the ER, urgent care clinic since your last visit? Hospitalized since your last visit? No    2. Have you seen or consulted any other health care providers outside of the [address] [place] [address] since your last visit? Include any pap smears or colon screening.  No     Chief Complaint   Patient presents with    Fatigue    Labs    Medication Refill

## 2018-11-27 NOTE — PATIENT INSTRUCTIONS
Fatigue: Care Instructions  Your Care Instructions    Fatigue is a feeling of tiredness, exhaustion, or lack of energy. You may feel fatigue because of too much or not enough activity. It can also come from stress, lack of sleep, boredom, and poor diet. Many medical problems, such as viral infections, can cause fatigue. Emotional problems, especially depression, are often the cause of fatigue. Fatigue is most often a symptom of another problem. Treatment for fatigue depends on the cause. For example, if you have fatigue because you have a certain health problem, treating this problem also treats your fatigue. If depression or anxiety is the cause, treatment may help. Follow-up care is a key part of your treatment and safety. Be sure to make and go to all appointments, and call your doctor if you are having problems. It's also a good idea to know your test results and keep a list of the medicines you take. How can you care for yourself at home? · Get regular exercise. But don't overdo it. Go back and forth between rest and exercise. · Get plenty of rest.  · Eat a healthy diet. Do not skip meals, especially breakfast.  · Reduce your use of caffeine, tobacco, and alcohol. Caffeine is most often found in coffee, tea, cola drinks, and chocolate. · Limit medicines that can cause fatigue. This includes tranquilizers and cold and allergy medicines. When should you call for help? Watch closely for changes in your health, and be sure to contact your doctor if:    · You have new symptoms such as fever or a rash.     · Your fatigue gets worse.     · You have been feeling down, depressed, or hopeless. Or you may have lost interest in things that you usually enjoy.     · You are not getting better as expected. Where can you learn more? Go to http://zeyad-cecile.info/. Enter U760 in the search box to learn more about \"Fatigue: Care Instructions. \"  Current as of: November 20, 2017  Content Version: 11.8  © 0143-4362 [employer]wise, Incorporated. Care instructions adapted under license by [employer] (which disclaims liability or warranty for this information). If you have questions about a medical condition or this instruction, always ask your healthcare professional. [address] any warranty or liability for your use of this information.

## 2018-11-28 LAB
25(OH)D3+25(OH)D2 SERPL-MCNC: 29.9 NG/ML (ref 30–100)
ALBUMIN SERPL-MCNC: 4.5 G/DL (ref 3.5–5.5)
ALBUMIN/GLOB SERPL: 1.6 {RATIO} (ref 1.2–2.2)
ALP SERPL-CCNC: 133 IU/L (ref 39–117)
ALT SERPL-CCNC: 46 IU/L (ref 0–32)
AST SERPL-CCNC: 26 IU/L (ref 0–40)
BASOPHILS # BLD AUTO: 0 X10E3/UL (ref 0–0.2)
BASOPHILS NFR BLD AUTO: 1 %
BILIRUB SERPL-MCNC: 0.4 MG/DL (ref 0–1.2)
BUN SERPL-MCNC: 11 MG/DL (ref 6–24)
BUN/CREAT SERPL: 17 (ref 9–23)
CALCIUM SERPL-MCNC: 10 MG/DL (ref 8.7–10.2)
CHLORIDE SERPL-SCNC: 101 MMOL/L (ref 96–106)
CO2 SERPL-SCNC: 29 MMOL/L (ref 20–29)
CREAT SERPL-MCNC: 0.65 MG/DL (ref 0.57–1)
EOSINOPHIL # BLD AUTO: 0.1 X10E3/UL (ref 0–0.4)
EOSINOPHIL NFR BLD AUTO: 2 %
ERYTHROCYTE [DISTWIDTH] IN BLOOD BY AUTOMATED COUNT: 15.9 % (ref 12.3–15.4)
GLOBULIN SER CALC-MCNC: 2.9 G/DL (ref 1.5–4.5)
GLUCOSE SERPL-MCNC: 97 MG/DL (ref 65–99)
HCT VFR BLD AUTO: 39.5 % (ref 34–46.6)
HGB BLD-MCNC: 13.1 G/DL (ref 11.1–15.9)
IMM GRANULOCYTES # BLD: 0 X10E3/UL (ref 0–0.1)
IMM GRANULOCYTES NFR BLD: 0 %
LYMPHOCYTES # BLD AUTO: 1.6 X10E3/UL (ref 0.7–3.1)
LYMPHOCYTES NFR BLD AUTO: 26 %
MCH RBC QN AUTO: 27.6 PG (ref 26.6–33)
MCHC RBC AUTO-ENTMCNC: 33.2 G/DL (ref 31.5–35.7)
MCV RBC AUTO: 83 FL (ref 79–97)
MONOCYTES # BLD AUTO: 0.4 X10E3/UL (ref 0.1–0.9)
MONOCYTES NFR BLD AUTO: 6 %
NEUTROPHILS # BLD AUTO: 4 X10E3/UL (ref 1.4–7)
NEUTROPHILS NFR BLD AUTO: 65 %
PLATELET # BLD AUTO: 320 X10E3/UL (ref 150–379)
POTASSIUM SERPL-SCNC: 4.3 MMOL/L (ref 3.5–5.2)
PROT SERPL-MCNC: 7.4 G/DL (ref 6–8.5)
RBC # BLD AUTO: 4.75 X10E6/UL (ref 3.77–5.28)
SODIUM SERPL-SCNC: 143 MMOL/L (ref 134–144)
TSH SERPL DL<=0.005 MIU/L-ACNC: 1.09 UIU/ML (ref 0.45–4.5)
VIT B12 SERPL-MCNC: 265 PG/ML (ref 232–1245)
WBC # BLD AUTO: 6.2 X10E3/UL (ref 3.4–10.8)

## 2018-11-28 NOTE — PROGRESS NOTES
Thyroid is normal.   There is no anemia. Alk phos is elevated but less so than it has been in the past.   ALT is just above normal. Recommend abstaining from alcohol and rechecking in a few weeks, but it is nothing to be alarmed about and is not the cause of your symptoms. Vit D is just below normal- recommend daily supplement of 1000 international units. b12 levels are normal.   I don't see any cause for the fatigue in these tests- recommend you schedule sleep medicine consult as next step in looking for a cause.

## 2019-04-01 ENCOUNTER — OFFICE VISIT (OUTPATIENT)
Dept: FAMILY MEDICINE CLINIC | Age: 54
End: 2019-04-01

## 2019-04-01 VITALS
RESPIRATION RATE: 18 BRPM | OXYGEN SATURATION: 95 % | HEART RATE: 88 BPM | DIASTOLIC BLOOD PRESSURE: 75 MMHG | BODY MASS INDEX: 30.78 KG/M2 | HEIGHT: 70 IN | TEMPERATURE: 98 F | SYSTOLIC BLOOD PRESSURE: 113 MMHG | WEIGHT: 215 LBS

## 2019-04-01 DIAGNOSIS — M54.50 ACUTE MIDLINE LOW BACK PAIN WITHOUT SCIATICA: ICD-10-CM

## 2019-04-01 DIAGNOSIS — K90.9 INTESTINAL MALABSORPTION, UNSPECIFIED TYPE: ICD-10-CM

## 2019-04-01 DIAGNOSIS — I10 ESSENTIAL HYPERTENSION: ICD-10-CM

## 2019-04-01 DIAGNOSIS — Z98.84 STATUS POST GASTRIC BYPASS FOR OBESITY: ICD-10-CM

## 2019-04-01 DIAGNOSIS — R53.83 FATIGUE, UNSPECIFIED TYPE: Primary | ICD-10-CM

## 2019-04-01 NOTE — PATIENT INSTRUCTIONS
Learning About Relief for Back Pain  What is back tension and strain? Back strain happens when you overstretch, or pull, a muscle in your back. You may hurt your back in an accident or when you exercise or lift something. Most back pain will get better with rest and time. You can take care of yourself at home to help your back heal.  What can you do first to relieve back pain? When you first feel back pain, try these steps:  · Walk. Take a short walk (10 to 20 minutes) on a level surface (no slopes, hills, or stairs) every 2 to 3 hours. Walk only distances you can manage without pain, especially leg pain. · Relax. Find a comfortable position for rest. Some people are comfortable on the floor or a medium-firm bed with a small pillow under their head and another under their knees. Some people prefer to lie on their side with a pillow between their knees. Don't stay in one position for too long. · Try heat or ice. Try using a heating pad on a low or medium setting, or take a warm shower, for 15 to 20 minutes every 2 to 3 hours. Or you can buy single-use heat wraps that last up to 8 hours. You can also try an ice pack for 10 to 15 minutes every 2 to 3 hours. You can use an ice pack or a bag of frozen vegetables wrapped in a thin towel. There is not strong evidence that either heat or ice will help, but you can try them to see if they help. You may also want to try switching between heat and cold. · Take pain medicine exactly as directed. ? If the doctor gave you a prescription medicine for pain, take it as prescribed. ? If you are not taking a prescription pain medicine, ask your doctor if you can take an over-the-counter medicine. What else can you do? · Stretch and exercise. Exercises that increase flexibility may relieve your pain and make it easier for your muscles to keep your spine in a good, neutral position. And don't forget to keep walking. · Do self-massage.  You can use self-massage to unwind after work or school or to energize yourself in the morning. You can easily massage your feet, hands, or neck. Self-massage works best if you are in comfortable clothes and are sitting or lying in a comfortable position. Use oil or lotion to massage bare skin. · Reduce stress. Back pain can lead to a vicious [place]: Distress about the pain tenses the muscles in your back, which in turn causes more pain. Learn how to relax your mind and your muscles to lower your stress. Where can you learn more? Go to http://zeyad-cceile.info/. Enter C191 in the search box to learn more about \"Learning About Relief for Back Pain. \"  Current as of: September 20, 2018  Content Version: 11.9  © 0916-3010 [employer]. Care instructions adapted under license by [employer] (which disclaims liability or warranty for this information). If you have questions about a medical condition or this instruction, always ask your healthcare professional. [place] [address] any warranty or liability for your use of this information. Fatigue: Care Instructions  Your Care Instructions    Fatigue is a feeling of tiredness, exhaustion, or lack of energy. You may feel fatigue because of too much or not enough activity. It can also come from stress, lack of sleep, boredom, and poor diet. Many medical problems, such as viral infections, can cause fatigue. Emotional problems, especially depression, are often the cause of fatigue. Fatigue is most often a symptom of another problem. Treatment for fatigue depends on the cause. For example, if you have fatigue because you have a certain health problem, treating this problem also treats your fatigue. If depression or anxiety is the cause, treatment may help. Follow-up care is a key part of your treatment and safety. Be sure to make and go to all appointments, and call your doctor if you are having problems.  It's also a good idea to know your test results and keep a list of the medicines you take. How can you care for yourself at home? · Get regular exercise. But don't overdo it. Go back and forth between rest and exercise. · Get plenty of rest.  · Eat a healthy diet. Do not skip meals, especially breakfast.  · Reduce your use of caffeine, tobacco, and alcohol. Caffeine is most often found in coffee, tea, cola drinks, and chocolate. · Limit medicines that can cause fatigue. This includes tranquilizers and cold and allergy medicines. When should you call for help? Watch closely for changes in your health, and be sure to contact your doctor if:    · You have new symptoms such as fever or a rash.     · Your fatigue gets worse.     · You have been feeling down, depressed, or hopeless. Or you may have lost interest in things that you usually enjoy.     · You are not getting better as expected. Where can you learn more? Go to http://zeyad-cecile.info/. Enter D133 in the search box to learn more about \"Fatigue: Care Instructions. \"  Current as of: September 23, 2018  Content Version: 11.9  © 3600-6711 [employer]. Care instructions adapted under license by [employer] (which disclaims liability or warranty for this information). If you have questions about a medical condition or this instruction, always ask your healthcare professional. [address] any warranty or liability for your use of this information.

## 2019-04-01 NOTE — PROGRESS NOTES
1. Have you been to the ER, urgent care clinic since your last visit? Hospitalized since your last visit? No    2. Have you seen or consulted any other health care providers outside of the [facility] since your last visit? Include any pap smears or colon screening. No  Chief Complaint   Patient presents with    Labs    Tailbone Pain     Patient is feeling really sluggish. She would like to have her iron and vitamin D level checked. Patient stated lower back pain started today and increasing in intensity.

## 2019-04-02 LAB
25(OH)D3+25(OH)D2 SERPL-MCNC: 23.6 NG/ML (ref 30–100)
ALBUMIN SERPL-MCNC: 4.1 G/DL (ref 3.5–5.5)
ALBUMIN/GLOB SERPL: 1.3 {RATIO} (ref 1.2–2.2)
ALP SERPL-CCNC: 134 IU/L (ref 39–117)
ALT SERPL-CCNC: 17 IU/L (ref 0–32)
AST SERPL-CCNC: 16 IU/L (ref 0–40)
BASOPHILS # BLD AUTO: 0 X10E3/UL (ref 0–0.2)
BASOPHILS NFR BLD AUTO: 0 %
BILIRUB SERPL-MCNC: 0.3 MG/DL (ref 0–1.2)
BUN SERPL-MCNC: 17 MG/DL (ref 6–24)
BUN/CREAT SERPL: 31 (ref 9–23)
CALCIUM SERPL-MCNC: 9.2 MG/DL (ref 8.7–10.2)
CHLORIDE SERPL-SCNC: 101 MMOL/L (ref 96–106)
CO2 SERPL-SCNC: 23 MMOL/L (ref 20–29)
CREAT SERPL-MCNC: 0.55 MG/DL (ref 0.57–1)
EOSINOPHIL # BLD AUTO: 0.1 X10E3/UL (ref 0–0.4)
EOSINOPHIL NFR BLD AUTO: 1 %
ERYTHROCYTE [DISTWIDTH] IN BLOOD BY AUTOMATED COUNT: 14.6 % (ref 12.3–15.4)
GLOBULIN SER CALC-MCNC: 3.1 G/DL (ref 1.5–4.5)
GLUCOSE SERPL-MCNC: 75 MG/DL (ref 65–99)
HCT VFR BLD AUTO: 39.3 % (ref 34–46.6)
HGB BLD-MCNC: 12.8 G/DL (ref 11.1–15.9)
IMM GRANULOCYTES # BLD AUTO: 0 X10E3/UL (ref 0–0.1)
IMM GRANULOCYTES NFR BLD AUTO: 0 %
LYMPHOCYTES # BLD AUTO: 1.4 X10E3/UL (ref 0.7–3.1)
LYMPHOCYTES NFR BLD AUTO: 21 %
MCH RBC QN AUTO: 27.7 PG (ref 26.6–33)
MCHC RBC AUTO-ENTMCNC: 32.6 G/DL (ref 31.5–35.7)
MCV RBC AUTO: 85 FL (ref 79–97)
MONOCYTES # BLD AUTO: 0.4 X10E3/UL (ref 0.1–0.9)
MONOCYTES NFR BLD AUTO: 6 %
NEUTROPHILS # BLD AUTO: 4.8 X10E3/UL (ref 1.4–7)
NEUTROPHILS NFR BLD AUTO: 72 %
PLATELET # BLD AUTO: 335 X10E3/UL (ref 150–379)
POTASSIUM SERPL-SCNC: 4.5 MMOL/L (ref 3.5–5.2)
PROT SERPL-MCNC: 7.2 G/DL (ref 6–8.5)
RBC # BLD AUTO: 4.62 X10E6/UL (ref 3.77–5.28)
SODIUM SERPL-SCNC: 142 MMOL/L (ref 134–144)
TSH SERPL DL<=0.005 MIU/L-ACNC: 1.19 UIU/ML (ref 0.45–4.5)
VIT B12 SERPL-MCNC: 182 PG/ML (ref 232–1245)
WBC # BLD AUTO: 6.7 X10E3/UL (ref 3.4–10.8)

## 2019-04-04 NOTE — PROGRESS NOTES
No anemia noted  b12 low, recommend daily supplement of 500 mcg. Vit D low, recommend 1000 international units daily. These OTC supplements, along with a multivitamin, should be lifelong daily supplements since you have had a gastric bypass surgery. I also want to encourage you to schedule sleep medicine consult to further investigate this as a cause of your fatigue.

## 2019-04-07 NOTE — PROGRESS NOTES
Wilma Owusu is a 47 y.o. female who presents with the following complaints:  Chief Complaint   Patient presents with    Labs    Tailbone Pain       Subjective:    HPI:   Returns today complaining of continued fatigue, low energy, poor sleep. She has hx of gastric bypass outside of the local area. She had bariatric f/u visit with surgeon here in  and 2018, but finds this inconvenient and does not plan to return. She has not been taking recommended OTC bariatric supplements. She states that \"we\" keep discontinuing her prescription supplements and this has lead to anemia. She presents today insisting on having labs as she is convinced that her symptoms are due to recurrent anemia. She has not scheduled sleep consult as recommended for her symptoms by Dr. Bhumika Muniz in 2018, and by myself in 2017 and 2018, states she just hasn't gotten around to scheduling yet. C/o low back pain, bilateral, mild, onset earlier today. No LE symptoms. Has tried no OTC pain relievers or other treatments.     Pertinent PMH/FH/SH:  Past Medical History:   Diagnosis Date    Anemia     Hypertension     Menorrhagia      Past Surgical History:   Procedure Laterality Date    HX  SECTION      HX GASTRIC BYPASS      HX NEPHRECTOMY       Family History   Problem Relation Age of Onset    Hypertension Mother     Diabetes Mother     Cancer Mother         cervical    Heart Disease Mother     Prostate Cancer Father     Hypertension Father     Breast Cancer Paternal Aunt     Uterine Cancer Sister     Diabetes Sister      Social History     Socioeconomic History    Marital status:      Spouse name: Not on file    Number of children: Not on file    Years of education: Not on file    Highest education level: Not on file   Tobacco Use    Smoking status: Never Smoker    Smokeless tobacco: Never Used   Substance and Sexual Activity    Alcohol use: No    Drug use: No    Sexual activity: Not Currently     Partners: Male     Birth control/protection: Condom     Advanced Directives: N      Patient Active Problem List    Diagnosis    Weight gain, abnormal    Gastroesophageal reflux disease without esophagitis    Uterine leiomyoma    Essential hypertension    Status post gastric bypass for obesity    Cheloid scar       Nurse notes were reviewed and are correct  Review of Systems - negative except as listed above in the HPI    Objective:     Vitals:    04/01/19 1614   BP: 113/75   Pulse: 88   Resp: 18   Temp: 98 °F (36.7 °C)   TempSrc: Oral   SpO2: 95%   Weight: 215 lb (97.5 kg)   Height: 5' 10\" (1.778 m)     Physical Examination: General appearance - alert, well appearing, and in no distress, oriented to person, place, and time and well hydrated  Mental status - normal dress, motor activity, and thought processes; argumentative  Eyes - sclera anicteric  Neck - supple, no significant adenopathy, thyroid exam: thyroid is normal in size without nodules or tenderness  Chest - clear to auscultation, no wheezes, rales or rhonchi, symmetric air entry  Heart - normal rate, regular rhythm, normal S1, S2, no murmurs, rubs, clicks or gallops  Abdomen - soft, nontender, nondistended, no masses or organomegaly  bowel sounds normal  Neurological - alert, oriented, normal speech, no focal findings or movement disorder noted  Extremities - no pedal edema noted  Skin - normal coloration and turgor, no rashes, no suspicious skin lesions noted    Assessment/ Plan:   Diagnoses and all orders for this visit:    1. Fatigue, unspecified type  Check labs  Strongly encouraged to schedule sleep medicine eval  -     CBC WITH AUTOMATED DIFF  -     VITAMIN B12  -     VITAMIN D, 25 HYDROXY  -     TSH 3RD GENERATION  -     METABOLIC PANEL, COMPREHENSIVE    2. Essential hypertension  At goal  Continue rx  DASH diet  Daily walking or other exercise  Weight loss    3. Intestinal malabsorption, unspecified type  4.  Status post gastric bypass for obesity  Recommend restarting OTC bariatric vitamin supplements. I don't see any evidence of any of these supplements being discontinued at the direction of a provider at our clinic, these should be lifelong supplements  -     CBC WITH AUTOMATED DIFF  -     VITAMIN B12  -     VITAMIN D, 25 HYDROXY        5. Acute midline low back pain without sciatica  Tylenol prn  Heat tid     Follow-up and Dispositions    · Return in about 1 month (around 4/29/2019) for f/u with Dr. Susan Gastelum. Patient was condescending, disrespectful, and argumentative today during her visit with me. I have recommended she schedule future follow-ups with Dr. Susan Gastelum, she is agreeable with this plan. I have discussed the diagnosis with the patient and the intended plan as seen in the above orders. The patient has received an after-visit summary and questions were answered concerning future plans. The patient verbalizes understanding. Medication Side Effects and Warnings were discussed with patient: yes  Patient Labs were reviewed and or requested: yes  Patient Past Records were reviewed and or requested: yes    Patient Instructions          Learning About Relief for Back Pain  What is back tension and strain? Back strain happens when you overstretch, or pull, a muscle in your back. You may hurt your back in an accident or when you exercise or lift something. Most back pain will get better with rest and time. You can take care of yourself at home to help your back heal.  What can you do first to relieve back pain? When you first feel back pain, try these steps:  · Walk. Take a short walk (10 to 20 minutes) on a level surface (no slopes, hills, or stairs) every 2 to 3 hours. Walk only distances you can manage without pain, especially leg pain. · Relax.  Find a comfortable position for rest. Some people are comfortable on the floor or a medium-firm bed with a small pillow under their head and another under their knees. Some people prefer to lie on their side with a pillow between their knees. Don't stay in one position for too long. · Try heat or ice. Try using a heating pad on a low or medium setting, or take a warm shower, for 15 to 20 minutes every 2 to 3 hours. Or you can buy single-use heat wraps that last up to 8 hours. You can also try an ice pack for 10 to 15 minutes every 2 to 3 hours. You can use an ice pack or a bag of frozen vegetables wrapped in a thin towel. There is not strong evidence that either heat or ice will help, but you can try them to see if they help. You may also want to try switching between heat and cold. · Take pain medicine exactly as directed. ? If the doctor gave you a prescription medicine for pain, take it as prescribed. ? If you are not taking a prescription pain medicine, ask your doctor if you can take an over-the-counter medicine. What else can you do? · Stretch and exercise. Exercises that increase flexibility may relieve your pain and make it easier for your muscles to keep your spine in a good, neutral position. And don't forget to keep walking. · Do self-massage. You can use self-massage to unwind after work or school or to energize yourself in the morning. You can easily massage your feet, hands, or neck. Self-massage works best if you are in comfortable clothes and are sitting or lying in a comfortable position. Use oil or lotion to massage bare skin. · Reduce stress. Back pain can lead to a vicious [place]: Distress about the pain tenses the muscles in your back, which in turn causes more pain. Learn how to relax your mind and your muscles to lower your stress. Where can you learn more? Go to http://zeyad-cecile.info/. Enter U046 in the search box to learn more about \"Learning About Relief for Back Pain. \"  Current as of: September 20, 2018  Content Version: 11.9  © 1394-5115 [employer].  Care instructions adapted under license by [facility] (which disclaims liability or warranty for this information). If you have questions about a medical condition or this instruction, always ask your healthcare professional. [address] any warranty or liability for your use of this information. Fatigue: Care Instructions  Your Care Instructions    Fatigue is a feeling of tiredness, exhaustion, or lack of energy. You may feel fatigue because of too much or not enough activity. It can also come from stress, lack of sleep, boredom, and poor diet. Many medical problems, such as viral infections, can cause fatigue. Emotional problems, especially depression, are often the cause of fatigue. Fatigue is most often a symptom of another problem. Treatment for fatigue depends on the cause. For example, if you have fatigue because you have a certain health problem, treating this problem also treats your fatigue. If depression or anxiety is the cause, treatment may help. Follow-up care is a key part of your treatment and safety. Be sure to make and go to all appointments, and call your doctor if you are having problems. It's also a good idea to know your test results and keep a list of the medicines you take. How can you care for yourself at home? · Get regular exercise. But don't overdo it. Go back and forth between rest and exercise. · Get plenty of rest.  · Eat a healthy diet. Do not skip meals, especially breakfast.  · Reduce your use of caffeine, tobacco, and alcohol. Caffeine is most often found in coffee, tea, cola drinks, and chocolate. · Limit medicines that can cause fatigue. This includes tranquilizers and cold and allergy medicines. When should you call for help? Watch closely for changes in your health, and be sure to contact your doctor if:    · You have new symptoms such as fever or a rash.     · Your fatigue gets worse.     · You have been feeling down, depressed, or hopeless.  Or you may have lost interest in things that you usually enjoy.     · You are not getting better as expected. Where can you learn more? Go to http://zeyad-cecile.info/. Enter S674 in the search box to learn more about \"Fatigue: Care Instructions. \"  Current as of: September 23, 2018  Content Version: 11.9  © 4203-4863 [employer]. Care instructions adapted under license by [employer] (which disclaims liability or warranty for this information). If you have questions about a medical condition or this instruction, always ask your healthcare professional. [address] any warranty or liability for your use of this information.             Lauren BHATT

## 2019-08-22 ENCOUNTER — OFFICE VISIT (OUTPATIENT)
Dept: FAMILY MEDICINE CLINIC | Age: 54
End: 2019-08-22

## 2019-08-22 VITALS
SYSTOLIC BLOOD PRESSURE: 101 MMHG | DIASTOLIC BLOOD PRESSURE: 65 MMHG | HEIGHT: 70 IN | RESPIRATION RATE: 18 BRPM | HEART RATE: 79 BPM | BODY MASS INDEX: 28.49 KG/M2 | TEMPERATURE: 98.1 F | OXYGEN SATURATION: 97 % | WEIGHT: 199 LBS

## 2019-08-22 DIAGNOSIS — D17.9 LIPOMA, UNSPECIFIED SITE: Primary | ICD-10-CM

## 2019-08-22 DIAGNOSIS — E53.8 VITAMIN B 12 DEFICIENCY: ICD-10-CM

## 2019-08-22 DIAGNOSIS — I10 ESSENTIAL HYPERTENSION: ICD-10-CM

## 2019-08-22 DIAGNOSIS — E55.9 VITAMIN D DEFICIENCY: ICD-10-CM

## 2019-08-22 RX ORDER — ERGOCALCIFEROL 1.25 MG/1
50000 CAPSULE ORAL
Qty: 12 CAP | Refills: 0 | Status: SHIPPED | OUTPATIENT
Start: 2019-08-22 | End: 2020-03-05 | Stop reason: SDUPTHER

## 2019-08-22 RX ORDER — TRIAMTERENE AND HYDROCHLOROTHIAZIDE 75; 50 MG/1; MG/1
1 TABLET ORAL DAILY
Qty: 90 TAB | Refills: 3 | Status: SHIPPED | OUTPATIENT
Start: 2019-08-22 | End: 2020-03-02 | Stop reason: SDUPTHER

## 2019-08-22 RX ORDER — CYANOCOBALAMIN 1000 UG/ML
1000 INJECTION, SOLUTION INTRAMUSCULAR; SUBCUTANEOUS ONCE
Qty: 1 VIAL | Refills: 0
Start: 2019-08-22 | End: 2019-08-22

## 2019-08-22 NOTE — PROGRESS NOTES
1. Have you been to the ER, urgent care clinic since your last visit? Hospitalized since your last visit? No    2. Have you seen or consulted any other health care providers outside of the [address] [place] [address] since your last visit? Include any pap smears or colon screening. No     Chief Complaint   Patient presents with    Cyst     posterior lower back.

## 2019-08-22 NOTE — PROGRESS NOTES
Chief Complaint   Patient presents with    Cyst     posterior lower back. she is a 49y.o. year old female who presents for evalution. She has a mass on the left lower back  She has noted it has gotten larger over the past year. No pain. No fever    She is post GBP, the last vit B12 was low but never got any supplement        Reviewed PmHx, RxHx, FmHx, SocHx, AllgHx and updated and dated in the chart. Aspirin yes ____   No____ N/A____    Patient Active Problem List    Diagnosis    Weight gain, abnormal    Gastroesophageal reflux disease without esophagitis    Uterine leiomyoma    Essential hypertension    Status post gastric bypass for obesity    Cheloid scar       Nurse notes were reviewed and copied and are correct  Review of Systems - negative except as listed above in the HPI    Objective:     Vitals:    08/22/19 0901   BP: 101/65   Pulse: 79   Resp: 18   Temp: 98.1 °F (36.7 °C)   TempSrc: Oral   SpO2: 97%   Weight: 199 lb (90.3 kg)   Height: 5' 10\" (1.778 m)       Physical Examination: General appearance - alert, well appearing, and in no distress  Mental status - alert, oriented to person, place, and time  Skin - mobile fatty mass on the left lower back      Assessment/ Plan:   Diagnoses and all orders for this visit:    1. Lipoma, unspecified site  -     REFERRAL TO GENERAL SURGERY    2. Essential hypertension  -     triamterene-hydroCHLOROthiazide (MAXZIDE) 75-50 mg per tablet; Take 1 Tab by mouth daily.  -     METABOLIC PANEL, COMPREHENSIVE    3. Vitamin D deficiency  -     ergocalciferol (ERGOCALCIFEROL) 50,000 unit capsule; Take 1 Cap by mouth every seven (7) days. 4. Vitamin B 12 deficiency  -     VITAMIN B12 INJECTION  -     LA THER/PROPH/DIAG INJECTION, SUBCUT/IM    Other orders  -     cyanocobalamin (VITAMIN B12) 1,000 mcg/mL injection; 1 mL by IntraMUSCular route once for 1 dose. Follow-up and Dispositions    · Return in about 3 months (around 11/22/2019).          ICD-10-CM ICD-9-CM    1. Lipoma, unspecified site D17.9 214.9 REFERRAL TO GENERAL SURGERY   2. Essential hypertension I10 401.9 triamterene-hydroCHLOROthiazide (MAXZIDE) 75-50 mg per tablet      METABOLIC PANEL, COMPREHENSIVE   3. Vitamin D deficiency E55.9 268.9 ergocalciferol (ERGOCALCIFEROL) 50,000 unit capsule   4. Vitamin B 12 deficiency E53.8 266.2 VITAMIN B12 INJECTION      DC THER/PROPH/DIAG INJECTION, SUBCUT/IM       I have discussed the diagnosis with the patient and the intended plan as seen in the above orders. The patient has received an after-visit summary and questions were answered concerning future plans. Medication Side Effects and Warnings were discussed with patient: yes  Patient Labs were reviewed and or requested: yes  Patient Past Records were reviewed and or requested: yes        There are no Patient Instructions on file for this visit.     The patient verbalizes understanding and agrees with the plan of care        Patient has the advanced directives booklet to review

## 2019-08-23 LAB
ALBUMIN SERPL-MCNC: 4.6 G/DL (ref 3.5–5.5)
ALBUMIN/GLOB SERPL: 1.5 {RATIO} (ref 1.2–2.2)
ALP SERPL-CCNC: 118 IU/L (ref 39–117)
ALT SERPL-CCNC: 38 IU/L (ref 0–32)
AST SERPL-CCNC: 29 IU/L (ref 0–40)
BILIRUB SERPL-MCNC: 0.4 MG/DL (ref 0–1.2)
BUN SERPL-MCNC: 14 MG/DL (ref 6–24)
BUN/CREAT SERPL: 24 (ref 9–23)
CALCIUM SERPL-MCNC: 9.9 MG/DL (ref 8.7–10.2)
CHLORIDE SERPL-SCNC: 99 MMOL/L (ref 96–106)
CO2 SERPL-SCNC: 27 MMOL/L (ref 20–29)
CREAT SERPL-MCNC: 0.59 MG/DL (ref 0.57–1)
GLOBULIN SER CALC-MCNC: 3.1 G/DL (ref 1.5–4.5)
GLUCOSE SERPL-MCNC: 89 MG/DL (ref 65–99)
POTASSIUM SERPL-SCNC: 3.8 MMOL/L (ref 3.5–5.2)
PROT SERPL-MCNC: 7.7 G/DL (ref 6–8.5)
SODIUM SERPL-SCNC: 141 MMOL/L (ref 134–144)

## 2019-08-28 NOTE — PROGRESS NOTES
The liver is still abnormal. Keep working on getting the weight down , avoiding sweets and starches and exercise 4-5 days a week.  I want to recheck in 3-4 weeks

## 2019-08-30 NOTE — PROGRESS NOTES
Spoke with pt advised of lab results/recommendations. Pt verbalized understanding and scheduled for 3 weeks. No further questions.

## 2019-10-07 ENCOUNTER — OFFICE VISIT (OUTPATIENT)
Dept: FAMILY MEDICINE CLINIC | Age: 54
End: 2019-10-07

## 2019-10-07 VITALS
SYSTOLIC BLOOD PRESSURE: 99 MMHG | TEMPERATURE: 98.1 F | DIASTOLIC BLOOD PRESSURE: 65 MMHG | HEIGHT: 70 IN | HEART RATE: 69 BPM | WEIGHT: 197 LBS | BODY MASS INDEX: 28.2 KG/M2 | RESPIRATION RATE: 18 BRPM | OXYGEN SATURATION: 99 %

## 2019-10-07 DIAGNOSIS — E53.8 VITAMIN B12 DEFICIENCY: ICD-10-CM

## 2019-10-07 DIAGNOSIS — Z23 ENCOUNTER FOR IMMUNIZATION: ICD-10-CM

## 2019-10-07 DIAGNOSIS — I10 ESSENTIAL HYPERTENSION: ICD-10-CM

## 2019-10-07 DIAGNOSIS — E53.8 VITAMIN B 12 DEFICIENCY: ICD-10-CM

## 2019-10-07 DIAGNOSIS — R79.89 ABNORMAL LIVER FUNCTION TEST: ICD-10-CM

## 2019-10-07 DIAGNOSIS — E55.9 VITAMIN D DEFICIENCY: ICD-10-CM

## 2019-10-07 DIAGNOSIS — Z12.11 SCREEN FOR COLON CANCER: ICD-10-CM

## 2019-10-07 DIAGNOSIS — R10.13 EPIGASTRIC PAIN: Primary | ICD-10-CM

## 2019-10-07 DIAGNOSIS — Z98.84 STATUS POST GASTRIC BYPASS FOR OBESITY: ICD-10-CM

## 2019-10-07 DIAGNOSIS — Z12.39 SCREENING FOR BREAST CANCER: ICD-10-CM

## 2019-10-07 RX ORDER — PHENOL/SODIUM PHENOLATE
20 AEROSOL, SPRAY (ML) MUCOUS MEMBRANE DAILY
COMMUNITY
End: 2022-05-19 | Stop reason: ALTCHOICE

## 2019-10-07 RX ORDER — MAGNESIUM CITRATE
296 SOLUTION, ORAL ORAL
COMMUNITY
End: 2022-05-19

## 2019-10-07 RX ORDER — LIDOCAINE HYDROCHLORIDE 20 MG/ML
15 SOLUTION OROPHARYNGEAL AS NEEDED
COMMUNITY
End: 2019-11-13

## 2019-10-07 NOTE — PROGRESS NOTES
1. Have you been to the ER, urgent care clinic since your last visit? Hospitalized since your last visit? [facility] ER for indigestion symptoms    2. Have you seen or consulted any other health care providers outside of the [address] [place] [address] since your last visit? Include any pap smears or colon screening.  No      Chief Complaint   Patient presents with    Fatigue    Indigestion     intermittent    Abnormal Lab Results     Liver function

## 2019-10-07 NOTE — PROGRESS NOTES
Chief Complaint   Patient presents with    Fatigue    Indigestion     intermittent    Abnormal Lab Results     Liver function     she is a 49y.o. year old female who presents for evalution. Had abd pain and gas and bloating last week  She went to ER Thursday  They thought she might have an ulcer  She was referred to GI  She is taking med for acid  She is feeling better now- started getting better ysterday  She is status post GBP, denies taking an  Reviewed PmHx, RxHx, FmHx, SocHx, AllgHx and updated and dated in the chart. Aspirin yes ____   No____ N/A____    Patient Active Problem List    Diagnosis    Weight gain, abnormal    Gastroesophageal reflux disease without esophagitis    Uterine leiomyoma    Essential hypertension    Status post gastric bypass for obesity    Cheloid scar       Nurse notes were reviewed and copied and are correct  Review of Systems - negative except as listed above in the HPI    Objective:     Vitals:    10/07/19 0949   BP: 99/65   Pulse: 69   Resp: 18   Temp: 98.1 °F (36.7 °C)   TempSrc: Oral   SpO2: 99%   Weight: 197 lb (89.4 kg)   Height: 5' 10\" (1.778 m)     Physical Examination: General appearance - alert, well appearing, and in no distress  Mental status - alert, oriented to person, place, and time  Chest - clear to auscultation, no wheezes, rales or rhonchi, symmetric air entry  Heart - normal rate, regular rhythm, normal S1, S2, no murmurs, rubs, clicks or gallops  Abdomen - tenderness noted upper midepigastric area         Assessment/ Plan:   Diagnoses and all orders for this visit:    1. Epigastric pain  -     REFERRAL TO GASTROENTEROLOGY  Possibly an ulcer  Will have GI eval, use omeprazole   2. Abnormal liver function test  -     HEPATIC FUNCTION PANEL  Check for current level of enzymes  3. Vitamin D deficiency  -     VITAMIN D, 25 HYDROXY    4. Essential hypertension    5. Vitamin B 12 deficiency  -     VITAMIN B12    6.  Status post gastric bypass for obesity  - REFERRAL TO GASTROENTEROLOGY    7. Screening for breast cancer  -     BEAR MAMMO BI SCREENING INCL CAD; Future    8. Screen for colon cancer    9. Encounter for immunization  -     INFLUENZA VIRUS VAC QUAD,SPLIT,PRESV FREE SYRINGE IM       Follow-up and Dispositions    · Return if symptoms worsen or fail to improve, for weight management. ICD-10-CM ICD-9-CM    1. Epigastric pain R10.13 789.06 REFERRAL TO GASTROENTEROLOGY   2. Abnormal liver function test R94.5 790.6 HEPATIC FUNCTION PANEL   3. Vitamin D deficiency E55.9 268.9 VITAMIN D, 25 HYDROXY   4. Essential hypertension I10 401.9    5. Vitamin B 12 deficiency E53.8 266.2 VITAMIN B12   6. Status post gastric bypass for obesity Z98.84 V45.86 REFERRAL TO GASTROENTEROLOGY   7. Screening for breast cancer Z12.39 V76.10 BEAR MAMMO BI SCREENING INCL CAD   8. Screen for colon cancer Z12.11 V76.51    9. Encounter for immunization Z23 V03.89 INFLUENZA VIRUS VAC QUAD,SPLIT,PRESV FREE SYRINGE IM       I have discussed the diagnosis with the patient and the intended plan as seen in the above orders. The patient has received an after-visit summary and questions were answered concerning future plans. Medication Side Effects and Warnings were discussed with patient: yes  Patient Labs were reviewed and or requested: yes  Patient Past Records were reviewed and or requested: yes        There are no Patient Instructions on file for this visit.     The patient verbalizes understanding and agrees with the plan of care        Patient has the advanced directives booklet to review

## 2019-10-08 LAB
25(OH)D3+25(OH)D2 SERPL-MCNC: 40.7 NG/ML (ref 30–100)
ALBUMIN SERPL-MCNC: 4.2 G/DL (ref 3.5–5.5)
ALP SERPL-CCNC: 105 IU/L (ref 39–117)
ALT SERPL-CCNC: 26 IU/L (ref 0–32)
AST SERPL-CCNC: 18 IU/L (ref 0–40)
BILIRUB DIRECT SERPL-MCNC: 0.17 MG/DL (ref 0–0.4)
BILIRUB SERPL-MCNC: 0.5 MG/DL (ref 0–1.2)
PROT SERPL-MCNC: 7.1 G/DL (ref 6–8.5)
VIT B12 SERPL-MCNC: 209 PG/ML (ref 232–1245)

## 2019-10-31 RX ORDER — CYANOCOBALAMIN 1000 UG/ML
1000 INJECTION, SOLUTION INTRAMUSCULAR; SUBCUTANEOUS
Qty: 10 ML | Refills: 0
Start: 2019-10-31 | End: 2022-03-24 | Stop reason: SDUPTHER

## 2019-11-01 NOTE — PROGRESS NOTES
The cholesterol is normal  The vit D is normal  The vit b12 is slightly low.  Come in for an injection

## 2019-11-05 ENCOUNTER — HOSPITAL ENCOUNTER (OUTPATIENT)
Dept: MAMMOGRAPHY | Age: 54
Discharge: HOME | End: 2019-11-05
Attending: FAMILY MEDICINE
Payer: COMMERCIAL

## 2019-11-05 DIAGNOSIS — Z12.31 VISIT FOR SCREENING MAMMOGRAM: ICD-10-CM

## 2019-11-05 PROCEDURE — 77067 SCR MAMMO BI INCL CAD: CPT

## 2019-11-05 NOTE — PROGRESS NOTES
Spoke with pt advised of lab results/recommendaitons. Scheduled for 11/6/19 at 7:30 am for b12 injection. Pt verbalized understanding and no further questions.

## 2019-11-13 ENCOUNTER — OFFICE VISIT (OUTPATIENT)
Dept: OBGYN CLINIC | Age: 54
End: 2019-11-13

## 2019-11-13 VITALS
SYSTOLIC BLOOD PRESSURE: 123 MMHG | DIASTOLIC BLOOD PRESSURE: 73 MMHG | WEIGHT: 195 LBS | HEIGHT: 70 IN | BODY MASS INDEX: 27.92 KG/M2

## 2019-11-13 DIAGNOSIS — N95.1 MENOPAUSAL SYMPTOM: ICD-10-CM

## 2019-11-13 DIAGNOSIS — Z01.419 ENCOUNTER FOR GYNECOLOGICAL EXAMINATION (GENERAL) (ROUTINE) WITHOUT ABNORMAL FINDINGS: Primary | ICD-10-CM

## 2019-11-13 RX ORDER — PROGESTERONE 100 MG/1
100 CAPSULE ORAL
Qty: 90 CAP | Refills: 4 | Status: SHIPPED | OUTPATIENT
Start: 2019-11-13 | End: 2022-05-19 | Stop reason: ALTCHOICE

## 2019-11-13 NOTE — PROGRESS NOTES
Opal Lopez is a ,  49 y.o. female [address]   who presents for her annual checkup. She is having some night sweats. .    Menstrual status:    Her periods are absent in flow. She denies dysmenorrhea. She reports no premenstrual symptoms. The patient is not using HRT. Contraception:    The current method of family planning is post menopausal status. Sexual history:    She  reports that she does not currently engage in sexual activity but has had partner(s) who are Male. She reports using the following method of birth control/protection: Condom. Medical conditions:    Since her last annual GYN exam about 17 ago, she has had the following changes in her health history: none. Pap and Mammogram History:    Her most recent Pap smear was normal/-HPV obtained 2016 year(s) ago. The patient had a recent mammogram 19 which was negative for malignancy. Breast Cancer History/Substance Abuse:    She has a family history of breast cancer. Osteoporosis History:    Family history does not include a first or second degree relative with osteopenia or osteoporosis. A bone density scan was not obtained. She is currently not taking calcium and vit D. Past Medical History:   Diagnosis Date    Anemia     Hypertension     Menorrhagia      Past Surgical History:   Procedure Laterality Date    HX  SECTION      HX GASTRIC BYPASS      HX NEPHRECTOMY       Current Outpatient Medications   Medication Sig Dispense Refill    Estradiol (DIVIGEL) 0.5 mg/0.5 gram (0.1 %) glpk 1 Packet by TransDERmal route daily. 90 Packet 4    progesterone (PROMETRIUM) 100 mg capsule Take 1 Cap by mouth nightly. 90 Cap 4    cyanocobalamin (VITAMIN B-12) 1,000 mcg/mL injection 1 mL by IntraMUSCular route every thirty (30) days. 10 mL 0    Omeprazole delayed release (PRILOSEC D/R) 20 mg tablet Take 20 mg by mouth daily.       magnesium citrate solution Take 296 mL by mouth now.  triamterene-hydroCHLOROthiazide (MAXZIDE) 75-50 mg per tablet Take 1 Tab by mouth daily. 90 Tab 3    ergocalciferol (ERGOCALCIFEROL) 50,000 unit capsule Take 1 Cap by mouth every seven (7) days. 12 Cap 0    clobetasol (TEMOVATE) 0.05 % external solution Apply to scalp once daily 50 mL 2     Allergies: Patient has no known allergies. Social History     Socioeconomic History    Marital status:      Spouse name: Not on file    Number of children: Not on file    Years of education: Not on file    Highest education level: Not on file   Occupational History    Not on file   Social Needs    Financial resource strain: Not on file    Food insecurity:     Worry: Not on file     Inability: Not on file    Transportation needs:     Medical: Not on file     Non-medical: Not on file   Tobacco Use    Smoking status: Never Smoker    Smokeless tobacco: Never Used   Substance and Sexual Activity    Alcohol use: No    Drug use: No    Sexual activity: Not Currently     Partners: Male     Birth control/protection: Condom   Lifestyle    Physical activity:     Days per week: Not on file     Minutes per session: Not on file    Stress: Not on file   Relationships    Social connections:     Talks on phone: Not on file     Gets together: Not on file     Attends Jainism service: Not on file     Active member of club or organization: Not on file     Attends meetings of clubs or organizations: Not on file     Relationship status: Not on file    Intimate partner violence:     Fear of current or ex partner: Not on file     Emotionally abused: Not on file     Physically abused: Not on file     Forced sexual activity: Not on file   Other Topics Concern    Not on file   Social History Narrative    Not on file     Tobacco History:  reports that she has never smoked. She has never used smokeless tobacco.  Alcohol Abuse:  reports that she does not drink alcohol.   Drug Abuse:  reports that she does not use drugs.   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    Uterine leiomyoma D25.9    Weight gain, abnormal R63.5    Gastroesophageal reflux disease without esophagitis K21.9         Review of Systems - History obtained from the patient  Constitutional: negative for weight loss, fever, night sweats  HEENT: negative for hearing loss, earache, congestion, snoring, sorethroat  CV: negative for chest pain, palpitations, edema  Resp: negative for cough, shortness of breath, wheezing  GI: negative for change in bowel habits, abdominal pain, black or bloody stools  : negative for frequency, dysuria, hematuria, vaginal discharge  MSK: negative for back pain, joint pain, muscle pain  Breast: negative for breast lumps, nipple discharge, galactorrhea  Skin :negative for itching, rash, hives  Neuro: negative for dizziness, headache, confusion, weakness  Psych: negative for anxiety, depression, change in mood  Heme/lymph: negative for bleeding, bruising, pallor    Physical Exam    Visit Vitals  /73   Ht 5' 10\" (1.778 m)   Wt 195 lb (88.5 kg)   BMI 27.98 kg/m²     Constitutional  · Appearance: well-nourished, well developed, alert, in no acute distress    HENT  · Head and Face: appears normal    Neck  · Inspection/Palpation: normal appearance, no masses or tenderness  · Lymph Nodes: no lymphadenopathy present  · Thyroid: gland size normal, nontender, no nodules or masses present on palpation    Chest  · Respiratory Effort: breathing normal  · Auscultation: normal breath sounds    Cardiovascular  · Heart:  · Auscultation: regular rate and rhythm without murmur    Breasts  · Inspection of Breasts: breasts symmetrical, no skin changes, no discharge present, nipple appearance normal, no skin retraction present  · Palpation of Breasts and Axillae: no masses present on palpation, no breast tenderness  · Axillary Lymph Nodes: no lymphadenopathy present    Gastrointestinal  · Abdominal Examination: abdomen non-tender to palpation, normal bowel sounds, no masses present  · Liver and spleen: no hepatomegaly present, spleen not palpable  · Hernias: no hernias identified    Skin  · General Inspection: no rash, no lesions identified    Neurologic/Psychiatric  · Mental Status:  · Orientation: grossly oriented to person, place and time  · Mood and Affect: mood normal, affect appropriate    Genitourinary  · External Genitalia: normal appearance for age, no discharge present, no tenderness present, no inflammatory lesions present, no masses present, no atrophy present  · Vagina: normal vaginal vault without central or paravaginal defects, no discharge present, no inflammatory lesions present, no masses present  · Bladder: non-tender to palpation  · Urethra: appears normal  · Cervix: normal   · Uterus: normal size, shape and consistency  · Adnexa: no adnexal tenderness present, no adnexal masses present  · Perineum: perineum within normal limits, no evidence of trauma, no rashes or skin lesions present  · Anus: anus within normal limits, no hemorrhoids present  · Inguinal Lymph Nodes: no lymphadenopathy present    Assessment:  Routine gynecologic examination  Menopausal symptoms    Plan:  Counseled re: diet, exercise, healthy lifestyle  Return for yearly wellness visits  Rec annual mammogram  Start Divigel/prometrium - disc risk of DVT

## 2020-02-27 DIAGNOSIS — E53.8 VITAMIN B12 DEFICIENCY: ICD-10-CM

## 2020-02-27 DIAGNOSIS — E53.8 VITAMIN B 12 DEFICIENCY: ICD-10-CM

## 2020-02-27 NOTE — TELEPHONE ENCOUNTER
Patient would like to know if a small amount could be sent to there pharmacy until her appointmentMonday, March 02, 2020 02:00 PM

## 2020-02-28 RX ORDER — CYANOCOBALAMIN 1000 UG/ML
1000 INJECTION, SOLUTION INTRAMUSCULAR; SUBCUTANEOUS
Start: 2020-02-28

## 2020-03-02 ENCOUNTER — HOSPITAL ENCOUNTER (OUTPATIENT)
Dept: LAB | Age: 55
Discharge: HOME | End: 2020-03-02

## 2020-03-02 ENCOUNTER — OFFICE VISIT (OUTPATIENT)
Dept: FAMILY MEDICINE CLINIC | Age: 55
End: 2020-03-02

## 2020-03-02 VITALS
BODY MASS INDEX: 29.06 KG/M2 | RESPIRATION RATE: 16 BRPM | TEMPERATURE: 98 F | OXYGEN SATURATION: 97 % | WEIGHT: 203 LBS | HEIGHT: 70 IN | HEART RATE: 85 BPM | SYSTOLIC BLOOD PRESSURE: 102 MMHG | DIASTOLIC BLOOD PRESSURE: 69 MMHG

## 2020-03-02 DIAGNOSIS — L65.9 HAIR LOSS: ICD-10-CM

## 2020-03-02 DIAGNOSIS — L65.9 HAIR LOSS: Primary | ICD-10-CM

## 2020-03-02 DIAGNOSIS — E53.8 VITAMIN B 12 DEFICIENCY: ICD-10-CM

## 2020-03-02 DIAGNOSIS — E55.9 VITAMIN D DEFICIENCY: ICD-10-CM

## 2020-03-02 DIAGNOSIS — I10 ESSENTIAL HYPERTENSION: ICD-10-CM

## 2020-03-02 DIAGNOSIS — L65.9 ALOPECIA: ICD-10-CM

## 2020-03-02 RX ORDER — TRIAMTERENE AND HYDROCHLOROTHIAZIDE 75; 50 MG/1; MG/1
1 TABLET ORAL DAILY
Qty: 90 TAB | Refills: 3 | Status: SHIPPED | OUTPATIENT
Start: 2020-03-02 | End: 2021-02-12 | Stop reason: SDUPTHER

## 2020-03-02 RX ORDER — CLOBETASOL PROPIONATE 0.46 MG/ML
SOLUTION TOPICAL
Qty: 50 ML | Refills: 2 | Status: SHIPPED | OUTPATIENT
Start: 2020-03-02 | End: 2022-03-21 | Stop reason: SDUPTHER

## 2020-03-02 NOTE — PROGRESS NOTES
1. Have you been to the ER, urgent care clinic since your last visit? Hospitalized since your last visit? No    2. Have you seen or consulted any other health care providers outside of the [address] [place] [address] since your last visit? Include any pap smears or colon screening.  No     Chief Complaint   Patient presents with    Medication Refill    Follow-up    Hair/Scalp Problem     Patient would like to discuss Derm Referral.      Visit Vitals  /69 (BP 1 Location: Left arm, BP Patient Position: Sitting)   Pulse 85   Temp 98 °F (36.7 °C) (Oral)   Resp 16   Ht 5' 10\" (1.778 m)   Wt 203 lb (92.1 kg)   SpO2 97%   BMI 29.13 kg/m²

## 2020-03-02 NOTE — PROGRESS NOTES
Chief Complaint   Patient presents with    Medication Refill    Follow-up    Hair/Scalp Problem     Patient would like to discuss Derm Referral.      she is a 49y.o. year old female who presents for evalution. Hair has fallen out again, it had grown back really well on the back of the scalp  She is wearing a wig today  She is post GBP, has not been here for labs in 6 months. She had b12 checked almost a year ago  I referred herto derm but she has not continued to follow up with her  Reviewed PmHx, RsxHx, FmHx, SocHx, AllgHx and updated and dated in the chart.     Aspirin yes ____   No____ N/A____    Patient Active Problem List    Diagnosis    Weight gain, abnormal    Gastroesophageal reflux disease without esophagitis    Uterine leiomyoma    Essential hypertension    Status post gastric bypass for obesity    Cheloid scar       Nurse notes were reviewed and copied and are correct  Review of Systems - negative except as listed above in the HPI    Objective:     Vitals:    03/02/20 1406   BP: 102/69   Pulse: 85   Resp: 16   Temp: 98 °F (36.7 °C)   TempSrc: Oral   SpO2: 97%   Weight: 203 lb (92.1 kg)   Height: 5' 10\" (1.778 m)        Physical Examination: General appearance - alert, well appearing, and in no distress and oriented to person, place, and time  Mental status - alert, oriented to person, place, and time  Neck - supple, no significant adenopathy  Chest - clear to auscultation, no wheezes, rales or rhonchi, symmetric air entry  Heart - normal rate, regular rhythm, normal S1, S2, no murmurs, rubs, clicks or gallops  Abdomen - soft, nontender, nondistended, no masses or organomegaly  Neurological - alert, oriented, normal speech, no focal findings or movement disorder noted  Musculoskeletal - no joint tenderness, deformity or swelling  Extremities - peripheral pulses normal, no pedal edema, no clubbing or cyanosis  Skin: very this hair across the frontal area and on the occiput in nestor    Assessment/ Plan:   Diagnoses and all orders for this visit:    1. Hair loss  -     REFERRAL TO DERMATOLOGY  -     VITAMIN D, 25 HYDROXY; Future  -     VITAMIN B12; Future    2. Essential hypertension  -     triamterene-hydroCHLOROthiazide (MAXZIDE) 75-50 mg per tablet; Take 1 Tab by mouth daily. 3. Alopecia  -     clobetasoL (TEMOVATE) 0.05 % external solution; Apply to scalp once daily  -     VITAMIN D, 25 HYDROXY; Future  -     VITAMIN B12; Future  Apply to the areas of hair thinning daily until sees derm and then I defer to her    4. Vitamin B 12 deficiency  -     VITAMIN B12; Future    5. Vitamin D deficiency  -     VITAMIN D, 25 HYDROXY; Future           ICD-10-CM ICD-9-CM    1. Hair loss L65.9 704.00 REFERRAL TO DERMATOLOGY      VITAMIN D, 25 HYDROXY      VITAMIN B12   2. Essential hypertension I10 401.9 triamterene-hydroCHLOROthiazide (MAXZIDE) 75-50 mg per tablet   3. Alopecia L65.9 704.00 clobetasoL (TEMOVATE) 0.05 % external solution      VITAMIN D, 25 HYDROXY      VITAMIN B12   4. Vitamin B 12 deficiency E53.8 266.2 VITAMIN B12   5. Vitamin D deficiency E55.9 268.9 VITAMIN D, 25 HYDROXY       I have discussed the diagnosis with the patient and the intended plan as seen in the above orders. The patient has received an after-visit summary and questions were answered concerning future plans. Medication Side Effects and Warnings were discussed with patient: yes  Patient Labs were reviewed and or requested: yes  Patient Past Records were reviewed and or requested: yes        There are no Patient Instructions on file for this visit.     The patient verbalizes understanding and agrees with the plan of care        Patient has the advanced directives booklet to review

## 2020-03-03 LAB
25(OH)D3 SERPL-MCNC: 23.6 NG/ML (ref 30–100)
ALBUMIN SERPL-MCNC: 4.1 G/DL (ref 3.5–5)
ALBUMIN/GLOB SERPL: 1.1 {RATIO} (ref 1.1–2.2)
ALP SERPL-CCNC: 142 U/L (ref 45–117)
ALT SERPL-CCNC: 26 U/L (ref 12–78)
ANION GAP SERPL CALC-SCNC: 8 MMOL/L (ref 5–15)
AST SERPL-CCNC: 16 U/L (ref 15–37)
BILIRUB SERPL-MCNC: 0.4 MG/DL (ref 0.2–1)
BUN SERPL-MCNC: 21 MG/DL (ref 6–20)
BUN/CREAT SERPL: 31 (ref 12–20)
CALCIUM SERPL-MCNC: 9.5 MG/DL (ref 8.5–10.1)
CHLORIDE SERPL-SCNC: 102 MMOL/L (ref 97–108)
CO2 SERPL-SCNC: 28 MMOL/L (ref 21–32)
CREAT SERPL-MCNC: 0.68 MG/DL (ref 0.55–1.02)
ERYTHROCYTE [SEDIMENTATION RATE] IN BLOOD: 34 MM/HR (ref 0–30)
GLOBULIN SER CALC-MCNC: 3.9 G/DL (ref 2–4)
GLUCOSE SERPL-MCNC: 91 MG/DL (ref 65–100)
POTASSIUM SERPL-SCNC: 3.5 MMOL/L (ref 3.5–5.1)
PROT SERPL-MCNC: 8 G/DL (ref 6.4–8.2)
SODIUM SERPL-SCNC: 138 MMOL/L (ref 136–145)
VIT B12 SERPL-MCNC: 301 PG/ML (ref 193–986)

## 2020-03-04 LAB — ANA SER QL: NEGATIVE

## 2020-03-05 RX ORDER — ERGOCALCIFEROL 1.25 MG/1
50000 CAPSULE ORAL
Qty: 12 CAP | Refills: 0 | Status: SHIPPED | OUTPATIENT
Start: 2020-03-05 | End: 2020-03-22 | Stop reason: SDUPTHER

## 2020-03-22 RX ORDER — ERGOCALCIFEROL 1.25 MG/1
50000 CAPSULE ORAL
Qty: 12 CAP | Refills: 0 | Status: SHIPPED | OUTPATIENT
Start: 2020-03-22 | End: 2020-06-08

## 2020-03-22 NOTE — PROGRESS NOTES
The b12 is normal  The vit D is low- I am sending a prescription to the pharmacy  The liver and kidney tests  are good

## 2020-03-23 ENCOUNTER — TELEPHONE (OUTPATIENT)
Dept: FAMILY MEDICINE CLINIC | Age: 55
End: 2020-03-23

## 2020-03-23 NOTE — TELEPHONE ENCOUNTER
Two patient Identification confirmed. I spoke with the patient about The b12 is normal   The vit D is low- I am sending a prescription to the pharmacy   The liver and kidney tests  are good     Patient verbalized understanding.

## 2020-04-24 ENCOUNTER — VIRTUAL VISIT (OUTPATIENT)
Dept: FAMILY MEDICINE CLINIC | Age: 55
End: 2020-04-24

## 2020-04-24 DIAGNOSIS — I10 ESSENTIAL HYPERTENSION: ICD-10-CM

## 2020-04-24 DIAGNOSIS — L65.9 ALOPECIA: ICD-10-CM

## 2020-04-24 DIAGNOSIS — Z98.84 STATUS POST GASTRIC BYPASS FOR OBESITY: ICD-10-CM

## 2020-04-24 DIAGNOSIS — L65.9 HAIR LOSS: ICD-10-CM

## 2020-04-24 DIAGNOSIS — R63.5 WEIGHT GAIN, ABNORMAL: Primary | ICD-10-CM

## 2020-04-24 RX ORDER — BUPROPION HYDROCHLORIDE 75 MG/1
75 TABLET ORAL 2 TIMES DAILY
Qty: 60 TAB | Refills: 2 | Status: SHIPPED | OUTPATIENT
Start: 2020-04-24 | End: 2020-08-03 | Stop reason: SDUPTHER

## 2020-04-24 NOTE — PROGRESS NOTES
Debora Almanzar is a 49 y.o. female who was seen by synchronous (real-time) audio-video technology on 4/24/2020. Consent:  She and/or her healthcare decision maker is aware that this patient-initiated Telehealth encounter is a billable service, with coverage as determined by her insurance carrier. She is aware that she may receive a bill and has provided verbal consent to proceed: Yes    I was at home while conducting this encounter. Debora Almanzar is a 49 y.o. female  who is here for her follow up  Evaluation for the medical bariatric care. CC: I want to get control of my eating    Weight History  Current weight not sure and BMI is There is no height or weight on file to calculate BMI. Goal weight    Highest weight 253  (See weight gain time line scanned into media section of chart)        Significant Medical History    Update on sleep apnea and  CPAP no    Ever had bariatric surgery: no    Pregnant or planning on becoming pregnant w/in 6 months: no         Significant Psychosocial History     Current Major Lifestyle Changes: no  Any potential unsupportive people: no          Social History  Social History     Tobacco Use    Smoking status: Never Smoker    Smokeless tobacco: Never Used   Substance Use Topics    Alcohol use: No     How many times a week do you eat out?  many    Do you drink any EtOH?  no   If so, how much? Do you have upcoming any travel in the next 6 weeks?  no   If so, what do you have planned? Exercise  How many days a week do you currently exercise?  0 days  Have you ever been told by a physician not to exercise?  no      Objective  There were no vitals taken for this visit.       Waist Circumference: See Weight Management Doc Flowsheet  Neck Circumference: See Weight Management Doc Flowsheet  Percent Body Fat: See Weight Management Doc Flowsheet  Weight Metrics 3/2/2020 11/13/2019 10/7/2019 8/22/2019 4/1/2019 11/27/2018 9/14/2018   Weight 203 lb 195 lb 197 lb 199 lb 215 lb 238 lb 1.6 oz 243 lb   Neck Circ (inches) - - - - - - -   Waist Measure Inches - - - - - - -   Exercise Mins/week - - - - - - -   Body Fat % - - - - - - -   BMI 29.13 kg/m2 27.98 kg/m2 28.27 kg/m2 28.55 kg/m2 30.85 kg/m2 34.16 kg/m2 34.87 kg/m2         Labs:       Review of Systems  Complete ROS negative except where noted above          712  Subjective:   Samina Level was seen for No chief complaint on file. Prior to Admission medications    Medication Sig Start Date End Date Taking? Authorizing Provider   buPROPion [facility]) 75 mg tablet Take 1 Tab by mouth two (2) times a day. 4/24/20  Yes Neeraj Leonardo MD   ergocalciferol (ERGOCALCIFEROL) 1,250 mcg (50,000 unit) capsule Take 1 Cap by mouth every seven (7) days for 12 doses. 3/22/20 6/8/20  Neeraj Leonardo MD   triamterene-hydroCHLOROthiazide (MAXZIDE) 75-50 mg per tablet Take 1 Tab by mouth daily. 3/2/20   Neeraj Leonardo MD   clobetasoL (TEMOVATE) 0.05 % external solution Apply to scalp once daily 3/2/20   Neeraj Leonardo MD   Estradiol (DIVIGEL) 0.5 mg/0.5 gram (0.1 %) glpk 1 Packet by TransDERmal route daily. 11/13/19   Lavonia Dakin, MD   progesterone (PROMETRIUM) 100 mg capsule Take 1 Cap by mouth nightly. 11/13/19   Lavonia Dakin, MD   cyanocobalamin (VITAMIN B-12) 1,000 mcg/mL injection 1 mL by IntraMUSCular route every thirty (30) days. 10/31/19   Neeraj Leonardo MD   Omeprazole delayed release (PRILOSEC D/R) 20 mg tablet Take 20 mg by mouth daily. Provider, Historical   magnesium citrate solution Take 296 mL by mouth now.  Patient reports not taking    Provider, Historical     No Known Allergies    Patient Active Problem List    Diagnosis Date Noted    Weight gain, abnormal 08/02/2018    Gastroesophageal reflux disease without esophagitis 08/02/2018    Uterine leiomyoma 08/26/2016    Essential hypertension 03/29/2016    Status post gastric bypass for obesity 03/29/2016    Cheloid scar 03/29/2016     Current Outpatient Medications   Medication Sig Dispense Refill    buPROPion (WELLBUTRIN) 75 mg tablet Take 1 Tab by mouth two (2) times a day. 60 Tab 2    ergocalciferol (ERGOCALCIFEROL) 1,250 mcg (50,000 unit) capsule Take 1 Cap by mouth every seven (7) days for 12 doses. 12 Cap 0    triamterene-hydroCHLOROthiazide (MAXZIDE) 75-50 mg per tablet Take 1 Tab by mouth daily. 90 Tab 3    clobetasoL (TEMOVATE) 0.05 % external solution Apply to scalp once daily 50 mL 2    Estradiol (DIVIGEL) 0.5 mg/0.5 gram (0.1 %) glpk 1 Packet by TransDERmal route daily. 90 Packet 4    progesterone (PROMETRIUM) 100 mg capsule Take 1 Cap by mouth nightly. 90 Cap 4    cyanocobalamin (VITAMIN B-12) 1,000 mcg/mL injection 1 mL by IntraMUSCular route every thirty (30) days. 10 mL 0    Omeprazole delayed release (PRILOSEC D/R) 20 mg tablet Take 20 mg by mouth daily.  magnesium citrate solution Take 296 mL by mouth now. Patient reports not taking         ROS    PHYSICAL EXAMINATION:  [ INSTRUCTIONS:  \"[x]\" Indicates a positive item  \"[]\" Indicates a negative item  -- DELETE ALL ITEMS NOT EXAMINED]  Vital Signs: (As obtained by patient/caregiver at home)  There were no vitals taken for this visit.      Constitutional: [x] Appears well-developed and well-nourished [x] No apparent distress      [] Abnormal -     Mental status: [x] Alert and awake  [x] Oriented to person/place/time [x] Able to follow commands    [] Abnormal -     Eyes:   EOM    [x]  Normal    [] Abnormal -   Sclera  [x]  Normal    [] Abnormal -          Discharge [x]  None visible   [] Abnormal -     HENT: [x] Normocephalic, atraumatic  [] Abnormal -   [x] Mouth/Throat: Mucous membranes are moist    External Ears [x] Normal  [] Abnormal -    Neck: [x] No visualized mass [] Abnormal -     Pulmonary/Chest: [x] Respiratory effort normal   [x] No visualized signs of difficulty breathing or respiratory distress        [] Abnormal -      Musculoskeletal:   [x] Normal gait with no signs of ataxia         [x] Normal range of motion of neck        [] Abnormal -     Neurological:        [x] No Facial Asymmetry (Cranial nerve 7 motor function) (limited exam due to video visit)          [x] No gaze palsy        [] Abnormal -          Skin:        [x] No significant exanthematous lesions or discoloration noted on facial skin         [] Abnormal -            Psychiatric:       [x] Normal Affect [] Abnormal -        [x] No Hallucinations    Other pertinent observable physical exam findings:-      Assessment & Plan  Diagnoses and all orders for this visit:    1. Weight gain, abnormal  -     buPROPion (WELLBUTRIN) 75 mg tablet; Take 1 Tab by mouth two (2) times a day. No recent ekg so I will not use phentermine  Try wellbutrin  2. Essential hypertension  Check bp on next visit 2 week  3. Obesity  Diet:    Activity: ty to increase by taking a walk in neighborhood and or do youtube video    Medication: add wellbutrin    3. Status post gastric bypass for obesity  Stable, vitamins are norml  4. Hair loss    5. Alopecia    Followed in Dermitology          Based on his history and exam, Yanet Norton is a good candidate for the  [facility] Program     There are no Patient Instructions on file for this visit. Coding Help - Use CPT Codes 83458-40641, 38932-23802 for Established and New Patients respectively, either employing EM elements or Time rules. Other codes (example consult codes) may also apply. The primary encounter diagnosis was Weight gain, abnormal. Diagnoses of Essential hypertension, Status post gastric bypass for obesity, Hair loss, and Alopecia were also pertinent to this visit. The patient verbalizes understanding and agrees with the plan of care      We discussed the expected course, resolution and complications of the diagnosis(es) in detail. Medication risks, benefits, costs, interactions, and alternatives were discussed as indicated.   I advised her to contact the office if her condition worsens, changes or fails to improve as anticipated. She expressed understanding with the diagnosis(es) and plan. Pursuant to the emergency declaration under the [facility], [facility] waiver authority and the [employer] Act, this Virtual  Visit was conducted, with patient's consent, to reduce the patient's risk of exposure to COVID-19 and provide continuity of care for an established patient. Services were provided through a video synchronous discussion virtually to substitute for in-person clinic visit.     Lali Nichols MD

## 2020-06-01 ENCOUNTER — VIRTUAL VISIT (OUTPATIENT)
Dept: FAMILY MEDICINE CLINIC | Age: 55
End: 2020-06-01

## 2020-06-01 DIAGNOSIS — Z13.6 SCREENING FOR ISCHEMIC HEART DISEASE: ICD-10-CM

## 2020-06-01 DIAGNOSIS — I10 ESSENTIAL HYPERTENSION: Primary | ICD-10-CM

## 2020-06-01 DIAGNOSIS — E66.9 OBESITY (BMI 30-39.9): ICD-10-CM

## 2020-06-01 DIAGNOSIS — Z98.84 STATUS POST GASTRIC BYPASS FOR OBESITY: ICD-10-CM

## 2020-06-01 DIAGNOSIS — Z79.899 MEDICATION MANAGEMENT: ICD-10-CM

## 2020-06-01 NOTE — PROGRESS NOTES
Rob Castillo is a 49 y.o. female who was seen by synchronous (real-time) audio-video technology on 6/1/2020. Consent:  She and/or her healthcare decision maker is aware that this patient-initiated Telehealth encounter is a billable service, with coverage as determined by her insurance carrier. She is aware that she may receive a bill and has provided verbal consent to proceed: Yes    I was at home while conducting this encounter. Rob Castillo is a 49 y.o. female  who is here for her follow up  Evaluation for the medical bariatric care. CC: I want to be healthier  She has taken contraave and then tried  wellbutrin by itself  She canot tolerate them   She wants to try phentermine  Weight History  Current weight  and BMI is There is no height or weight on file to calculate BMI. Goal weight , BMI 24-25  Highest weight 242  (See weight gain time line scanned into media section of chart)        Significant Medical History    Update on sleep apnea and  CPAP no    Ever had bariatric surgery: no    Pregnant or planning on becoming pregnant w/in 6 months: no         Significant Psychosocial History     Current Major Lifestyle Changes: no  Any potential unsupportive people: no          Social History  Social History     Tobacco Use    Smoking status: Never Smoker    Smokeless tobacco: Never Used   Substance Use Topics    Alcohol use: No     How many times a week do you eat out?  0    Do you drink any EtOH?  no   If so, how much? Do you have upcoming any travel in the next 6 weeks?  no   If so, what do you have planned? Exercise  How many days a week do you currently exercise?  0 days  Have you ever been told by a physician not to exercise?  no      Objective  There were no vitals taken for this visit.       Waist Circumference: See Weight Management Doc Flowsheet  Neck Circumference: See Weight Management Doc Flowsheet  Percent Body Fat: See Weight Management Doc Flowsheet  Weight Metrics 3/2/2020 11/13/2019 10/7/2019 8/22/2019 4/1/2019 11/27/2018 9/14/2018   Weight 203 lb 195 lb 197 lb 199 lb 215 lb 238 lb 1.6 oz 243 lb   Neck Circ (inches) - - - - - - -   Waist Measure Inches - - - - - - -   Exercise Mins/week - - - - - - -   Body Fat % - - - - - - -   BMI 29.13 kg/m2 27.98 kg/m2 28.27 kg/m2 28.55 kg/m2 30.85 kg/m2 34.16 kg/m2 34.87 kg/m2         Labs:     Review of Systems  Complete ROS negative except where noted above          712  Subjective:   Shameka Eddy was seen for No chief complaint on file. Prior to Admission medications    Medication Sig Start Date End Date Taking? Authorizing Provider   buPROPion [facility]) 75 mg tablet Take 1 Tab by mouth two (2) times a day. 4/24/20   Lily Toribio MD   ergocalciferol (ERGOCALCIFEROL) 1,250 mcg (50,000 unit) capsule Take 1 Cap by mouth every seven (7) days for 12 doses. 3/22/20 6/8/20  Lily Toribio MD   triamterene-hydroCHLOROthiazide (MAXZIDE) 75-50 mg per tablet Take 1 Tab by mouth daily. 3/2/20   Lily Toribio MD   clobetasoL (TEMOVATE) 0.05 % external solution Apply to scalp once daily 3/2/20   Lily Toribio MD   Estradiol (DIVIGEL) 0.5 mg/0.5 gram (0.1 %) glpk 1 Packet by TransDERmal route daily. 11/13/19   Feliz Gongora MD   progesterone (PROMETRIUM) 100 mg capsule Take 1 Cap by mouth nightly. 11/13/19   Feliz Gongora MD   cyanocobalamin (VITAMIN B-12) 1,000 mcg/mL injection 1 mL by IntraMUSCular route every thirty (30) days. 10/31/19   Lily Toribio MD   Omeprazole delayed release (PRILOSEC D/R) 20 mg tablet Take 20 mg by mouth daily. Provider, Historical   magnesium citrate solution Take 296 mL by mouth now.  Patient reports not taking    Provider, Historical     No Known Allergies    Patient Active Problem List    Diagnosis Date Noted    Weight gain, abnormal 08/02/2018    Gastroesophageal reflux disease without esophagitis 08/02/2018    Uterine leiomyoma 08/26/2016    Essential hypertension 03/29/2016    Status post gastric bypass for obesity 03/29/2016    Cheloid scar 03/29/2016     Current Outpatient Medications   Medication Sig Dispense Refill    buPROPion (WELLBUTRIN) 75 mg tablet Take 1 Tab by mouth two (2) times a day. 60 Tab 2    ergocalciferol (ERGOCALCIFEROL) 1,250 mcg (50,000 unit) capsule Take 1 Cap by mouth every seven (7) days for 12 doses. 12 Cap 0    triamterene-hydroCHLOROthiazide (MAXZIDE) 75-50 mg per tablet Take 1 Tab by mouth daily. 90 Tab 3    clobetasoL (TEMOVATE) 0.05 % external solution Apply to scalp once daily 50 mL 2    Estradiol (DIVIGEL) 0.5 mg/0.5 gram (0.1 %) glpk 1 Packet by TransDERmal route daily. 90 Packet 4    progesterone (PROMETRIUM) 100 mg capsule Take 1 Cap by mouth nightly. 90 Cap 4    cyanocobalamin (VITAMIN B-12) 1,000 mcg/mL injection 1 mL by IntraMUSCular route every thirty (30) days. 10 mL 0    Omeprazole delayed release (PRILOSEC D/R) 20 mg tablet Take 20 mg by mouth daily.  magnesium citrate solution Take 296 mL by mouth now. Patient reports not taking         ROS    PHYSICAL EXAMINATION:  [ INSTRUCTIONS:  \"[x]\" Indicates a positive item  \"[]\" Indicates a negative item  -- DELETE ALL ITEMS NOT EXAMINED]  Vital Signs: (As obtained by patient/caregiver at home)  There were no vitals taken for this visit.      Constitutional: [x] Appears well-developed and well-nourished [x] No apparent distress      [] Abnormal -     Mental status: [x] Alert and awake  [x] Oriented to person/place/time [x] Able to follow commands    [] Abnormal -     Eyes:   EOM    [x]  Normal    [] Abnormal -   Sclera  [x]  Normal    [] Abnormal -          Discharge [x]  None visible   [] Abnormal -     HENT: [x] Normocephalic, atraumatic  [] Abnormal -   [x] Mouth/Throat: Mucous membranes are moist    External Ears [x] Normal  [] Abnormal -    Neck: [x] No visualized mass [] Abnormal -     Pulmonary/Chest: [x] Respiratory effort normal   [x] No visualized signs of difficulty breathing or respiratory distress        [] Abnormal -      Musculoskeletal:   [x] Normal gait with no signs of ataxia         [x] Normal range of motion of neck        [] Abnormal -     Neurological:        [x] No Facial Asymmetry (Cranial nerve 7 motor function) (limited exam due to video visit)          [x] No gaze palsy        [] Abnormal -          Skin:        [x] No significant exanthematous lesions or discoloration noted on facial skin         [] Abnormal -            Psychiatric:       [x] Normal Affect [] Abnormal -        [x] No Hallucinations    Other pertinent observable physical exam findings:-      Assessment & Plan  Diagnoses and all orders for this visit:    1. Essential hypertension  -     AMB POC EKG ROUTINE W/ 12 LEADS, INTER & REP; Future    2. Obesity (BMI 30-39. 9)  Diet: cont to use LCD low carb    Activity: exercise aiming for 30 min 5 days a week    Medication: phentermine after getting ekg    3. Status post gastric bypass for obesity    4. Screening for ischemic heart disease  -     AMB POC EKG ROUTINE W/ 12 LEADS, INTER & REP; Future    5. Medication management  -     AMB POC EKG ROUTINE W/ 12 LEADS, INTER & REP; Future              Based on his history and exam, Chen Kinsey  Is a good candidate for the  [facility] Weight Loss Program     There are no Patient Instructions on file for this visit. Coding Help - Use CPT Codes 11466-04342, 02871-16809 for Established and New Patients respectively, either employing EM elements or Time rules. Other codes (example consult codes) may also apply. The primary encounter diagnosis was Essential hypertension. Diagnoses of Obesity (BMI 30-39.9), Status post gastric bypass for obesity, Screening for ischemic heart disease, and Medication management were also pertinent to this visit. The patient verbalizes understanding and agrees with the plan of care      We discussed the expected course, resolution and complications of the diagnosis(es) in detail. Medication risks, benefits, costs, interactions, and alternatives were discussed as indicated. I advised her to contact the office if her condition worsens, changes or fails to improve as anticipated. She expressed understanding with the diagnosis(es) and plan. Pursuant to the emergency declaration under the [facility], [facility] waiver authority and the [employer] and [employer] Act, this Virtual  Visit was conducted, with patient's consent, to reduce the patient's risk of exposure to COVID-19 and provide continuity of care for an established patient. Services were provided through a video synchronous discussion virtually to substitute for in-person clinic visit.     Gisselle Bryant MD

## 2020-06-02 ENCOUNTER — TELEPHONE (OUTPATIENT)
Dept: FAMILY MEDICINE CLINIC | Age: 55
End: 2020-06-02

## 2020-06-02 NOTE — TELEPHONE ENCOUNTER
Call from Call Center:      Hernandez, [address] Message/Vendor Calls     Caller's first and last name: Alice Westbrook       Reason for call: schedule a EKG       Callback required yes/no and why:yes       Best contact number(s):[phone]       Details to clarify the request: Please call the patient back to set up a EKG at [address]          Thank you

## 2020-06-02 NOTE — TELEPHONE ENCOUNTER
Spoke with the patient.     Thursday, June 4, 2020 08:40 AM  [facility]     EKG only per Dr. Mikel Garrido of [address] [facility] due to [facility] [place] protocol----view encounter

## 2020-06-02 NOTE — TELEPHONE ENCOUNTER
----- [facility] at 6/2/2020 12:28 PM EDT -----  Pt sees Dr Rossy Dsouza from [address] [facility] and she wanted her to come to your office to have a EKG. Pts number is [phone].

## 2020-06-04 ENCOUNTER — OFFICE VISIT (OUTPATIENT)
Dept: FAMILY MEDICINE CLINIC | Age: 55
End: 2020-06-04

## 2020-06-04 VITALS
OXYGEN SATURATION: 97 % | DIASTOLIC BLOOD PRESSURE: 61 MMHG | TEMPERATURE: 98.1 F | BODY MASS INDEX: 29.12 KG/M2 | HEIGHT: 70 IN | WEIGHT: 203.4 LBS | RESPIRATION RATE: 16 BRPM | SYSTOLIC BLOOD PRESSURE: 92 MMHG | HEART RATE: 91 BPM

## 2020-06-04 DIAGNOSIS — R63.5 WEIGHT GAIN, ABNORMAL: ICD-10-CM

## 2020-06-04 DIAGNOSIS — E66.9 OBESITY (BMI 30-39.9): ICD-10-CM

## 2020-06-04 DIAGNOSIS — I10 ESSENTIAL HYPERTENSION: Primary | ICD-10-CM

## 2020-06-04 RX ORDER — BUPROPION HYDROCHLORIDE 75 MG/1
75 TABLET ORAL 2 TIMES DAILY
Qty: 60 TAB | Refills: 2 | OUTPATIENT
Start: 2020-06-04

## 2020-06-04 NOTE — TELEPHONE ENCOUNTER
Call from Call Center:      Gerhard Dance, [address]              General Message/Vendor Calls     Pt is requesting that medical weight loss medication be called into   [facility] [employer] listed in chart. She reports that she completed her EKG today.        Callback required   Best contact number(s): [phone]               [address]          Thank you

## 2020-06-04 NOTE — PROGRESS NOTES
Subjective:   Marie Rubi is an 49 y.o. female who presents for  EKG. HPI  Chief Complaint   Patient presents with    Follow-up     Patient is coming in for an ECG. She states she is strating the [employer] program. No other concerns. The patient is followed by Dr. Angel nicholas for weight loss. She requested EKG to be done today. She does not have any concerns today. BP is well controlled with current regimen -Maxzide 75-50. No chest pain, no palpitations, no SOB. BP today 92/61. Allergies - reviewed:   No Known Allergies      Medications - reviewed:  Current Outpatient Medications   Medication Sig    triamterene-hydroCHLOROthiazide (MAXZIDE) 75-50 mg per tablet Take 1 Tab by mouth daily.  clobetasoL (TEMOVATE) 0.05 % external solution Apply to scalp once daily    cyanocobalamin (VITAMIN B-12) 1,000 mcg/mL injection 1 mL by IntraMUSCular route every thirty (30) days.  buPROPion (WELLBUTRIN) 75 mg tablet Take 1 Tab by mouth two (2) times a day.  ergocalciferol (ERGOCALCIFEROL) 1,250 mcg (50,000 unit) capsule Take 1 Cap by mouth every seven (7) days for 12 doses.  Estradiol (DIVIGEL) 0.5 mg/0.5 gram (0.1 %) glpk 1 Packet by TransDERmal route daily.  progesterone (PROMETRIUM) 100 mg capsule Take 1 Cap by mouth nightly.  Omeprazole delayed release (PRILOSEC D/R) 20 mg tablet Take 20 mg by mouth daily.  magnesium citrate solution Take 296 mL by mouth now. Patient reports not taking     No current facility-administered medications for this visit. Past Medical History - reviewed:  Past Medical History:   Diagnosis Date    Anemia     Hypertension     Menorrhagia          Review of Systems   CONSTITUTIONAL: denies fever. Denies chills. CARDIOVASCULAR: denies chest pain.  Denies palpitations  RESPIRATORY: denies shortness of breath    Objective:     Visit Vitals  BP 92/61 (BP 1 Location: Left arm, BP Patient Position: Sitting)   Pulse 91   Temp 98.1 °F (36.7 °C) (Oral)   Resp 16   Ht 5' 10\" (1.778 m)   Wt 203 lb 6.4 oz (92.3 kg)   SpO2 97%   BMI 29.18 kg/m²       General appearance - alert, well appearing, and in no distress  Chest - clear to auscultation, no wheezes, rales or rhonchi, symmetric air entry  Heart - normal rate, regular rhythm, normal S1, S2, no murmurs, rubs, clicks or gallops  Extremities - peripheral pulses normal, no pedal edema, no clubbing or cyanosis  Skin - normal coloration and turgor, no rashes, no suspicious skin lesions noted      Assessment:   Stephanie Malloy is a 49 y.o. female who was seen today for:    ICD-10-CM ICD-9-CM    1. Essential hypertension I10 401.9 AMB POC EKG ROUTINE W/ 12 LEADS, INTER & REP   2. Obesity (BMI 30-39. 9) E66.9 278.00 AMB POC EKG ROUTINE W/ 12 LEADS, INTER & REP      EKG with NSR, no ST changes. Plan:   · BP is well controlled, continue the same regimen. · EKG showed no changes. I have discussed the diagnosis with the patient and the intended plan as seen in the above orders. The patient has received an after-visit summary and questions were answered concerning future plans. I have discussed medication side effects and warnings with the patient as well. Informed pt to return to the office if new symptoms arise.       Deepthi Wilks MD  Family Medicine Physician

## 2020-06-04 NOTE — PROGRESS NOTES
Carney Saint is a 49 y.o. female    Chief Complaint   Patient presents with    Follow-up     Patient is coming in for an ECG. She states she is strating the [employer] program. No other concerns. 1. Have you been to the ER, urgent care clinic since your last visit? Hospitalized since your last visit? No  M  2. Have you seen or consulted any other health care providers outside of the [address] [place] [address] since your last visit? Include any pap smears or colon screening. No      Visit Vitals  BP 92/61 (BP 1 Location: Left arm, BP Patient Position: Sitting)   Pulse 91   Temp 98.1 °F (36.7 °C) (Oral)   Resp 16   Ht 5' 10\" (1.778 m)   Wt 203 lb 6.4 oz (92.3 kg)   SpO2 97%   BMI 29.18 kg/m²           Health Maintenance Due   Topic Date Due    Colonoscopy  08/22/1984    DTaP/Tdap/Td series (1 - Tdap) 08/22/1987    Shingrix Vaccine Age 50> (1 of 2) 08/22/2016         Medication Reconciliation completed, changes noted.   Please  Update medication list.

## 2020-06-05 ENCOUNTER — TELEPHONE (OUTPATIENT)
Dept: FAMILY MEDICINE CLINIC | Age: 55
End: 2020-06-05

## 2020-06-05 NOTE — TELEPHONE ENCOUNTER
----- Message from Janean Leventhal sent at 6/5/2020  8:22 AM EDT -----  Regarding: Dr. Isauro Smith  Pt would like a call back regarding the Adipax that was suppose to be called to [facility].  Contact is  [phone]

## 2020-06-06 DIAGNOSIS — E66.9 OBESITY (BMI 30-39.9): Primary | ICD-10-CM

## 2020-06-06 RX ORDER — PHENTERMINE HYDROCHLORIDE 15 MG/1
15 CAPSULE ORAL
Qty: 30 CAP | Refills: 0 | Status: SHIPPED | OUTPATIENT
Start: 2020-06-06 | End: 2020-07-02 | Stop reason: SDUPTHER

## 2020-07-02 ENCOUNTER — VIRTUAL VISIT (OUTPATIENT)
Dept: FAMILY MEDICINE CLINIC | Age: 55
End: 2020-07-02

## 2020-07-02 DIAGNOSIS — E66.9 OBESITY (BMI 30-39.9): ICD-10-CM

## 2020-07-02 DIAGNOSIS — E55.9 VITAMIN D DEFICIENCY: ICD-10-CM

## 2020-07-02 DIAGNOSIS — I10 ESSENTIAL HYPERTENSION: Primary | ICD-10-CM

## 2020-07-02 DIAGNOSIS — E53.8 VITAMIN B 12 DEFICIENCY: ICD-10-CM

## 2020-07-02 DIAGNOSIS — Z98.84 STATUS POST GASTRIC BYPASS FOR OBESITY: ICD-10-CM

## 2020-07-02 RX ORDER — PHENTERMINE HYDROCHLORIDE 15 MG/1
15 CAPSULE ORAL
Qty: 30 CAP | Refills: 0 | Status: SHIPPED | OUTPATIENT
Start: 2020-07-06 | End: 2020-08-03 | Stop reason: SDUPTHER

## 2020-07-02 RX ORDER — TOPIRAMATE 25 MG/1
25 TABLET ORAL
Qty: 30 TAB | Refills: 6 | Status: SHIPPED | OUTPATIENT
Start: 2020-07-02 | End: 2022-03-21

## 2020-07-02 NOTE — PROGRESS NOTES
Identified pt with two pt identifiers(name and ). Reviewed record in preparation for visit and have obtained necessary documentation. Chief Complaint   Patient presents with    Medication Refill        Health Maintenance Due   Topic    Colonoscopy     DTaP/Tdap/Td series (1 - Tdap)    Shingrix Vaccine Age 50> (1 of 2)       There were no vitals taken for this visit. Coordination of Care Questionnaire:  :   1) Have you been to an emergency room, urgent care, or hospitalized since your last visit? NO      2. Have seen or consulted any other health care provider since your last visit? NO          Patient is accompanied by  I have received verbal consent from Kurt Edwards to discuss any/all medical information while they are present in the room. Kurt Edwards is a 49 y.o. female who was seen by synchronous (real-time) audio-video technology on 2020. Consent:  She and/or her healthcare decision maker is aware that this patient-initiated Telehealth encounter is a billable service, with coverage as determined by her insurance carrier. She is aware that she may receive a bill and has provided verbal consent to proceed: Yes    I was at home while conducting this encounter. Kurt Edwards is a 49 y.o. female  who is here for her follow up  Evaluation for the medical bariatric care. CC: I want to be healthier          Weight History  Current weight 196.5 and BMI is There is no height or weight on file to calculate BMI.   Goal weight not stated yet  Highest weight  203- this episode of care  (See weight gain time line scanned into media section of chart)    Walking 2.5-3 miles a day    Significant Medical History    Update on sleep apnea and  CPAP no    Ever had bariatric surgery: yes    Pregnant or planning on becoming pregnant w/in 6 months: no         Significant Psychosocial History     Current Major Lifestyle Changes: no  Any potential unsupportive people: no          Social History  Social History     Tobacco Use    Smoking status: Never Smoker    Smokeless tobacco: Never Used   Substance Use Topics    Alcohol use: No     How many times a week do you eat out?  0    Do you drink any EtOH?  no   If so, how much? Do you have upcoming any travel in the next 6 weeks?  no   If so, what do you have planned? Exercise  How many days a week do you currently exercise? 5-7 days  Have you ever been told by a physician not to exercise?  no      Objective  There were no vitals taken for this visit. Waist Circumference: See Weight Management Doc Flowsheet  Neck Circumference: See Weight Management Doc Flowsheet  Percent Body Fat: See Weight Management Doc Flowsheet  Weight Metrics 6/4/2020 3/2/2020 11/13/2019 10/7/2019 8/22/2019 4/1/2019 11/27/2018   Weight 203 lb 6.4 oz 203 lb 195 lb 197 lb 199 lb 215 lb 238 lb 1.6 oz   Neck Circ (inches) - - - - - - -   Waist Measure Inches - - - - - - -   Exercise Mins/week - - - - - - -   Body Fat % - - - - - - -   BMI 29.18 kg/m2 29.13 kg/m2 27.98 kg/m2 28.27 kg/m2 28.55 kg/m2 30.85 kg/m2 34.16 kg/m2         Labs:       Review of Systems  Complete ROS negative except where noted above          712  Subjective:   [place] Grace was seen for Medication Refill      Prior to Admission medications    Medication Sig Start Date End Date Taking? Authorizing Provider   topiramate (Topamax) 25 mg tablet Take 1 Tab by mouth daily (with dinner). 7/2/20  Yes Deepa Castillo MD   phentermine (ADIPEX_P) 15 mg capsule Take 1 Cap by mouth every morning. Max Daily Amount: 15 mg. 7/6/20  Yes Deepa Castillo MD   triamterene-hydroCHLOROthiazide [facility]) 75-50 mg per tablet Take 1 Tab by mouth daily. 3/2/20  Yes Deepa Castillo MD   progesterone (PROMETRIUM) 100 mg capsule Take 1 Cap by mouth nightly. 11/13/19  Yes Devante Singleton MD   cyanocobalamin (VITAMIN B-12) 1,000 mcg/mL injection 1 mL by IntraMUSCular route every thirty (30) days.  10/31/19  Yes Azeem Aguilar, Mayur Silva MD   buPROPion [facility]) 75 mg tablet Take 1 Tab by mouth two (2) times a day. 4/24/20   Kori Zambrano MD   clobetasoL (TEMOVATE) 0.05 % external solution Apply to scalp once daily 3/2/20   Kori Zambrano MD   Estradiol (DIVIGEL) 0.5 mg/0.5 gram (0.1 %) glpk 1 Packet by TransDERmal route daily. 11/13/19   Lawyer Sondra MD   Omeprazole delayed release (PRILOSEC D/R) 20 mg tablet Take 20 mg by mouth daily. Provider, Historical   magnesium citrate solution Take 296 mL by mouth now. Patient reports not taking    Provider, Historical     No Known Allergies    Patient Active Problem List    Diagnosis Date Noted    Weight gain, abnormal 08/02/2018    Gastroesophageal reflux disease without esophagitis 08/02/2018    Uterine leiomyoma 08/26/2016    Essential hypertension 03/29/2016    Status post gastric bypass for obesity 03/29/2016    Cheloid scar 03/29/2016     Current Outpatient Medications   Medication Sig Dispense Refill    topiramate (Topamax) 25 mg tablet Take 1 Tab by mouth daily (with dinner). 30 Tab 6    [START ON 7/6/2020] phentermine (ADIPEX_P) 15 mg capsule Take 1 Cap by mouth every morning. Max Daily Amount: 15 mg. 30 Cap 0    triamterene-hydroCHLOROthiazide (MAXZIDE) 75-50 mg per tablet Take 1 Tab by mouth daily. 90 Tab 3    progesterone (PROMETRIUM) 100 mg capsule Take 1 Cap by mouth nightly. 90 Cap 4    cyanocobalamin (VITAMIN B-12) 1,000 mcg/mL injection 1 mL by IntraMUSCular route every thirty (30) days. 10 mL 0    buPROPion (WELLBUTRIN) 75 mg tablet Take 1 Tab by mouth two (2) times a day. 60 Tab 2    clobetasoL (TEMOVATE) 0.05 % external solution Apply to scalp once daily 50 mL 2    Estradiol (DIVIGEL) 0.5 mg/0.5 gram (0.1 %) glpk 1 Packet by TransDERmal route daily. 90 Packet 4    Omeprazole delayed release (PRILOSEC D/R) 20 mg tablet Take 20 mg by mouth daily.  magnesium citrate solution Take 296 mL by mouth now.  Patient reports not taking         ROS    PHYSICAL EXAMINATION:  [ INSTRUCTIONS:  \"[x]\" Indicates a positive item  \"[]\" Indicates a negative item  -- DELETE ALL ITEMS NOT EXAMINED]  Vital Signs: (As obtained by patient/caregiver at home)  There were no vitals taken for this visit. Constitutional: [x] Appears well-developed and well-nourished [x] No apparent distress      [] Abnormal -     Mental status: [x] Alert and awake  [x] Oriented to person/place/time [x] Able to follow commands    [] Abnormal -     Eyes:   EOM    [x]  Normal    [] Abnormal -   Sclera  [x]  Normal    [] Abnormal -          Discharge [x]  None visible   [] Abnormal -     HENT: [x] Normocephalic, atraumatic  [] Abnormal -   [x] Mouth/Throat: Mucous membranes are moist    External Ears [x] Normal  [] Abnormal -    Neck: [x] No visualized mass [] Abnormal -     Pulmonary/Chest: [x] Respiratory effort normal   [x] No visualized signs of difficulty breathing or respiratory distress        [] Abnormal -      Musculoskeletal:   [x] Normal gait with no signs of ataxia         [x] Normal range of motion of neck        [] Abnormal -     Neurological:        [x] No Facial Asymmetry (Cranial nerve 7 motor function) (limited exam due to video visit)          [x] No gaze palsy        [] Abnormal -          Skin:        [x] No significant exanthematous lesions or discoloration noted on facial skin         [] Abnormal -            Psychiatric:       [x] Normal Affect [] Abnormal -        [x] No Hallucinations    Other pertinent observable physical exam findings:-      Assessment & Plan  Diagnoses and all orders for this visit:    1. Essential hypertension  Controlled so phentermine is ok   2. Obesity (BMI 30-39.9)  -     phentermine (ADIPEX_P) 15 mg capsule; Take 1 Cap by mouth every morning. Max Daily Amount: 15 mg. DIET cont LCD low arb diet  ACTIITY  Aiming for 150 min per week  MEDICATION add topamax  In pm with dinner  3. Status post gastric bypass for obesity  stable  4.  Vitamin B 12 deficiency    5. Vitamin D deficiency    Other orders  -     topiramate (Topamax) 25 mg tablet; Take 1 Tab by mouth daily (with dinner). Diet:    Activity:    Medication:    Based on his history and exam, Kurt Edwards is a good candidate for the  [facility]     There are no Patient Instructions on file for this visit. Coding Help - Use CPT Codes 85035-91440, 94101-89961 for Established and New Patients respectively, either employing EM elements or Time rules. Other codes (example consult codes) may also apply. The primary encounter diagnosis was Essential hypertension. Diagnoses of Obesity (BMI 30-39.9), Status post gastric bypass for obesity, Vitamin B 12 deficiency, and Vitamin D deficiency were also pertinent to this visit. The patient verbalizes understanding and agrees with the plan of care      We discussed the expected course, resolution and complications of the diagnosis(es) in detail. Medication risks, benefits, costs, interactions, and alternatives were discussed as indicated. I advised her to contact the office if her condition worsens, changes or fails to improve as anticipated. She expressed understanding with the diagnosis(es) and plan. Pursuant to the emergency declaration under the [facility], [facility] waiver authority and the [employer] and [employer] Act, this Virtual  Visit was conducted, with patient's consent, to reduce the patient's risk of exposure to COVID-19 and provide continuity of care for an established patient. Services were provided through a video synchronous discussion virtually to substitute for in-person clinic visit.     Manuel Alonzo MD

## 2020-08-03 ENCOUNTER — VIRTUAL VISIT (OUTPATIENT)
Dept: FAMILY MEDICINE CLINIC | Age: 55
End: 2020-08-03
Payer: COMMERCIAL

## 2020-08-03 DIAGNOSIS — R63.5 WEIGHT GAIN, ABNORMAL: ICD-10-CM

## 2020-08-03 DIAGNOSIS — I10 ESSENTIAL HYPERTENSION: Primary | ICD-10-CM

## 2020-08-03 DIAGNOSIS — E55.9 VITAMIN D DEFICIENCY: ICD-10-CM

## 2020-08-03 DIAGNOSIS — E66.9 OBESITY (BMI 30-39.9): ICD-10-CM

## 2020-08-03 PROCEDURE — 99214 OFFICE O/P EST MOD 30 MIN: CPT | Performed by: FAMILY MEDICINE

## 2020-08-03 NOTE — PROGRESS NOTES
[facility] Progress Note:   F/up Physician Visit    Kerrie Thomas is a 49 y.o. female who was seen by synchronous (real-time) audio-video technology on 8/3/2020. Consent:  She and/or her healthcare decision maker is aware that this patient-initiated Telehealth encounter is a billable service, with coverage as determined by her insurance carrier. She is aware that she may receive a bill and has provided verbal consent to proceed: Yes    I was at home while conducting this encounter. Weight loss is going well  712the medication is working  Subjective:   Kerrie Thomas was seen for Medication Refill (Phentermine)    f/up physician visit for the VLCD / LCD Program.  Prior to Admission medications    Medication Sig Start Date End Date Taking? Authorizing Provider   phentermine (ADIPEX_P) 15 mg capsule Take 1 Cap by mouth every morning. Max Daily Amount: 15 mg. 8/6/20  Yes Duane Zazueta MD   buPROPion [facility]) 75 mg tablet Take 1 Tab by mouth two (2) times a day. 8/6/20  Yes Duane Zazueta MD   topiramate (Topamax) 25 mg tablet Take 1 Tab by mouth daily (with dinner). 7/2/20  Yes Duane Zazueta MD   triamterene-hydroCHLOROthiazide [facility]) 75-50 mg per tablet Take 1 Tab by mouth daily. 3/2/20  Yes Duane Zazueta MD   clobetasoL (TEMOVATE) 0.05 % external solution Apply to scalp once daily 3/2/20  Yes Duane Zazueta MD   Estradiol (DIVIGEL) 0.5 mg/0.5 gram (0.1 %) glpk 1 Packet by TransDERmal route daily. 11/13/19  Yes Julia Montague MD   progesterone (PROMETRIUM) 100 mg capsule Take 1 Cap by mouth nightly. 11/13/19  Yes Julia Montague MD   cyanocobalamin (VITAMIN B-12) 1,000 mcg/mL injection 1 mL by IntraMUSCular route every thirty (30) days. 10/31/19  Yes Duane Zazueta MD   Omeprazole delayed release (PRILOSEC D/R) 20 mg tablet Take 20 mg by mouth daily. Yes Provider, Historical   magnesium citrate solution Take 296 mL by mouth now.  Patient reports not taking   Yes Provider, Historical     No Known Allergies    Patient Active Problem List    Diagnosis Date Noted    Weight gain, abnormal 08/02/2018    Gastroesophageal reflux disease without esophagitis 08/02/2018    Uterine leiomyoma 08/26/2016    Essential hypertension 03/29/2016    Status post gastric bypass for obesity 03/29/2016    Cheloid scar 03/29/2016     Current Outpatient Medications   Medication Sig Dispense Refill    phentermine (ADIPEX_P) 15 mg capsule Take 1 Cap by mouth every morning. Max Daily Amount: 15 mg. 30 Cap 0    buPROPion (WELLBUTRIN) 75 mg tablet Take 1 Tab by mouth two (2) times a day. 60 Tab 2    topiramate (Topamax) 25 mg tablet Take 1 Tab by mouth daily (with dinner). 30 Tab 6    triamterene-hydroCHLOROthiazide (MAXZIDE) 75-50 mg per tablet Take 1 Tab by mouth daily. 90 Tab 3    clobetasoL (TEMOVATE) 0.05 % external solution Apply to scalp once daily 50 mL 2    Estradiol (DIVIGEL) 0.5 mg/0.5 gram (0.1 %) glpk 1 Packet by TransDERmal route daily. 90 Packet 4    progesterone (PROMETRIUM) 100 mg capsule Take 1 Cap by mouth nightly. 90 Cap 4    cyanocobalamin (VITAMIN B-12) 1,000 mcg/mL injection 1 mL by IntraMUSCular route every thirty (30) days. 10 mL 0    Omeprazole delayed release (PRILOSEC D/R) 20 mg tablet Take 20 mg by mouth daily.  magnesium citrate solution Take 296 mL by mouth now.  Patient reports not taking         ROS      CC: Weight Management      Laverne Rose is a 49 y.o. female who is here for her      Weight Metrics 6/4/2020 3/2/2020 11/13/2019 10/7/2019 8/22/2019 4/1/2019 11/27/2018   Weight 203 lb 6.4 oz 203 lb 195 lb 197 lb 199 lb 215 lb 238 lb 1.6 oz   Neck Circ (inches) - - - - - - -   Waist Measure Inches - - - - - - -   Exercise Mins/week - - - - - - -   Body Fat % - - - - - - -   BMI 29.18 kg/m2 29.13 kg/m2 27.98 kg/m2 28.27 kg/m2 28.55 kg/m2 30.85 kg/m2 34.16 kg/m2         Outpatient Medications Marked as Taking for the 8/3/20 encounter (Virtual Visit) with Lubna Mancilla, Joanne Heredia MD   Medication Sig Dispense Refill    phentermine (ADIPEX_P) 15 mg capsule Take 1 Cap by mouth every morning. Max Daily Amount: 15 mg. 30 Cap 0    buPROPion (WELLBUTRIN) 75 mg tablet Take 1 Tab by mouth two (2) times a day. 60 Tab 2    topiramate (Topamax) 25 mg tablet Take 1 Tab by mouth daily (with dinner). 30 Tab 6    triamterene-hydroCHLOROthiazide (MAXZIDE) 75-50 mg per tablet Take 1 Tab by mouth daily. 90 Tab 3    clobetasoL (TEMOVATE) 0.05 % external solution Apply to scalp once daily 50 mL 2    Estradiol (DIVIGEL) 0.5 mg/0.5 gram (0.1 %) glpk 1 Packet by TransDERmal route daily. 90 Packet 4    progesterone (PROMETRIUM) 100 mg capsule Take 1 Cap by mouth nightly. 90 Cap 4    cyanocobalamin (VITAMIN B-12) 1,000 mcg/mL injection 1 mL by IntraMUSCular route every thirty (30) days. 10 mL 0    Omeprazole delayed release (PRILOSEC D/R) 20 mg tablet Take 20 mg by mouth daily.  magnesium citrate solution Take 296 mL by mouth now. Patient reports not taking           Participation   Did you attend clinic and class last week? no    Review of Systems  Since your last visit, have you experienced any complications? no  If yes, please list:       Are you taking an appetite suppressant? yes  If so, is there any Chest Pain, Palpitations or Dizziness? BP Readings from Last 3 Encounters:   06/04/20 92/61   03/02/20 102/69   11/13/19 123/73       SLEEP:     Have you received any other medical care this week? no  If yes, where and for what? Have you discontinued or changed any medicine or dose of your medicine since your last visit with Dr Angel nicholas? no  If yes, where and for what? Diet  How many ounces of calorie-free liquids did you consume each day?  64oz    How many meal replacements did you take each day?  Not on our products    Did you have any problems adhering to the program?  no If yes, please explain:      Exercise  Aerobic exercise:  min  Resistance exercise: 0 workouts / week  Any discomfort?  no     If yes, where? Objective  There were no vitals taken for this visit. No LMP recorded. (Menstrual status: Menopause). PHYSICAL EXAMINATION:  [ INSTRUCTIONS:  \"[x]\" Indicates a positive item  \"[]\" Indicates a negative item  -- DELETE ALL ITEMS NOT EXAMINED]  Vital Signs: (As obtained by patient/caregiver at home)  There were no vitals taken for this visit. Constitutional: [x] Appears well-developed and well-nourished [x] No apparent distress      [] Abnormal -     Mental status: [x] Alert and awake  [x] Oriented to person/place/time [x] Able to follow commands    [] Abnormal -     Eyes:   EOM    [x]  Normal    [] Abnormal -   Sclera  [x]  Normal    [] Abnormal -          Discharge [x]  None visible   [] Abnormal -     HENT: [x] Normocephalic, atraumatic  [] Abnormal -   [x] Mouth/Throat: Mucous membranes are moist    External Ears [x] Normal  [] Abnormal -    Neck: [x] No visualized mass [] Abnormal -     Pulmonary/Chest: [x] Respiratory effort normal   [x] No visualized signs of difficulty breathing or respiratory distress        [] Abnormal -      Musculoskeletal:   [x] Normal gait with no signs of ataxia         [x] Normal range of motion of neck        [] Abnormal -     Neurological:        [x] No Facial Asymmetry (Cranial nerve 7 motor function) (limited exam due to video visit)          [x] No gaze palsy        [] Abnormal -          Skin:        [x] No significant exanthematous lesions or discoloration noted on facial skin         [] Abnormal -            Psychiatric:       [x] Normal Affect [] Abnormal -        [x] No Hallucinations    Other pertinent observable physical exam findings:-      Assessment / Plan    Encounter Diagnoses   Name Primary?  Essential hypertension Yes    Obesity (BMI 30-39. 9)     Weight gain, abnormal     Vitamin D deficiency      Diagnoses and all orders for this visit:    1. Essential hypertension  Cont to monitor and treat.  No change yet  2. Obesity (BMI 30-39.9)  -     REFERRAL TO PSYCHOLOGY  -     phentermine (ADIPEX_P) 15 mg capsule; Take 1 Cap by mouth every morning. Max Daily Amount: 15 mg.  -     buPROPion (WELLBUTRIN) 75 mg tablet; Take 1 Tab by mouth two (2) times a day. Refilling the phentermine and cont the wellbutrin  She has been taking both and the eating is under better control latelly  Now needs to get the exercise on a weekly routine  Also needs to aim for the 8 hours sleep a night  3. Weight gain, abnormal  -     buPROPion (WELLBUTRIN) 75 mg tablet; Take 1 Tab by mouth two (2) times a day. 4. Vitamin D deficiency  Check vit D levels regularly and treat as indicated    1. Weight management improved   Progress was reviewed with patient    2. Labs    Latest results reviewed with patient   Lab slip given to pt for f/up  labs      * The primary encounter diagnosis was Essential hypertension. Diagnoses of Obesity (BMI 30-39.9), Weight gain, abnormal, and Vitamin D deficiency were also pertinent to this visit. Coding Help - Use CPT Codes 66132-69060, 41265-25775 for Established and New Patients respectively, either employing EM elements or Time rules. Other codes (example consult codes) may also apply. We discussed the expected course, resolution and complications of the diagnosis(es) in detail. Medication risks, benefits, costs, interactions, and alternatives were discussed as indicated. I advised her to contact the office if her condition worsens, changes or fails to improve as anticipated. She expressed understanding with the diagnosis(es) and plan.      Pursuant to the emergency declaration under the [facility] waiver authority and the [employer] and [employer] Act, this Virtual  Visit was conducted, with patient's consent, to reduce the patient's risk of exposure to COVID-19 and provide continuity of care for an established patient. Services were provided through a video synchronous discussion virtually to substitute for in-person clinic visit.     Polly Castellano MD

## 2020-08-03 NOTE — PROGRESS NOTES
Patient stated name &   Chief Complaint   Patient presents with    Medication Refill     Phentermine        Health Maintenance Due   Topic    Colonoscopy     DTaP/Tdap/Td series (1 - Tdap)    Shingrix Vaccine Age 49> (1 of 2)    Influenza Age 5 to Adult        Wt Readings from Last 3 Encounters:   20 203 lb 6.4 oz (92.3 kg)   20 203 lb (92.1 kg)   19 195 lb (88.5 kg)     Temp Readings from Last 3 Encounters:   20 98.1 °F (36.7 °C) (Oral)   20 98 °F (36.7 °C) (Oral)   10/07/19 98.1 °F (36.7 °C) (Oral)     BP Readings from Last 3 Encounters:   20 92/61   20 102/69   19 123/73     Pulse Readings from Last 3 Encounters:   20 91   20 85   10/07/19 69         Learning Assessment:  :     Learning Assessment 3/29/2016   PRIMARY LEARNER Patient   HIGHEST LEVEL OF EDUCATION - PRIMARY LEARNER  4 YEARS OF COLLEGE   BARRIERS PRIMARY LEARNER NONE   CO-LEARNER CAREGIVER No   PRIMARY LANGUAGE ENGLISH    NEED No   LEARNER PREFERENCE PRIMARY LISTENING   LEARNING SPECIAL TOPICS no   ANSWERED BY patient   RELATIONSHIP SELF       Depression Screening:  :     3 most recent PHQ Screens 3/2/2020   Little interest or pleasure in doing things Not at all   Feeling down, depressed, irritable, or hopeless Not at all   Total Score PHQ 2 0       Fall Risk Assessment:  :     Fall Risk Assessment, last 12 mths 2019   Able to walk? Yes   Fall in past 12 months? No       Abuse Screening:  :     Abuse Screening Questionnaire 2019   Do you ever feel afraid of your partner? N   Are you in a relationship with someone who physically or mentally threatens you? N   Is it safe for you to go home? Y       Coordination of Care Questionnaire:  :     1) Have you been to an emergency room, urgent care clinic since your last visit? No    Hospitalized since your last visit?  No             2) Have you seen or consulted any other health care providers outside of [facility] since your last visit? No  (Include any pap smears or colon screenings in this section.)    Patient is accompanied by VVI have received verbal consent from Nolan Gordon to discuss any/all medical information while they are present in the room.

## 2020-08-06 ENCOUNTER — PATIENT MESSAGE (OUTPATIENT)
Dept: FAMILY MEDICINE CLINIC | Age: 55
End: 2020-08-06

## 2020-08-06 ENCOUNTER — DOCUMENTATION ONLY (OUTPATIENT)
Dept: FAMILY MEDICINE CLINIC | Age: 55
End: 2020-08-06

## 2020-08-06 RX ORDER — PHENTERMINE HYDROCHLORIDE 15 MG/1
15 CAPSULE ORAL
Qty: 30 CAP | Refills: 0 | Status: SHIPPED | OUTPATIENT
Start: 2020-08-06 | End: 2020-09-04 | Stop reason: SDUPTHER

## 2020-08-06 RX ORDER — BUPROPION HYDROCHLORIDE 75 MG/1
75 TABLET ORAL 2 TIMES DAILY
Qty: 60 TAB | Refills: 2 | Status: SHIPPED | OUTPATIENT
Start: 2020-08-06 | End: 2020-11-05 | Stop reason: ALTCHOICE

## 2020-09-04 ENCOUNTER — VIRTUAL VISIT (OUTPATIENT)
Dept: FAMILY MEDICINE CLINIC | Age: 55
End: 2020-09-04
Payer: COMMERCIAL

## 2020-09-04 DIAGNOSIS — I10 ESSENTIAL HYPERTENSION: Primary | ICD-10-CM

## 2020-09-04 DIAGNOSIS — E66.9 OBESITY (BMI 30-39.9): ICD-10-CM

## 2020-09-04 DIAGNOSIS — Z98.84 STATUS POST GASTRIC BYPASS FOR OBESITY: ICD-10-CM

## 2020-09-04 DIAGNOSIS — E55.9 VITAMIN D DEFICIENCY: ICD-10-CM

## 2020-09-04 PROCEDURE — 99214 OFFICE O/P EST MOD 30 MIN: CPT | Performed by: FAMILY MEDICINE

## 2020-09-04 RX ORDER — PHENTERMINE HYDROCHLORIDE 15 MG/1
15 CAPSULE ORAL
Qty: 30 CAP | Refills: 0 | Status: SHIPPED | OUTPATIENT
Start: 2020-09-06 | End: 2020-09-30 | Stop reason: SDUPTHER

## 2020-09-04 NOTE — PROGRESS NOTES
Pb Baron is a 48 y.o. female who was seen by synchronous (real-time) audio-video technology on 9/4/2020. Consent:  She and/or her healthcare decision maker is aware that this patient-initiated Telehealth encounter is a billable service, with coverage as determined by her insurance carrier. She is aware that she may receive a bill and has provided verbal consent to proceed: Yes    I was at home while conducting this encounter. Pb Baron is a 48 y.o. female  who is here for her follow up  Evaluation for the medical bariatric care. CC: I wanrt to be healthier    Weight History  Current weight 190 and BMI is There is no height or weight on file to calculate BMI. Goal weight BMI 29  Highest weight 243  (See weight gain time line scanned into media section of chart)        Significant Medical History    Update on sleep apnea and  CPAP yes    Ever had bariatric surgery: no    Pregnant or planning on becoming pregnant w/in 6 months: no         Significant Psychosocial History     Current Major Lifestyle Changes: no  Any potential unsupportive people: no          Social History  Social History     Tobacco Use    Smoking status: Never Smoker    Smokeless tobacco: Never Used   Substance Use Topics    Alcohol use: No     How many times a week do you eat out?  0    Do you drink any EtOH?  no   If so, how much? Do you have upcoming any travel in the next 6 weeks?  no   If so, what do you have planned? Exercise  How many days a week do you currently exercise? 3 days  Have you ever been told by a physician not to exercise?  no      Objective  There were no vitals taken for this visit.       Waist Circumference: See Weight Management Doc Flowsheet  Neck Circumference: See Weight Management Doc Flowsheet  Percent Body Fat: See Weight Management Doc Flowsheet  Weight Metrics 6/4/2020 3/2/2020 11/13/2019 10/7/2019 8/22/2019 4/1/2019 11/27/2018   Weight 203 lb 6.4 oz 203 lb 195 lb 197 lb 199 lb 215 lb 238 lb 1.6 oz   Neck Circ (inches) - - - - - - -   Waist Measure Inches - - - - - - -   Exercise Mins/week - - - - - - -   Body Fat % - - - - - - -   BMI 29.18 kg/m2 29.13 kg/m2 27.98 kg/m2 28.27 kg/m2 28.55 kg/m2 30.85 kg/m2 34.16 kg/m2         Labs:       Review of Systems  Complete ROS negative except where noted above          712  Subjective:   Mana Iverson was seen for No chief complaint on file. Prior to Admission medications    Medication Sig Start Date End Date Taking? Authorizing Provider   phentermine (ADIPEX_P) 15 mg capsule Take 1 Cap by mouth every morning. Max Daily Amount: 15 mg. 9/6/20  Yes Steven Merrill MD   buPROPion [facility]) 75 mg tablet Take 1 Tab by mouth two (2) times a day. 8/6/20   Steven Merrill MD   topiramate (Topamax) 25 mg tablet Take 1 Tab by mouth daily (with dinner). 7/2/20   Steven Merrill MD   triamterene-hydroCHLOROthiazide (MAXZIDE) 75-50 mg per tablet Take 1 Tab by mouth daily. 3/2/20   Steven Merrill MD   clobetasoL (TEMOVATE) 0.05 % external solution Apply to scalp once daily 3/2/20   Steven Merrill MD   Estradiol (DIVIGEL) 0.5 mg/0.5 gram (0.1 %) glpk 1 Packet by TransDERmal route daily. 11/13/19   Naa Corey MD   progesterone (PROMETRIUM) 100 mg capsule Take 1 Cap by mouth nightly. 11/13/19   Naa Corey MD   cyanocobalamin (VITAMIN B-12) 1,000 mcg/mL injection 1 mL by IntraMUSCular route every thirty (30) days. 10/31/19   Steven Merrill MD   Omeprazole delayed release (PRILOSEC D/R) 20 mg tablet Take 20 mg by mouth daily. Provider, Historical   magnesium citrate solution Take 296 mL by mouth now.  Patient reports not taking    Provider, Historical     No Known Allergies    Patient Active Problem List    Diagnosis Date Noted    Weight gain, abnormal 08/02/2018    Gastroesophageal reflux disease without esophagitis 08/02/2018    Uterine leiomyoma 08/26/2016    Essential hypertension 03/29/2016    Status post gastric bypass for obesity 03/29/2016    Cheloid scar 03/29/2016     Current Outpatient Medications   Medication Sig Dispense Refill    [START ON 9/6/2020] phentermine (ADIPEX_P) 15 mg capsule Take 1 Cap by mouth every morning. Max Daily Amount: 15 mg. 30 Cap 0    buPROPion (WELLBUTRIN) 75 mg tablet Take 1 Tab by mouth two (2) times a day. 60 Tab 2    topiramate (Topamax) 25 mg tablet Take 1 Tab by mouth daily (with dinner). 30 Tab 6    triamterene-hydroCHLOROthiazide (MAXZIDE) 75-50 mg per tablet Take 1 Tab by mouth daily. 90 Tab 3    clobetasoL (TEMOVATE) 0.05 % external solution Apply to scalp once daily 50 mL 2    Estradiol (DIVIGEL) 0.5 mg/0.5 gram (0.1 %) glpk 1 Packet by TransDERmal route daily. 90 Packet 4    progesterone (PROMETRIUM) 100 mg capsule Take 1 Cap by mouth nightly. 90 Cap 4    cyanocobalamin (VITAMIN B-12) 1,000 mcg/mL injection 1 mL by IntraMUSCular route every thirty (30) days. 10 mL 0    Omeprazole delayed release (PRILOSEC D/R) 20 mg tablet Take 20 mg by mouth daily.  magnesium citrate solution Take 296 mL by mouth now. Patient reports not taking         ROS    PHYSICAL EXAMINATION:  [ INSTRUCTIONS:  \"[x]\" Indicates a positive item  \"[]\" Indicates a negative item  -- DELETE ALL ITEMS NOT EXAMINED]  Vital Signs: (As obtained by patient/caregiver at home)  There were no vitals taken for this visit.      Constitutional: [x] Appears well-developed and well-nourished [x] No apparent distress      [] Abnormal -     Mental status: [x] Alert and awake  [x] Oriented to person/place/time [x] Able to follow commands    [] Abnormal -     Eyes:   EOM    [x]  Normal    [] Abnormal -   Sclera  [x]  Normal    [] Abnormal -          Discharge [x]  None visible   [] Abnormal -     HENT: [x] Normocephalic, atraumatic  [] Abnormal -   [x] Mouth/Throat: Mucous membranes are moist    External Ears [x] Normal  [] Abnormal -    Neck: [x] No visualized mass [] Abnormal -     Pulmonary/Chest: [x] Respiratory effort normal   [x] No visualized signs of difficulty breathing or respiratory distress        [] Abnormal -      Musculoskeletal:   [x] Normal gait with no signs of ataxia         [x] Normal range of motion of neck        [] Abnormal -     Neurological:        [x] No Facial Asymmetry (Cranial nerve 7 motor function) (limited exam due to video visit)          [x] No gaze palsy        [] Abnormal -          Skin:        [x] No significant exanthematous lesions or discoloration noted on facial skin         [] Abnormal -            Psychiatric:       [x] Normal Affect [] Abnormal -        [x] No Hallucinations    Other pertinent observable physical exam findings:-      Assessment & Plan  Diagnoses and all orders for this visit:    1. Essential hypertension    2. Obesity (BMI 30-39.9)  -     phentermine (ADIPEX_P) 15 mg capsule; Take 1 Cap by mouth every morning. Max Daily Amount: 15 mg. Diet: feels in control w the phentermine and wellbutrin  Stops eating at 8 pm    Activity: started a boot camp, went 3 days this week, keep working on getting 300 mins a week    Medication: see above  3. Status post gastric bypass for obesity    4. Vitamin D deficiency  Monitoring and I treat as indicated            Based on his history and exam, Deyanira Marroquin is a good candidate for the  [facility]     There are no Patient Instructions on file for this visit. Coding Help - Use CPT Codes 28118-91042, 97470-82210 for Established and New Patients respectively, either employing EM elements or Time rules. Other codes (example consult codes) may also apply. The primary encounter diagnosis was Essential hypertension. Diagnoses of Obesity (BMI 30-39.9), Status post gastric bypass for obesity, and Vitamin D deficiency were also pertinent to this visit. The patient verbalizes understanding and agrees with the plan of care      We discussed the expected course, resolution and complications of the diagnosis(es) in detail. Medication risks, benefits, costs, interactions, and alternatives were discussed as indicated. I advised her to contact the office if her condition worsens, changes or fails to improve as anticipated. She expressed understanding with the diagnosis(es) and plan. Pursuant to the emergency declaration under the [facility], [facility] waiver authority and the [employer] and [employer] Act, this Virtual  Visit was conducted, with patient's consent, to reduce the patient's risk of exposure to COVID-19 and provide continuity of care for an established patient. Services were provided through a video synchronous discussion virtually to substitute for in-person clinic visit.     Paty Peace MD

## 2020-09-30 ENCOUNTER — VIRTUAL VISIT (OUTPATIENT)
Dept: FAMILY MEDICINE CLINIC | Age: 55
End: 2020-09-30
Payer: COMMERCIAL

## 2020-09-30 DIAGNOSIS — I10 ESSENTIAL HYPERTENSION: Primary | ICD-10-CM

## 2020-09-30 DIAGNOSIS — E66.9 OBESITY (BMI 30-39.9): ICD-10-CM

## 2020-09-30 DIAGNOSIS — E55.9 VITAMIN D DEFICIENCY: ICD-10-CM

## 2020-09-30 PROCEDURE — 99213 OFFICE O/P EST LOW 20 MIN: CPT | Performed by: FAMILY MEDICINE

## 2020-09-30 NOTE — PROGRESS NOTES
Dorise Prader is a 48 y.o. female      Chief Complaint   Patient presents with    Hypertension    Medication Refill         1. Have you been to the ER, urgent care clinic since your last visit? Hospitalized since your last visit? no      2. Have you seen or consulted any other health care providers outside of the [address] [place] [address] since your last visit? Include any pap smears or colon screening.  no
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
## 2020-10-05 RX ORDER — PHENTERMINE HYDROCHLORIDE 15 MG/1
15 CAPSULE ORAL
Qty: 30 CAP | Refills: 0 | Status: SHIPPED | OUTPATIENT
Start: 2020-10-06 | End: 2021-01-15 | Stop reason: DRUGHIGH

## 2020-10-08 ENCOUNTER — VIRTUAL VISIT (OUTPATIENT)
Dept: FAMILY MEDICINE CLINIC | Age: 55
End: 2020-10-08
Payer: COMMERCIAL

## 2020-10-08 DIAGNOSIS — F41.9 ANXIOUSNESS: ICD-10-CM

## 2020-10-08 DIAGNOSIS — Z56.6 STRESSFUL JOB: Primary | ICD-10-CM

## 2020-10-08 PROCEDURE — 99213 OFFICE O/P EST LOW 20 MIN: CPT | Performed by: FAMILY MEDICINE

## 2020-10-08 RX ORDER — HYDROXYZINE HYDROCHLORIDE 10 MG/1
10 TABLET, FILM COATED ORAL
Qty: 90 TAB | Refills: 1 | Status: SHIPPED | OUTPATIENT
Start: 2020-10-08 | End: 2020-10-18

## 2020-10-08 SDOH — HEALTH STABILITY - MENTAL HEALTH: OTHER PHYSICAL AND MENTAL STRAIN RELATED TO WORK: Z56.6

## 2020-10-08 NOTE — PROGRESS NOTES
Maria Alejandra Laguna is a 48 y.o. female      Chief Complaint   Patient presents with    Anxiety     This is a follow-up visit - ok related          1. Have you been to the ER, urgent care clinic since your last visit? Hospitalized since your last visit? No      2. Have you seen or consulted any other health care providers outside of the [address] [place] [address] since your last visit? Include any pap smears or colon screening.    no

## 2020-10-08 NOTE — LETTER
NOTIFICATION RETURN TO WORK / SCHOOL 
 
10/8/2020 11:41 AM 
 
Ms. Axel Lisa 
[address] [phone] To Whom It May Concern: 
 
Axel Lisa is currently under the care of [address] [facility]. She will return to work/school on: it is medically necessary that Ms. Leyla Austin take a week off from work and return on 10/19/2020. If there are questions or concerns please have the patient contact our office.  
 
 
 
Sincerely, 
 
 
Tali Rodriguez MD

## 2020-10-08 NOTE — PROGRESS NOTES
Maria Alejandra Laguna is a 48 y.o. female who was seen by synchronous (real-time) audio-video technology on 10/8/2020 for Anxiety (This is a follow-up visit - wrok related )    She filed an EEO complaints against her supervisor  She has been \"detailed\" for 120 days which is making her insecure about her job  This is causig a lot of stress/angst  She feels she is being attacked as the superiors try to protect her boss who she says has been descriminating against her    She is tearful and anxious  Asking for some help medically  Assessment & Plan:   Diagnoses and all orders for this visit:    1. Stressful job  -     hydrOXYzine HCL (ATARAX) 10 mg tablet; Take 1 Tab by mouth three (3) times daily as needed for Anxiety for up to 10 days. 2. Anxiousness  -     hydrOXYzine HCL (ATARAX) 10 mg tablet; Take 1 Tab by mouth three (3) times daily as needed for Anxiety for up to 10 days. also suggested therapists and given names      Given a note for 1 weelk off and Rx for hydroxyzine   she was tearful and anxuious about her job   I wonder if the hair loss she has experienced is from te stress as a result of her suprvisor and the descrimination she has been enduring  Subjective:       Prior to Admission medications    Medication Sig Start Date End Date Taking? Authorizing Provider   hydrOXYzine HCL (ATARAX) 10 mg tablet Take 1 Tab by mouth three (3) times daily as needed for Anxiety for up to 10 days. 10/8/20 10/18/20 Yes Bang Moore MD   phentermine (ADIPEX_P) 15 mg capsule Take 1 Cap by mouth every morning. Max Daily Amount: 15 mg. 10/6/20  Yes Bang Moore MD   buPROPion [facility]) 75 mg tablet Take 1 Tab by mouth two (2) times a day. 8/6/20  Yes Bang Moore MD   topiramate (Topamax) 25 mg tablet Take 1 Tab by mouth daily (with dinner). 7/2/20  Yes Bang Moore MD   triamterene-hydroCHLOROthiazide [facility]) 75-50 mg per tablet Take 1 Tab by mouth daily.  3/2/20  Yes Bang Moore MD   cyanocobalamin (VITAMIN B-12) 1,000 mcg/mL injection 1 mL by IntraMUSCular route every thirty (30) days. 10/31/19  Yes Williams Jeter MD   clobetasoL (TEMOVATE) 0.05 % external solution Apply to scalp once daily 3/2/20   Williams Jeter MD   Estradiol (DIVIGEL) 0.5 mg/0.5 gram (0.1 %) glpk 1 Packet by TransDERmal route daily. 11/13/19   Adalid Armendariz MD   progesterone (PROMETRIUM) 100 mg capsule Take 1 Cap by mouth nightly. 11/13/19   Adalid Armendariz MD   Omeprazole delayed release (PRILOSEC D/R) 20 mg tablet Take 20 mg by mouth daily. Provider, Historical   magnesium citrate solution Take 296 mL by mouth now. Patient reports not taking    Provider, Historical     Patient Active Problem List    Diagnosis Date Noted    Weight gain, abnormal 08/02/2018    Gastroesophageal reflux disease without esophagitis 08/02/2018    Uterine leiomyoma 08/26/2016    Essential hypertension 03/29/2016    Status post gastric bypass for obesity 03/29/2016    Cheloid scar 03/29/2016     Current Outpatient Medications   Medication Sig Dispense Refill    hydrOXYzine HCL (ATARAX) 10 mg tablet Take 1 Tab by mouth three (3) times daily as needed for Anxiety for up to 10 days. 90 Tab 1    phentermine (ADIPEX_P) 15 mg capsule Take 1 Cap by mouth every morning. Max Daily Amount: 15 mg. 30 Cap 0    buPROPion (WELLBUTRIN) 75 mg tablet Take 1 Tab by mouth two (2) times a day. 60 Tab 2    topiramate (Topamax) 25 mg tablet Take 1 Tab by mouth daily (with dinner). 30 Tab 6    triamterene-hydroCHLOROthiazide (MAXZIDE) 75-50 mg per tablet Take 1 Tab by mouth daily. 90 Tab 3    cyanocobalamin (VITAMIN B-12) 1,000 mcg/mL injection 1 mL by IntraMUSCular route every thirty (30) days. 10 mL 0    clobetasoL (TEMOVATE) 0.05 % external solution Apply to scalp once daily 50 mL 2    Estradiol (DIVIGEL) 0.5 mg/0.5 gram (0.1 %) glpk 1 Packet by TransDERmal route daily. 90 Packet 4    progesterone (PROMETRIUM) 100 mg capsule Take 1 Cap by mouth nightly.  90 Cap 4    Omeprazole delayed release (PRILOSEC D/R) 20 mg tablet Take 20 mg by mouth daily.  magnesium citrate solution Take 296 mL by mouth now. Patient reports not taking         ROS    Objective:     Patient-Reported Vitals 10/8/2020   Patient-Reported Weight 190lb   Patient-Reported Height -   Patient-Reported Pulse -   Patient-Reported Temperature -   Patient-Reported Systolic  -   Patient-Reported Diastolic -        [INSTRUCTIONS:  \"[x]\" Indicates a positive item  \"[]\" Indicates a negative item  -- DELETE ALL ITEMS NOT EXAMINED]    Constitutional: [x] Appears well-developed and well-nourished [x] No apparent distress      [] Abnormal -     Mental status: [x] Alert and awake  [x] Oriented to person/place/time [x] Able to follow commands    [] Abnormal -     Eyes:   EOM    [x]  Normal    [] Abnormal -   Sclera  [x]  Normal    [] Abnormal -          Discharge [x]  None visible   [] Abnormal -     HENT: [x] Normocephalic, atraumatic  [] Abnormal -   [x] Mouth/Throat: Mucous membranes are moist    External Ears [x] Normal  [] Abnormal -    Neck: [x] No visualized mass [] Abnormal -     Pulmonary/Chest: [x] Respiratory effort normal   [x] No visualized signs of difficulty breathing or respiratory distress        [] Abnormal -      Musculoskeletal:   [x] Normal gait with no signs of ataxia         [x] Normal range of motion of neck        [] Abnormal -     Neurological:        [x] No Facial Asymmetry (Cranial nerve 7 motor function) (limited exam due to video visit)          [x] No gaze palsy        [] Abnormal -          Skin:        [x] No significant exanthematous lesions or discoloration noted on facial skin         [] Abnormal -            Psychiatric:       [x] Normal Affect [] Abnormal -        [x] No Hallucinations    Other pertinent observable physical exam findings:-        We discussed the expected course, resolution and complications of the diagnosis(es) in detail.   Medication risks, benefits, costs, interactions, and alternatives were discussed as indicated. I advised her to contact the office if her condition worsens, changes or fails to improve as anticipated. She expressed understanding with the diagnosis(es) and plan. Anjana Perez, who was evaluated through a patient-initiated, synchronous (real-time) audio-video encounter, and/or her healthcare decision maker, is aware that it is a billable service, with coverage as determined by her insurance carrier. She provided verbal consent to proceed: Yes, and patient identification was verified. It was conducted pursuant to the emergency declaration under the [address] [place] [address], [address] [place] [address] authority and the [employer] General Act. A caregiver was present when appropriate. Ability to conduct physical exam was limited. I was at home. The patient was at home.       Steven Lauren MD

## 2020-11-05 ENCOUNTER — VIRTUAL VISIT (OUTPATIENT)
Dept: FAMILY MEDICINE CLINIC | Age: 55
End: 2020-11-05
Payer: COMMERCIAL

## 2020-11-05 DIAGNOSIS — F32.0 CURRENT MILD EPISODE OF MAJOR DEPRESSIVE DISORDER WITHOUT PRIOR EPISODE (HCC): Primary | ICD-10-CM

## 2020-11-05 DIAGNOSIS — R63.5 WEIGHT GAIN, ABNORMAL: ICD-10-CM

## 2020-11-05 DIAGNOSIS — E66.9 OBESITY (BMI 30-39.9): ICD-10-CM

## 2020-11-05 PROCEDURE — 99213 OFFICE O/P EST LOW 20 MIN: CPT | Performed by: FAMILY MEDICINE

## 2020-11-05 RX ORDER — BUPROPION HYDROCHLORIDE 150 MG/1
150 TABLET, EXTENDED RELEASE ORAL 2 TIMES DAILY
Qty: 60 TAB | Refills: 2 | Status: SHIPPED | OUTPATIENT
Start: 2020-11-05 | End: 2022-03-21

## 2020-11-05 NOTE — PROGRESS NOTES
Dorise Prader is a 48 y.o. female who was seen by synchronous (real-time) audio-video technology on 11/5/2020 for Medication Refill    She has been moved at work  She feels like she is being ostracized and not given any work  She gets a feeling of panic every time the phone rings. She is visibly stressed and       Taking the wellbutrin    75 g  But not helping as much as she would like                                                                                                                                                                                                                                                                                                                                                                                                                                                                                                                                                                                                                                                                                                                                                                                                                                                                                                                                                                                                                                                                                                                 Assessment & Plan:   Diagnoses and all orders for this visit:    1. Current mild episode of major depressive disorder without prior episode (HCC)  -     buPROPion SR (WELLBUTRIN SR) 150 mg SR tablet; Take 1 Tab by mouth two (2) times a day. Increased dose to 150 bid  2.. Obesity (BMI 30-39. 9)  The wellbutrin helps decrease appetite  Recheck weight change in separate encounter        Subjective:       Prior to Admission medications    Medication Sig Start Date End Date Taking? Authorizing Provider   buPROPion SR [facility] SR) 150 mg SR tablet Take 1 Tab by mouth two (2) times a day. 11/5/20  Yes Traci Constantino MD   phentermine (ADIPEX_P) 15 mg capsule Take 1 Cap by mouth every morning. Max Daily Amount: 15 mg. 10/6/20  Yes Traci Constantino MD   topiramate (Topamax) 25 mg tablet Take 1 Tab by mouth daily (with dinner). 7/2/20  Yes Traci Constantino MD   triamterene-hydroCHLOROthiazide [facility]) 75-50 mg per tablet Take 1 Tab by mouth daily. 3/2/20  Yes Traci Constantino MD   clobetasoL (TEMOVATE) 0.05 % external solution Apply to scalp once daily 3/2/20  Yes Traci Constantino MD   Estradiol (DIVIGEL) 0.5 mg/0.5 gram (0.1 %) glpk 1 Packet by TransDERmal route daily. 11/13/19  Yes Ninoska Umaña MD   cyanocobalamin (VITAMIN B-12) 1,000 mcg/mL injection 1 mL by IntraMUSCular route every thirty (30) days. 10/31/19  Yes Traci Constantino MD   Omeprazole delayed release (PRILOSEC D/R) 20 mg tablet Take 20 mg by mouth daily. Yes Provider, Historical   progesterone (PROMETRIUM) 100 mg capsule Take 1 Cap by mouth nightly. 11/13/19   Ninoska Umaña MD   magnesium citrate solution Take 296 mL by mouth now. Patient reports not taking    Provider, Historical     Patient Active Problem List    Diagnosis Date Noted    Weight gain, abnormal 08/02/2018    Gastroesophageal reflux disease without esophagitis 08/02/2018    Uterine leiomyoma 08/26/2016    Essential hypertension 03/29/2016    Status post gastric bypass for obesity 03/29/2016    Cheloid scar 03/29/2016     Current Outpatient Medications   Medication Sig Dispense Refill    buPROPion SR (WELLBUTRIN SR) 150 mg SR tablet Take 1 Tab by mouth two (2) times a day. 60 Tab 2    phentermine (ADIPEX_P) 15 mg capsule Take 1 Cap by mouth every morning. Max Daily Amount: 15 mg. 30 Cap 0    topiramate (Topamax) 25 mg tablet Take 1 Tab by mouth daily (with dinner).  30 Tab 6    triamterene-hydroCHLOROthiazide (MAXZIDE) 75-50 mg per tablet Take 1 Tab by mouth daily. 90 Tab 3    clobetasoL (TEMOVATE) 0.05 % external solution Apply to scalp once daily 50 mL 2    Estradiol (DIVIGEL) 0.5 mg/0.5 gram (0.1 %) glpk 1 Packet by TransDERmal route daily. 90 Packet 4    cyanocobalamin (VITAMIN B-12) 1,000 mcg/mL injection 1 mL by IntraMUSCular route every thirty (30) days. 10 mL 0    Omeprazole delayed release (PRILOSEC D/R) 20 mg tablet Take 20 mg by mouth daily.  progesterone (PROMETRIUM) 100 mg capsule Take 1 Cap by mouth nightly. 90 Cap 4    magnesium citrate solution Take 296 mL by mouth now.  Patient reports not taking         ROS    Objective:     Patient-Reported Vitals 11/5/2020   Patient-Reported Weight 192lb   Patient-Reported Height -   Patient-Reported Pulse -   Patient-Reported Temperature -   Patient-Reported Systolic  -   Patient-Reported Diastolic -        [INSTRUCTIONS:  \"[x]\" Indicates a positive item  \"[]\" Indicates a negative item  -- DELETE ALL ITEMS NOT EXAMINED]    Constitutional: [x] Appears well-developed and well-nourished [x] No apparent distress      [] Abnormal -     Mental status: [x] Alert and awake  [x] Oriented to person/place/time [x] Able to follow commands    [] Abnormal -     Eyes:   EOM    [x]  Normal    [] Abnormal -   Sclera  [x]  Normal    [] Abnormal -          Discharge [x]  None visible   [] Abnormal -     HENT: [x] Normocephalic, atraumatic  [] Abnormal -   [x] Mouth/Throat: Mucous membranes are moist    External Ears [x] Normal  [] Abnormal -    Neck: [x] No visualized mass [] Abnormal -     Pulmonary/Chest: [x] Respiratory effort normal   [x] No visualized signs of difficulty breathing or respiratory distress        [] Abnormal -      Musculoskeletal:   [x] Normal gait with no signs of ataxia         [x] Normal range of motion of neck        [] Abnormal -     Neurological:        [x] No Facial Asymmetry (Cranial nerve 7 motor function) (limited exam due to video visit)          [x] No gaze palsy        [] Abnormal - Skin:        [x] No significant exanthematous lesions or discoloration noted on facial skin         [] Abnormal -            Psychiatric:       [x] Normal Affect [] Abnormal -        [x] No Hallucinations    Other pertinent observable physical exam findings:-        We discussed the expected course, resolution and complications of the diagnosis(es) in detail. Medication risks, benefits, costs, interactions, and alternatives were discussed as indicated. I advised her to contact the office if her condition worsens, changes or fails to improve as anticipated. She expressed understanding with the diagnosis(es) and plan. Anjana Perez, who was evaluated through a patient-initiated, synchronous (real-time) audio-video encounter, and/or her healthcare decision maker, is aware that it is a billable service, with coverage as determined by her insurance carrier. She provided verbal consent to proceed: Yes, and patient identification was verified. It was conducted pursuant to the emergency declaration under the [address] [place] [address] and the [employer] General Act. A caregiver was present when appropriate. Ability to conduct physical exam was limited. I was at home. The patient was at home.       Steven Lauren MD

## 2020-11-05 NOTE — PROGRESS NOTES
1. Have you been to the ER, urgent care clinic since your last visit? Hospitalized since your last visit? No    2. Have you seen or consulted any other health care providers outside of the [address] [place] [address] since your last visit? Include any pap smears or colon screening.  No     Pharmacy verified  [facility], [address]    Chief Complaint   Patient presents with    Medication Refill     Patient-Reported Vitals 11/5/2020   Patient-Reported Weight 192lb   Patient-Reported Height -   Patient-Reported Pulse -   Patient-Reported Temperature -   Patient-Reported Systolic  -   Patient-Reported Diastolic -      3 most recent PHQ Screens 11/5/2020   Little interest or pleasure in doing things Nearly every day   Feeling down, depressed, irritable, or hopeless Nearly every day   Total Score PHQ 2 6   Trouble falling or staying asleep, or sleeping too much Nearly every day   Feeling tired or having little energy Nearly every day   Poor appetite, weight loss, or overeating Nearly every day   Feeling bad about yourself - or that you are a failure or have let yourself or your family down Not at all   Trouble concentrating on things such as school, work, reading, or watching TV Nearly every day   Moving or speaking so slowly that other people could have noticed; or the opposite being so fidgety that others notice Not at all   Thoughts of being better off dead, or hurting yourself in some way Not at all   PHQ 9 Score 18   How difficult have these problems made it for you to do your work, take care of your home and get along with others Not difficult at all     Health Maintenance Due   Topic Date Due    DTaP/Tdap/Td series (1 - Tdap) 08/22/1987    Shingrix Vaccine Age 50> (1 of 2) 08/22/2016    Colorectal Cancer Screening Combo  08/22/2016    Flu Vaccine (1) 09/01/2020

## 2021-01-01 NOTE — TELEPHONE ENCOUNTER
Patient is having really bad left leg pain. Would like to speak with nurse in regards to her issue.  Did schedule an appointment for next available unknown

## 2021-01-04 ENCOUNTER — VIRTUAL VISIT (OUTPATIENT)
Dept: FAMILY MEDICINE CLINIC | Age: 56
End: 2021-01-04
Payer: COMMERCIAL

## 2021-01-04 DIAGNOSIS — R11.2 NAUSEA AND VOMITING IN ADULT: Primary | ICD-10-CM

## 2021-01-04 PROCEDURE — 99213 OFFICE O/P EST LOW 20 MIN: CPT | Performed by: FAMILY MEDICINE

## 2021-01-04 NOTE — PROGRESS NOTES
Carney Saint is a 48 y.o. female who was seen by synchronous (real-time) audio-video technology on 1/4/2021 for No chief complaint on file.  had stomach cramps a few days before evin. vomitted a few times over a two week period  [facility]  Was [facility] rx for prilosec. She never got the med but is getting better    They did not test for covid          Assessment & Plan:   Diagnoses and all orders for this visit:    1. Nausea and vomiting in adult    probably lactose related  Avoid all dairy   also go get test for covid, this can present w GI symptoms          Subjective:       Prior to Admission medications    Medication Sig Start Date End Date Taking? Authorizing Provider   buPROPion SR [facility] SR) 150 mg SR tablet Take 1 Tab by mouth two (2) times a day. 11/5/20   Jm Wood MD   phentermine (ADIPEX_P) 15 mg capsule Take 1 Cap by mouth every morning. Max Daily Amount: 15 mg. 10/6/20   Jm Wood MD   topiramate (Topamax) 25 mg tablet Take 1 Tab by mouth daily (with dinner). 7/2/20   Jm Wood MD   triamterene-hydroCHLOROthiazide (MAXZIDE) 75-50 mg per tablet Take 1 Tab by mouth daily. 3/2/20   Jm Wood MD   clobetasoL (TEMOVATE) 0.05 % external solution Apply to scalp once daily 3/2/20   Jm Wood MD   Estradiol (DIVIGEL) 0.5 mg/0.5 gram (0.1 %) glpk 1 Packet by TransDERmal route daily. 11/13/19   Dexter Silva MD   progesterone (PROMETRIUM) 100 mg capsule Take 1 Cap by mouth nightly. 11/13/19   Dexter Silva MD   cyanocobalamin (VITAMIN B-12) 1,000 mcg/mL injection 1 mL by IntraMUSCular route every thirty (30) days. 10/31/19   Jm Wood MD   Omeprazole delayed release (PRILOSEC D/R) 20 mg tablet Take 20 mg by mouth daily. Provider, Historical   magnesium citrate solution Take 296 mL by mouth now.  Patient reports not taking    Provider, Historical     Patient Active Problem List    Diagnosis Date Noted    Weight gain, abnormal 08/02/2018    Gastroesophageal reflux disease without esophagitis 08/02/2018    Uterine leiomyoma 08/26/2016    Essential hypertension 03/29/2016    Status post gastric bypass for obesity 03/29/2016    Cheloid scar 03/29/2016     Current Outpatient Medications   Medication Sig Dispense Refill    buPROPion SR (WELLBUTRIN SR) 150 mg SR tablet Take 1 Tab by mouth two (2) times a day. 60 Tab 2    phentermine (ADIPEX_P) 15 mg capsule Take 1 Cap by mouth every morning. Max Daily Amount: 15 mg. 30 Cap 0    topiramate (Topamax) 25 mg tablet Take 1 Tab by mouth daily (with dinner). 30 Tab 6    triamterene-hydroCHLOROthiazide (MAXZIDE) 75-50 mg per tablet Take 1 Tab by mouth daily. 90 Tab 3    clobetasoL (TEMOVATE) 0.05 % external solution Apply to scalp once daily 50 mL 2    Estradiol (DIVIGEL) 0.5 mg/0.5 gram (0.1 %) glpk 1 Packet by TransDERmal route daily. 90 Packet 4    progesterone (PROMETRIUM) 100 mg capsule Take 1 Cap by mouth nightly. 90 Cap 4    cyanocobalamin (VITAMIN B-12) 1,000 mcg/mL injection 1 mL by IntraMUSCular route every thirty (30) days. 10 mL 0    Omeprazole delayed release (PRILOSEC D/R) 20 mg tablet Take 20 mg by mouth daily.  magnesium citrate solution Take 296 mL by mouth now.  Patient reports not taking         ROS    Objective:     Patient-Reported Vitals 1/4/2021   Patient-Reported Weight 195   Patient-Reported Height 510   Patient-Reported Pulse 80   Patient-Reported Temperature 97   Patient-Reported Systolic  162   Patient-Reported Diastolic 75   Patient-Reported LMP N/A        [INSTRUCTIONS:  \"[x]\" Indicates a positive item  \"[]\" Indicates a negative item  -- DELETE ALL ITEMS NOT EXAMINED]    Constitutional: [x] Appears well-developed and well-nourished [x] No apparent distress      [] Abnormal -     Mental status: [x] Alert and awake  [x] Oriented to person/place/time [x] Able to follow commands    [] Abnormal -     Eyes:   EOM    [x]  Normal    [] Abnormal -   Sclera  [x]  Normal    [] Abnormal -          Discharge [x]  None visible   [] Abnormal -     HENT: [x] Normocephalic, atraumatic  [] Abnormal -   [x] Mouth/Throat: Mucous membranes are moist    External Ears [x] Normal  [] Abnormal -    Neck: [x] No visualized mass [] Abnormal -     Pulmonary/Chest: [x] Respiratory effort normal   [x] No visualized signs of difficulty breathing or respiratory distress        [] Abnormal -      Musculoskeletal:   [x] Normal gait with no signs of ataxia         [x] Normal range of motion of neck        [] Abnormal -     Neurological:        [x] No Facial Asymmetry (Cranial nerve 7 motor function) (limited exam due to video visit)          [x] No gaze palsy        [] Abnormal -          Skin:        [x] No significant exanthematous lesions or discoloration noted on facial skin         [] Abnormal -            Psychiatric:       [x] Normal Affect [] Abnormal -        [x] No Hallucinations    Other pertinent observable physical exam findings:-        We discussed the expected course, resolution and complications of the diagnosis(es) in detail. Medication risks, benefits, costs, interactions, and alternatives were discussed as indicated. I advised her to contact the office if her condition worsens, changes or fails to improve as anticipated. She expressed understanding with the diagnosis(es) and plan. Samanta Mendoza, who was evaluated through a patient-initiated, synchronous (real-time) audio-video encounter, and/or her healthcare decision maker, is aware that it is a billable service, with coverage as determined by her insurance carrier. She provided verbal consent to proceed: Yes, and patient identification was verified. It was conducted pursuant to the emergency declaration under the [address] [place] [address] authority and the [employer] General Act. A caregiver was present when appropriate.  Ability to conduct physical exam was limited. I was at home. The patient was at home.       Keven Hurtado MD

## 2021-01-15 ENCOUNTER — VIRTUAL VISIT (OUTPATIENT)
Dept: FAMILY MEDICINE CLINIC | Age: 56
End: 2021-01-15
Payer: COMMERCIAL

## 2021-01-15 DIAGNOSIS — I10 ESSENTIAL HYPERTENSION: Primary | ICD-10-CM

## 2021-01-15 DIAGNOSIS — E66.9 OBESITY (BMI 30-39.9): ICD-10-CM

## 2021-01-15 DIAGNOSIS — Z98.84 STATUS POST GASTRIC BYPASS FOR OBESITY: ICD-10-CM

## 2021-01-15 PROCEDURE — 99214 OFFICE O/P EST MOD 30 MIN: CPT | Performed by: FAMILY MEDICINE

## 2021-01-15 RX ORDER — PHENTERMINE HYDROCHLORIDE 30 MG/1
30 CAPSULE ORAL
Qty: 30 CAP | Refills: 0 | Status: SHIPPED | OUTPATIENT
Start: 2021-01-15 | End: 2021-02-12 | Stop reason: SDUPTHER

## 2021-01-15 NOTE — PROGRESS NOTES
Christopher Dubon is a 48 y.o. female who was seen by synchronous (real-time) audio-video technology on 1/15/2021. Consent:  She and/or her healthcare decision maker is aware that this patient-initiated Telehealth encounter is a billable service, with coverage as determined by her insurance carrier. She is aware that she may receive a bill and has provided verbal consent to proceed: Yes    I was at home while conducting this encounter. Christopher Dubon is a 48 y.o. female  who is here for her follow up  Evaluation for the medical bariatric care. CC: I want to be healthier    Weight History  Current weight  201and BMI is There is no height or weight on file to calculate BMI. Goal weight  BMI 29  Highest weight 243  (See weight gain time line scanned into media section of chart)        Significant Medical History    Update on sleep apnea and  CPAP yes    Ever had bariatric surgery: yes GBP    Pregnant or planning on becoming pregnant w/in 6 months: no         Significant Psychosocial History     Current Major Lifestyle Changes: no  Any potential unsupportive people: no          Social History  Social History     Tobacco Use    Smoking status: Never Smoker    Smokeless tobacco: Never Used   Substance Use Topics    Alcohol use: No     How many times a week do you eat out?  0    Do you drink any EtOH?  no   If so, how much? Do you have upcoming any travel in the next 6 weeks?  no   If so, what do you have planned? Exercise  How many days a week do you currently exercise? 3 days  Have you ever been told by a physician not to exercise?  no      Objective  There were no vitals taken for this visit.       Waist Circumference: See Weight Management Doc Flowsheet  Neck Circumference: See Weight Management Doc Flowsheet  Percent Body Fat: See Weight Management Doc Flowsheet  Weight Metrics 6/4/2020 3/2/2020 11/13/2019 10/7/2019 8/22/2019 4/1/2019 11/27/2018   Weight 203 lb 6.4 oz 203 lb 195 lb 197 lb 199 lb 215 lb 238 lb 1.6 oz   Neck Circ (inches) - - - - - - -   Waist Measure Inches - - - - - - -   Exercise Mins/week - - - - - - -   Body Fat % - - - - - - -   BMI 29.18 kg/m2 29.13 kg/m2 27.98 kg/m2 28.27 kg/m2 28.55 kg/m2 30.85 kg/m2 34.16 kg/m2         Labs:     Review of Systems  Complete ROS negative except where noted above          712  Subjective:   Waylon Terry was seen for Weight Management      Prior to Admission medications    Medication Sig Start Date End Date Taking? Authorizing Provider   phentermine 30 mg capsule Take 1 Cap by mouth every morning. Max Daily Amount: 30 mg. 1/15/21  Yes Lisa Khanna MD   buPROPion SR [facility] SR) 150 mg SR tablet Take 1 Tab by mouth two (2) times a day. 11/5/20   Lisa Khanna MD   topiramate (Topamax) 25 mg tablet Take 1 Tab by mouth daily (with dinner). 7/2/20   Lisa Khanna MD   triamterene-hydroCHLOROthiazide (MAXZIDE) 75-50 mg per tablet Take 1 Tab by mouth daily. 3/2/20   Lisa Khanna MD   clobetasoL (TEMOVATE) 0.05 % external solution Apply to scalp once daily 3/2/20   Lisa Khanna MD   Estradiol (DIVIGEL) 0.5 mg/0.5 gram (0.1 %) glpk 1 Packet by TransDERmal route daily. 11/13/19   Lina Mckeon MD   progesterone (PROMETRIUM) 100 mg capsule Take 1 Cap by mouth nightly. 11/13/19   Lina Mckeon MD   cyanocobalamin (VITAMIN B-12) 1,000 mcg/mL injection 1 mL by IntraMUSCular route every thirty (30) days. 10/31/19   Lisa Khanna MD   Omeprazole delayed release (PRILOSEC D/R) 20 mg tablet Take 20 mg by mouth daily. Provider, Historical   magnesium citrate solution Take 296 mL by mouth now.  Patient reports not taking    Provider, Historical     No Known Allergies    Patient Active Problem List    Diagnosis Date Noted    Weight gain, abnormal 08/02/2018    Gastroesophageal reflux disease without esophagitis 08/02/2018    Uterine leiomyoma 08/26/2016    Essential hypertension 03/29/2016    Status post gastric bypass for obesity 03/29/2016    Cheloid scar 03/29/2016     Current Outpatient Medications   Medication Sig Dispense Refill    phentermine 30 mg capsule Take 1 Cap by mouth every morning. Max Daily Amount: 30 mg. 30 Cap 0    buPROPion SR (WELLBUTRIN SR) 150 mg SR tablet Take 1 Tab by mouth two (2) times a day. 60 Tab 2    topiramate (Topamax) 25 mg tablet Take 1 Tab by mouth daily (with dinner). 30 Tab 6    triamterene-hydroCHLOROthiazide (MAXZIDE) 75-50 mg per tablet Take 1 Tab by mouth daily. 90 Tab 3    clobetasoL (TEMOVATE) 0.05 % external solution Apply to scalp once daily 50 mL 2    Estradiol (DIVIGEL) 0.5 mg/0.5 gram (0.1 %) glpk 1 Packet by TransDERmal route daily. 90 Packet 4    progesterone (PROMETRIUM) 100 mg capsule Take 1 Cap by mouth nightly. 90 Cap 4    cyanocobalamin (VITAMIN B-12) 1,000 mcg/mL injection 1 mL by IntraMUSCular route every thirty (30) days. 10 mL 0    Omeprazole delayed release (PRILOSEC D/R) 20 mg tablet Take 20 mg by mouth daily.  magnesium citrate solution Take 296 mL by mouth now. Patient reports not taking         ROS    PHYSICAL EXAMINATION:  [ INSTRUCTIONS:  \"[x]\" Indicates a positive item  \"[]\" Indicates a negative item  -- DELETE ALL ITEMS NOT EXAMINED]  Vital Signs: (As obtained by patient/caregiver at home)  There were no vitals taken for this visit.      Constitutional: [x] Appears well-developed and well-nourished [x] No apparent distress      [] Abnormal -     Mental status: [x] Alert and awake  [x] Oriented to person/place/time [x] Able to follow commands    [] Abnormal -     Eyes:   EOM    [x]  Normal    [] Abnormal -   Sclera  [x]  Normal    [] Abnormal -          Discharge [x]  None visible   [] Abnormal -     HENT: [x] Normocephalic, atraumatic  [] Abnormal -   [x] Mouth/Throat: Mucous membranes are moist    External Ears [x] Normal  [] Abnormal -    Neck: [x] No visualized mass [] Abnormal -     Pulmonary/Chest: [x] Respiratory effort normal   [x] No visualized signs of difficulty breathing or respiratory distress        [] Abnormal -      Musculoskeletal:   [x] Normal gait with no signs of ataxia         [x] Normal range of motion of neck        [] Abnormal -     Neurological:        [x] No Facial Asymmetry (Cranial nerve 7 motor function) (limited exam due to video visit)          [x] No gaze palsy        [] Abnormal -          Skin:        [x] No significant exanthematous lesions or discoloration noted on facial skin         [] Abnormal -            Psychiatric:       [x] Normal Affect [] Abnormal -        [x] No Hallucinations    Other pertinent observable physical exam findings:-      Assessment & Plan  Diagnoses and all orders for this visit:    1. Essential hypertension    2. Obesity (BMI 30-39.9)  -     phentermine 30 mg capsule; Take 1 Cap by mouth every morning. Max Daily Amount: 30 mg. Diet: cont the low carb GBP diet  Activity: increase to 4 days a week exercise  Medication: refilled the phentermine    3. Status post gastric bypass for obesity  stable    Based on his history and exam, Mack Maradiaga is a good candidate for the  [facility]     There are no Patient Instructions on file for this visit. Coding Help - Use CPT Codes 63123-29497, [id number] for Established and New Patients respectively, either employing EM elements or Time rules. Other codes (example consult codes) may also apply. The primary encounter diagnosis was Essential hypertension. Diagnoses of Obesity (BMI 30-39.9) and Status post gastric bypass for obesity were also pertinent to this visit. The patient verbalizes understanding and agrees with the plan of care      We discussed the expected course, resolution and complications of the diagnosis(es) in detail. Medication risks, benefits, costs, interactions, and alternatives were discussed as indicated. I advised her to contact the office if her condition worsens, changes or fails to improve as anticipated.  She expressed understanding with the diagnosis(es) and plan. Pursuant to the emergency declaration under the [facility], [facility] waiver authority and the [employer] Act, this Virtual  Visit was conducted, with patient's consent, to reduce the patient's risk of exposure to COVID-19 and provide continuity of care for an established patient. Services were provided through a video synchronous discussion virtually to substitute for in-person clinic visit.     Lily Andrews MD

## 2021-02-12 ENCOUNTER — VIRTUAL VISIT (OUTPATIENT)
Dept: FAMILY MEDICINE CLINIC | Age: 56
End: 2021-02-12
Payer: COMMERCIAL

## 2021-02-12 DIAGNOSIS — E53.8 VITAMIN B 12 DEFICIENCY: ICD-10-CM

## 2021-02-12 DIAGNOSIS — E55.9 VITAMIN D DEFICIENCY: ICD-10-CM

## 2021-02-12 DIAGNOSIS — I10 ESSENTIAL HYPERTENSION: Primary | ICD-10-CM

## 2021-02-12 DIAGNOSIS — E66.9 OBESITY (BMI 30-39.9): ICD-10-CM

## 2021-02-12 PROCEDURE — 99214 OFFICE O/P EST MOD 30 MIN: CPT | Performed by: FAMILY MEDICINE

## 2021-02-12 RX ORDER — TRIAMTERENE AND HYDROCHLOROTHIAZIDE 75; 50 MG/1; MG/1
1 TABLET ORAL DAILY
Qty: 90 TAB | Refills: 3 | Status: SHIPPED | OUTPATIENT
Start: 2021-02-12 | End: 2022-01-07 | Stop reason: SDUPTHER

## 2021-02-12 RX ORDER — PHENTERMINE HYDROCHLORIDE 30 MG/1
30 CAPSULE ORAL
Qty: 30 CAP | Refills: 0 | Status: SHIPPED | OUTPATIENT
Start: 2021-02-12 | End: 2021-03-18 | Stop reason: SDUPTHER

## 2021-02-12 NOTE — PROGRESS NOTES
1. Have you been to the ER, urgent care clinic since your last visit? Hospitalized since your last visit? No    2. Have you seen or consulted any other health care providers outside of the [address] [place] [address] since your last visit? Include any pap smears or colon screening. No     Chief Complaint   Patient presents with    Medication Refill     Patient-Reported Vitals 2/12/2021   Patient-Reported Weight 190lb   Patient-Reported Height -   Patient-Reported Pulse 88   Patient-Reported Temperature -   Patient-Reported Systolic  799   Patient-Reported Diastolic 76   Patient-Reported LMP -      3 most recent PHQ Screens 2/12/2021   Little interest or pleasure in doing things Not at all   Feeling down, depressed, irritable, or hopeless Not at all   Total Score PHQ 2 0   Trouble falling or staying asleep, or sleeping too much -   Feeling tired or having little energy -   Poor appetite, weight loss, or overeating -   Feeling bad about yourself - or that you are a failure or have let yourself or your family down -   Trouble concentrating on things such as school, work, reading, or watching TV -   Moving or speaking so slowly that other people could have noticed; or the opposite being so fidgety that others notice -   Thoughts of being better off dead, or hurting yourself in some way -   PHQ 9 Score -   How difficult have these problems made it for you to do your work, take care of your home and get along with others -     Health Maintenance Due   Topic Date Due    DTaP/Tdap/Td series (1 - Tdap) 08/22/1987    Shingrix Vaccine Age 50> (1 of 2) 08/22/2016    Colorectal Cancer Screening Combo  08/22/2016    Flu Vaccine (1) 09/01/2020    PAP AKA CERVICAL CYTOLOGY  04/22/2021     Pharmacy verified.    Dorinda Creek [address] [facility], [address]

## 2021-02-12 NOTE — PROGRESS NOTES
Allen Vargas is a 48 y.o. female who was seen by synchronous (real-time) audio-video technology on 2/12/2021 for Medication Refill    Her job situation has resolved  Her bp is better  She also wants a refill on phentermine      Assessment & Plan:   Diagnoses and all orders for this visit:    1. Essential hypertension  -     METABOLIC PANEL, COMPREHENSIVE; Future  -     triamterene-hydroCHLOROthiazide (MAXZIDE) 75-50 mg per tablet; Take 1 Tab by mouth daily. BP is well controlled  2. Obesity (BMI 30-39.9)  -     phentermine 30 mg capsule; Take 1 Cap by mouth every morning. Max Daily Amount: 30 mg. Restart phentermine  Next visit obesity focused 1 month  3. Vitamin D deficiency  -     VITAMIN D, 25 HYDROXY; Future  Monitor and treat as indicated  4. Vitamin B 12 deficiency  -     VITAMIN B12; Future  S/p GBP  Need to monitor vit B12          Subjective:       Prior to Admission medications    Medication Sig Start Date End Date Taking? Authorizing Provider   phentermine 30 mg capsule Take 1 Cap by mouth every morning. Max Daily Amount: 30 mg. 2/12/21  Yes Edwar Wilson MD   triamterene-hydroCHLOROthiazide [facility]) 75-50 mg per tablet Take 1 Tab by mouth daily. 2/12/21  Yes Edwar Wilson MD   buPROPion [facility]) 150 mg SR tablet Take 1 Tab by mouth two (2) times a day. 11/5/20  Yes Edwar Wilson MD   topiramate (Topamax) 25 mg tablet Take 1 Tab by mouth daily (with dinner). 7/2/20  Yes Edwar Wilson MD   clobetasoL (TEMOVATE) 0.05 % external solution Apply to scalp once daily 3/2/20   Edwar Wilson MD   Estradiol (DIVIGEL) 0.5 mg/0.5 gram (0.1 %) glpk 1 Packet by TransDERmal route daily. 11/13/19   Norberto Johnson MD   progesterone (PROMETRIUM) 100 mg capsule Take 1 Cap by mouth nightly. 11/13/19   Norberto Johnson MD   cyanocobalamin (VITAMIN B-12) 1,000 mcg/mL injection 1 mL by IntraMUSCular route every thirty (30) days.  10/31/19   Edwar Wilson MD   Omeprazole delayed release (PRILOSEC D/R) 20 mg tablet Take 20 mg by mouth daily. Provider, Historical   magnesium citrate solution Take 296 mL by mouth now. Patient reports not taking    Provider, Historical     Patient Active Problem List    Diagnosis Date Noted    Weight gain, abnormal 08/02/2018    Gastroesophageal reflux disease without esophagitis 08/02/2018    Uterine leiomyoma 08/26/2016    Essential hypertension 03/29/2016    Status post gastric bypass for obesity 03/29/2016    Cheloid scar 03/29/2016     Current Outpatient Medications   Medication Sig Dispense Refill    phentermine 30 mg capsule Take 1 Cap by mouth every morning. Max Daily Amount: 30 mg. 30 Cap 0    triamterene-hydroCHLOROthiazide (MAXZIDE) 75-50 mg per tablet Take 1 Tab by mouth daily. 90 Tab 3    buPROPion SR (WELLBUTRIN SR) 150 mg SR tablet Take 1 Tab by mouth two (2) times a day. 60 Tab 2    topiramate (Topamax) 25 mg tablet Take 1 Tab by mouth daily (with dinner). 30 Tab 6    clobetasoL (TEMOVATE) 0.05 % external solution Apply to scalp once daily 50 mL 2    Estradiol (DIVIGEL) 0.5 mg/0.5 gram (0.1 %) glpk 1 Packet by TransDERmal route daily. 90 Packet 4    progesterone (PROMETRIUM) 100 mg capsule Take 1 Cap by mouth nightly. 90 Cap 4    cyanocobalamin (VITAMIN B-12) 1,000 mcg/mL injection 1 mL by IntraMUSCular route every thirty (30) days. 10 mL 0    Omeprazole delayed release (PRILOSEC D/R) 20 mg tablet Take 20 mg by mouth daily.  magnesium citrate solution Take 296 mL by mouth now.  Patient reports not taking         ROS    Objective:     Patient-Reported Vitals 2/12/2021   Patient-Reported Weight 190lb   Patient-Reported Height -   Patient-Reported Pulse 88   Patient-Reported Temperature -   Patient-Reported Systolic  600   Patient-Reported Diastolic 76   Patient-Reported LMP -        [INSTRUCTIONS:  \"[x]\" Indicates a positive item  \"[]\" Indicates a negative item  -- DELETE ALL ITEMS NOT EXAMINED]    Constitutional: [x] Appears well-developed and well-nourished [x] No apparent distress      [] Abnormal -     Mental status: [x] Alert and awake  [x] Oriented to person/place/time [x] Able to follow commands    [] Abnormal -     Eyes:   EOM    [x]  Normal    [] Abnormal -   Sclera  [x]  Normal    [] Abnormal -          Discharge [x]  None visible   [] Abnormal -     HENT: [x] Normocephalic, atraumatic  [] Abnormal -   [x] Mouth/Throat: Mucous membranes are moist    External Ears [x] Normal  [] Abnormal -    Neck: [x] No visualized mass [] Abnormal -     Pulmonary/Chest: [x] Respiratory effort normal   [x] No visualized signs of difficulty breathing or respiratory distress        [] Abnormal -      Musculoskeletal:   [x] Normal gait with no signs of ataxia         [x] Normal range of motion of neck        [] Abnormal -     Neurological:        [x] No Facial Asymmetry (Cranial nerve 7 motor function) (limited exam due to video visit)          [x] No gaze palsy        [] Abnormal -          Skin:        [x] No significant exanthematous lesions or discoloration noted on facial skin         [] Abnormal -            Psychiatric:       [x] Normal Affect [] Abnormal -        [x] No Hallucinations    Other pertinent observable physical exam findings:-        We discussed the expected course, resolution and complications of the diagnosis(es) in detail. Medication risks, benefits, costs, interactions, and alternatives were discussed as indicated. I advised her to contact the office if her condition worsens, changes or fails to improve as anticipated. She expressed understanding with the diagnosis(es) and plan. Neal Carrillo, who was evaluated through a patient-initiated, synchronous (real-time) audio-video encounter, and/or her healthcare decision maker, is aware that it is a billable service, with coverage as determined by her insurance carrier. She provided verbal consent to proceed: Yes, and patient identification was verified.  It was conducted pursuant to the emergency declaration under the [address] [facility], [address] [place] [address] authority and the [employer] General Act. A caregiver was present when appropriate. Ability to conduct physical exam was limited. I was at home. The patient was at home.       Danny Montano MD

## 2021-03-08 ENCOUNTER — VIRTUAL VISIT (OUTPATIENT)
Dept: FAMILY MEDICINE CLINIC | Age: 56
End: 2021-03-08
Payer: COMMERCIAL

## 2021-03-08 DIAGNOSIS — Z56.6 STRESSFUL JOB: Primary | ICD-10-CM

## 2021-03-08 DIAGNOSIS — F41.9 ANXIOUSNESS: ICD-10-CM

## 2021-03-08 PROCEDURE — 99213 OFFICE O/P EST LOW 20 MIN: CPT | Performed by: FAMILY MEDICINE

## 2021-03-08 SDOH — HEALTH STABILITY - MENTAL HEALTH: OTHER PHYSICAL AND MENTAL STRAIN RELATED TO WORK: Z56.6

## 2021-03-08 NOTE — PROGRESS NOTES
Marie Rubi is a 48 y.o. female who was seen by synchronous (real-time) audio-video technology on 3/8/2021 for Follow-up    She has a case with [employer] she needs a letter from me that tells that I put her on wellbutrin for the stress and depression and emotional duress  she was experiencing that wer job related      Assessment & Plan:   Diagnoses and all orders for this visit:    1. Stressful job    2. Anxiousness    letter writte on her behalf concerning her seeking treatment for these job related issues/medical problems  She continues to take the meds. No c/o complications. continue      Subjective:       Prior to Admission medications    Medication Sig Start Date End Date Taking? Authorizing Provider   phentermine 30 mg capsule Take 1 Cap by mouth every morning. Max Daily Amount: 30 mg. 2/12/21  Yes Savanna Stewart MD   triamterene-hydroCHLOROthiazide [facility]) 75-50 mg per tablet Take 1 Tab by mouth daily. 2/12/21  Yes Savanna Stewart MD   buPROPion SR [facility]) 150 mg SR tablet Take 1 Tab by mouth two (2) times a day. 11/5/20  Yes Savanna Stewart MD   topiramate (Topamax) 25 mg tablet Take 1 Tab by mouth daily (with dinner). 7/2/20  Yes Savanna Stewart MD   clobetasoL (TEMOVATE) 0.05 % external solution Apply to scalp once daily 3/2/20   Savanna Stewart MD   Estradiol (DIVIGEL) 0.5 mg/0.5 gram (0.1 %) glpk 1 Packet by TransDERmal route daily. 11/13/19   Jennifer Ortiz MD   progesterone (PROMETRIUM) 100 mg capsule Take 1 Cap by mouth nightly. 11/13/19   Jennifer Ortiz MD   cyanocobalamin (VITAMIN B-12) 1,000 mcg/mL injection 1 mL by IntraMUSCular route every thirty (30) days. 10/31/19   Savanna Stewart MD   Omeprazole delayed release (PRILOSEC D/R) 20 mg tablet Take 20 mg by mouth daily. Provider, Historical   magnesium citrate solution Take 296 mL by mouth now.  Patient reports not taking    Provider, Historical     Patient Active Problem List    Diagnosis Date Noted    Weight gain, abnormal 08/02/2018    Gastroesophageal reflux disease without esophagitis 08/02/2018    Uterine leiomyoma 08/26/2016    Essential hypertension 03/29/2016    Status post gastric bypass for obesity 03/29/2016    Cheloid scar 03/29/2016     Current Outpatient Medications   Medication Sig Dispense Refill    phentermine 30 mg capsule Take 1 Cap by mouth every morning. Max Daily Amount: 30 mg. 30 Cap 0    triamterene-hydroCHLOROthiazide (MAXZIDE) 75-50 mg per tablet Take 1 Tab by mouth daily. 90 Tab 3    buPROPion SR (WELLBUTRIN SR) 150 mg SR tablet Take 1 Tab by mouth two (2) times a day. 60 Tab 2    topiramate (Topamax) 25 mg tablet Take 1 Tab by mouth daily (with dinner). 30 Tab 6    clobetasoL (TEMOVATE) 0.05 % external solution Apply to scalp once daily 50 mL 2    Estradiol (DIVIGEL) 0.5 mg/0.5 gram (0.1 %) glpk 1 Packet by TransDERmal route daily. 90 Packet 4    progesterone (PROMETRIUM) 100 mg capsule Take 1 Cap by mouth nightly. 90 Cap 4    cyanocobalamin (VITAMIN B-12) 1,000 mcg/mL injection 1 mL by IntraMUSCular route every thirty (30) days. 10 mL 0    Omeprazole delayed release (PRILOSEC D/R) 20 mg tablet Take 20 mg by mouth daily.  magnesium citrate solution Take 296 mL by mouth now.  Patient reports not taking         ROS    Objective:     Patient-Reported Vitals 3/8/2021   Patient-Reported Weight 190lb   Patient-Reported Height -   Patient-Reported Pulse 85   Patient-Reported Temperature -   Patient-Reported Systolic  093   Patient-Reported Diastolic 78   Patient-Reported LMP -        [INSTRUCTIONS:  \"[x]\" Indicates a positive item  \"[]\" Indicates a negative item  -- DELETE ALL ITEMS NOT EXAMINED]    Constitutional: [x] Appears well-developed and well-nourished [x] No apparent distress      [] Abnormal -     Mental status: [x] Alert and awake  [x] Oriented to person/place/time [x] Able to follow commands    [] Abnormal -     Eyes:   EOM    [x]  Normal    [] Abnormal -   Sclera  [x]  Normal    [] Abnormal - Discharge [x]  None visible   [] Abnormal -     HENT: [x] Normocephalic, atraumatic  [] Abnormal -   [x] Mouth/Throat: Mucous membranes are moist    External Ears [x] Normal  [] Abnormal -    Neck: [x] No visualized mass [] Abnormal -     Pulmonary/Chest: [x] Respiratory effort normal   [x] No visualized signs of difficulty breathing or respiratory distress        [] Abnormal -      Musculoskeletal:   [x] Normal gait with no signs of ataxia         [x] Normal range of motion of neck        [] Abnormal -     Neurological:        [x] No Facial Asymmetry (Cranial nerve 7 motor function) (limited exam due to video visit)          [x] No gaze palsy        [] Abnormal -          Skin:        [x] No significant exanthematous lesions or discoloration noted on facial skin         [] Abnormal -            Psychiatric:       [x] Normal Affect [] Abnormal -        [x] No Hallucinations    Other pertinent observable physical exam findings:-        We discussed the expected course, resolution and complications of the diagnosis(es) in detail. Medication risks, benefits, costs, interactions, and alternatives were discussed as indicated. I advised her to contact the office if her condition worsens, changes or fails to improve as anticipated. She expressed understanding with the diagnosis(es) and plan. Stephanie Malloy, was evaluated through a synchronous (real-time) audio-video encounter. The patient (or guardian if applicable) is aware that this is a billable service. Verbal consent to proceed has been obtained within the past 12 months. The visit was conducted pursuant to the emergency declaration under the [address] [place] [address] authority and the [employer] and [employer] General Act. Patient identification was verified, and a caregiver was present when appropriate.  The patient was located in a state where the provider was credentialed to provide care.      Keven Hurtado MD      7

## 2021-03-08 NOTE — PROGRESS NOTES
1. Have you been to the ER, urgent care clinic since your last visit? Hospitalized since your last visit? No    2. Have you seen or consulted any other health care providers outside of the [address] [place] [address] since your last visit? Include any pap smears or colon screening.  No     Chief Complaint   Patient presents with    Follow-up     Patient-Reported Vitals 3/8/2021   Patient-Reported Weight 190lb   Patient-Reported Height -   Patient-Reported Pulse 85   Patient-Reported Temperature -   Patient-Reported Systolic  961   Patient-Reported Diastolic 78   Patient-Reported LMP -      3 most recent PHQ Screens 3/8/2021   Little interest or pleasure in doing things Several days   Feeling down, depressed, irritable, or hopeless Several days   Total Score PHQ 2 2   Trouble falling or staying asleep, or sleeping too much -   Feeling tired or having little energy -   Poor appetite, weight loss, or overeating -   Feeling bad about yourself - or that you are a failure or have let yourself or your family down -   Trouble concentrating on things such as school, work, reading, or watching TV -   Moving or speaking so slowly that other people could have noticed; or the opposite being so fidgety that others notice -   Thoughts of being better off dead, or hurting yourself in some way -   PHQ 9 Score -   How difficult have these problems made it for you to do your work, take care of your home and get along with others -     Health Maintenance Due   Topic Date Due    Hepatitis C Screening  Never done    DTaP/Tdap/Td series (1 - Tdap) Never done    Shingrix Vaccine Age 50> (1 of 2) Never done    Colorectal Cancer Screening Combo  Never done    Flu Vaccine (1) 09/01/2020    PAP AKA CERVICAL CYTOLOGY  04/22/2021

## 2021-03-08 NOTE — LETTER
3/8/2021 11:29 AM 
 
Ms. Staley Pall 
[address] [facility] [address] Dear Dilshad Maguire, This is to confirm that Ms Kenan Comer was placed on an antidepressant as a treatment for the reactive depression and emotional duress that she was experiencing relative to work related issues. She was also treated for hair loss associated with these same  Stresses.  
 
Sincerely, 
 
 
Natalie Lopez MD

## 2021-03-09 ENCOUNTER — TELEPHONE (OUTPATIENT)
Dept: FAMILY MEDICINE CLINIC | Age: 56
End: 2021-03-09

## 2021-03-18 DIAGNOSIS — E66.9 OBESITY (BMI 30-39.9): ICD-10-CM

## 2021-03-19 ENCOUNTER — TELEPHONE (OUTPATIENT)
Dept: FAMILY MEDICINE CLINIC | Age: 56
End: 2021-03-19

## 2021-03-19 NOTE — TELEPHONE ENCOUNTER
Pt has appt scheduled on 4/21/21    ----- Message from Ashish Tresa sent at 3/19/2021  8:50 AM EDT -----  Regarding: Dr. Gisele Kwon (if not patient): n/a      Relationship of caller (if not patient): n/a      Best contact number(s): [phone]      Name of medication and dosage if known: adipex       Is patient out of this medication (yes/no): yes      Pharmacy name: [address] [place] [id number] listed in chart? (yes/no): yes  Pharmacy phone number: [phone]      Details to clarify the request: needs refill if possible because appt is not until 4/21 and she is out of them.       Ashish Gtz

## 2021-03-20 RX ORDER — PHENTERMINE HYDROCHLORIDE 30 MG/1
30 CAPSULE ORAL
Qty: 30 CAP | Refills: 0 | Status: SHIPPED | OUTPATIENT
Start: 2021-03-20 | End: 2021-04-23 | Stop reason: ALTCHOICE

## 2021-04-23 ENCOUNTER — VIRTUAL VISIT (OUTPATIENT)
Dept: FAMILY MEDICINE CLINIC | Age: 56
End: 2021-04-23
Payer: COMMERCIAL

## 2021-04-23 DIAGNOSIS — E66.9 OBESITY (BMI 30-39.9): ICD-10-CM

## 2021-04-23 DIAGNOSIS — F41.9 ANXIOUSNESS: ICD-10-CM

## 2021-04-23 DIAGNOSIS — I10 ESSENTIAL HYPERTENSION: ICD-10-CM

## 2021-04-23 DIAGNOSIS — Z56.6 STRESSFUL JOB: Primary | ICD-10-CM

## 2021-04-23 PROCEDURE — 99214 OFFICE O/P EST MOD 30 MIN: CPT | Performed by: FAMILY MEDICINE

## 2021-04-23 RX ORDER — PHENTERMINE HYDROCHLORIDE 37.5 MG/1
37.5 CAPSULE ORAL
Qty: 30 CAP | Refills: 0 | Status: SHIPPED | OUTPATIENT
Start: 2021-05-14 | End: 2021-07-20 | Stop reason: SDUPTHER

## 2021-04-23 RX ORDER — PHENTERMINE HYDROCHLORIDE 30 MG/1
30 CAPSULE ORAL
Qty: 30 CAP | Refills: 0 | Status: CANCELLED | OUTPATIENT
Start: 2021-04-23

## 2021-04-23 SDOH — HEALTH STABILITY - MENTAL HEALTH: OTHER PHYSICAL AND MENTAL STRAIN RELATED TO WORK: Z56.6

## 2021-04-23 NOTE — PROGRESS NOTES
Identified pt with two pt identifiers(name and ). Reviewed record in preparation for visit and have obtained necessary documentation. Chief Complaint   Patient presents with    Medication Refill        Health Maintenance Due   Topic    Hepatitis C Screening     COVID-19 Vaccine (1)    DTaP/Tdap/Td series (1 - Tdap)    Shingrix Vaccine Age 49> (1 of 2)    Colorectal Cancer Screening Combo     PAP AKA CERVICAL CYTOLOGY        Coordination of Care Questionnaire:  :   1) Have you been to an emergency room, urgent care, or hospitalized since your last visit? If yes, where when, and reason for visit? no      2. Have seen or consulted any other health care provider since your last visit? If yes, where when, and reason for visit?  no        Patient is accompanied by self I have received verbal consent from Sommer Ritter to discuss any/all medical information while they are present in the room.

## 2021-04-23 NOTE — PROGRESS NOTES
Weight Loss Program Progress Note:   F/up Physician Visit    Jose Angel Rojas is a 48 y.o. female who was seen by synchronous (real-time) audio-video technology on 4/23/2021. Consent:  She and/or her healthcare decision maker is aware that this patient-initiated Telehealth encounter is a billable service, with coverage as determined by her insurance carrier. She is aware that she may receive a bill and has provided verbal consent to proceed: Yes    I was at home while conducting this encounter. 712  Subjective:   Jose Angel Rojas was seen for Medication Refill  last month was 3/3 for phentermine  She has to take this month off  Now 194  Goal weightBMI 29  Highest weight 243  March she is taking wellbutrin, getting from behaviorist now  She has gained 4 lbs on phentermine and wellbutrin  She had a trial w [employer] and this was a source of a lot of stress  She thinks this week she has been stress eating a lot  She has started going to the gym this week      f/up physician visit for the VLCD / LCD Program.  Prior to Admission medications    Medication Sig Start Date End Date Taking? Authorizing Provider   phentermine 37.5 mg capsule Take 37.5 mg by mouth every morning. Max Daily Amount: 37.5 mg. Special NOTE: start date may 14, 2021 5/14/21  Yes Fern Nyhan, MD   triamterene-hydroCHLOROthiazide [facility]) 75-50 mg per tablet Take 1 Tab by mouth daily. 2/12/21  Yes Fern Nyhan, MD   buPROPion SR [facility] SR) 150 mg SR tablet Take 1 Tab by mouth two (2) times a day. 11/5/20  Yes Fern Nyhan, MD   topiramate (Topamax) 25 mg tablet Take 1 Tab by mouth daily (with dinner). 7/2/20  Yes Fern Nyhan, MD   phentermine 30 mg capsule Take 1 Cap by mouth every morning. Max Daily Amount: 30 mg. 3/20/21 4/23/21  Fern Nyhan, MD   clobetasoL (TEMOVATE) 0.05 % external solution Apply to scalp once daily 3/2/20   Fern Nyhan, MD   Estradiol (DIVIGEL) 0.5 mg/0.5 gram (0.1 %) glpk 1 Packet by TransDERmal route daily. 11/13/19   Phuong Sabillon MD   progesterone (PROMETRIUM) 100 mg capsule Take 1 Cap by mouth nightly. 11/13/19   Phuong Sabillon MD   cyanocobalamin (VITAMIN B-12) 1,000 mcg/mL injection 1 mL by IntraMUSCular route every thirty (30) days. 10/31/19   Lety Davidson MD   Omeprazole delayed release (PRILOSEC D/R) 20 mg tablet Take 20 mg by mouth daily. Provider, Historical   magnesium citrate solution Take 296 mL by mouth now. Patient reports not taking    Provider, Historical     No Known Allergies    Patient Active Problem List    Diagnosis Date Noted    Weight gain, abnormal 08/02/2018    Gastroesophageal reflux disease without esophagitis 08/02/2018    Uterine leiomyoma 08/26/2016    Essential hypertension 03/29/2016    Status post gastric bypass for obesity 03/29/2016    Cheloid scar 03/29/2016     Current Outpatient Medications   Medication Sig Dispense Refill    [START ON 5/14/2021] phentermine 37.5 mg capsule Take 37.5 mg by mouth every morning. Max Daily Amount: 37.5 mg. Special NOTE: start date may 14, 2021 30 Cap 0    triamterene-hydroCHLOROthiazide (MAXZIDE) 75-50 mg per tablet Take 1 Tab by mouth daily. 90 Tab 3    buPROPion SR (WELLBUTRIN SR) 150 mg SR tablet Take 1 Tab by mouth two (2) times a day. 60 Tab 2    topiramate (Topamax) 25 mg tablet Take 1 Tab by mouth daily (with dinner). 30 Tab 6    clobetasoL (TEMOVATE) 0.05 % external solution Apply to scalp once daily 50 mL 2    Estradiol (DIVIGEL) 0.5 mg/0.5 gram (0.1 %) glpk 1 Packet by TransDERmal route daily. 90 Packet 4    progesterone (PROMETRIUM) 100 mg capsule Take 1 Cap by mouth nightly. 90 Cap 4    cyanocobalamin (VITAMIN B-12) 1,000 mcg/mL injection 1 mL by IntraMUSCular route every thirty (30) days. 10 mL 0    Omeprazole delayed release (PRILOSEC D/R) 20 mg tablet Take 20 mg by mouth daily.  magnesium citrate solution Take 296 mL by mouth now.  Patient reports not taking         ROS      CC: Weight Management      Rhett Tomas Tamia Corbett is a 48 y.o. female who is here for her      Weight Metrics 6/4/2020 3/2/2020 11/13/2019 10/7/2019 8/22/2019 4/1/2019 11/27/2018   Weight 203 lb 6.4 oz 203 lb 195 lb 197 lb 199 lb 215 lb 238 lb 1.6 oz   Neck Circ (inches) - - - - - - -   Waist Measure Inches - - - - - - -   Exercise Mins/week - - - - - - -   Body Fat % - - - - - - -   BMI 29.18 kg/m2 29.13 kg/m2 27.98 kg/m2 28.27 kg/m2 28.55 kg/m2 30.85 kg/m2 34.16 kg/m2         Outpatient Medications Marked as Taking for the 4/23/21 encounter (Virtual Visit) with Patricia Cedeno MD   Medication Sig Dispense Refill    [START ON 5/14/2021] phentermine 37.5 mg capsule Take 37.5 mg by mouth every morning. Max Daily Amount: 37.5 mg. Special NOTE: start date may 14, 2021 30 Cap 0    triamterene-hydroCHLOROthiazide (MAXZIDE) 75-50 mg per tablet Take 1 Tab by mouth daily. 90 Tab 3    buPROPion SR (WELLBUTRIN SR) 150 mg SR tablet Take 1 Tab by mouth two (2) times a day. 60 Tab 2    topiramate (Topamax) 25 mg tablet Take 1 Tab by mouth daily (with dinner). 30 Tab 6         Participation   Did you attend clinic and class last week? no    Review of Systems  Since your last visit, have you experienced any complications? no  If yes, please list:       Are you taking an appetite suppressant? yes  If so, is there any Chest Pain, Palpitations or Dizziness? BP Readings from Last 3 Encounters:   06/04/20 92/61   03/02/20 102/69   11/13/19 123/73       SLEEP:    Have you received any other medical care this week? no  If yes, where and for what? Have you discontinued or changed any medicine or dose of your medicine since your last visit with Dr Stephanie Freeman? no  If yes, where and for what? Diet  How many ounces of calorie-free liquids did you consume each day? 64 oz          Exercise  Aerobic exercise: see abovemin  Resistance exercise: see above workouts / week  Any discomfort?  no     If yes, where? Objective  There were no vitals taken for this visit.   No LMP recorded. (Menstrual status: Menopause). PHYSICAL EXAMINATION:  [ INSTRUCTIONS:  \"[x]\" Indicates a positive item  \"[]\" Indicates a negative item  -- DELETE ALL ITEMS NOT EXAMINED]  Vital Signs: (As obtained by patient/caregiver at home)  There were no vitals taken for this visit. Constitutional: [x] Appears well-developed and well-nourished [x] No apparent distress      [] Abnormal -     Mental status: [x] Alert and awake  [x] Oriented to person/place/time [x] Able to follow commands    [] Abnormal -     Eyes:   EOM    [x]  Normal    [] Abnormal -   Sclera  [x]  Normal    [] Abnormal -          Discharge [x]  None visible   [] Abnormal -     HENT: [x] Normocephalic, atraumatic  [] Abnormal -   [x] Mouth/Throat: Mucous membranes are moist    External Ears [x] Normal  [] Abnormal -    Neck: [x] No visualized mass [] Abnormal -     Pulmonary/Chest: [x] Respiratory effort normal   [x] No visualized signs of difficulty breathing or respiratory distress        [] Abnormal -      Musculoskeletal:   [x] Normal gait with no signs of ataxia         [x] Normal range of motion of neck        [] Abnormal -     Neurological:        [x] No Facial Asymmetry (Cranial nerve 7 motor function) (limited exam due to video visit)          [x] No gaze palsy        [] Abnormal -          Skin:        [x] No significant exanthematous lesions or discoloration noted on facial skin         [] Abnormal -            Psychiatric:       [x] Normal Affect [] Abnormal -        [x] No Hallucinations    Other pertinent observable physical exam findings:-      Assessment / Plan    Encounter Diagnoses   Name Primary?  Obesity (BMI 30-39. 9)     Stressful job Yes    Essential hypertension     Anxiousness      Diagnoses and all orders for this visit:    1.  Stressful job  Still in the [employer] and this is stressful for her  The exercise and the meds are both important  The therapy is also much needed to help get through this period  2. Obesity (BMI 30-39.9)  -     phentermine 37.5 mg capsule; Take 37.5 mg by mouth every morning. Max Daily Amount: 37.5 mg. Special NOTE: start date may 14, 2021  DIETget back on track w low carb 1200 luis max  ACTIVITY increase to 300 mins a week  MEDICATION start phentermine at 37.5 mg next month. Take a break this month  Continue the wellbutrin  3. Essential hypertension  No change needed in meds  4. Anxiousness  In therapy and the job situation has started to get settled      1. Weight management control uncertain   Progress was reviewed with patient    2. Labs    Latest results reviewed with patient          The primary encounter diagnosis was Stressful job. Diagnoses of Obesity (BMI 30-39.9), Essential hypertension, and Anxiousness were also pertinent to this visit. Follow-up and Dispositions    · Return in about 1 month (around 5/23/2021). Coding Help - Use CPT Codes 43217-63419, [id number] for Established and New Patients respectively, either employing EM elements or Time rules. Other codes (example consult codes) may also apply. We discussed the expected course, resolution and complications of the diagnosis(es) in detail. Medication risks, benefits, costs, interactions, and alternatives were discussed as indicated. I advised her to contact the office if her condition worsens, changes or fails to improve as anticipated. She expressed understanding with the diagnosis(es) and plan. Pursuant to the emergency declaration under the [facility], [facility] waiver authority and the [employer] Act, this Virtual  Visit was conducted, with patient's consent, to reduce the patient's risk of exposure to COVID-19 and provide continuity of care for an established patient. Services were provided through a video synchronous discussion virtually to substitute for in-person clinic visit.     Fawad Mcdaniel Wu Perkins MD

## 2021-07-12 ENCOUNTER — VIRTUAL VISIT (OUTPATIENT)
Dept: FAMILY MEDICINE CLINIC | Age: 56
End: 2021-07-12
Payer: COMMERCIAL

## 2021-07-12 DIAGNOSIS — I10 ESSENTIAL HYPERTENSION: Primary | ICD-10-CM

## 2021-07-12 DIAGNOSIS — E66.9 OBESITY (BMI 30-39.9): ICD-10-CM

## 2021-07-12 DIAGNOSIS — Z98.84 STATUS POST GASTRIC BYPASS FOR OBESITY: ICD-10-CM

## 2021-07-12 PROCEDURE — 99214 OFFICE O/P EST MOD 30 MIN: CPT | Performed by: FAMILY MEDICINE

## 2021-07-12 NOTE — PROGRESS NOTES
1. Have you been to the ER, urgent care clinic since your last visit? Hospitalized since your last visit? No    2. Have you seen or consulted any other health care providers outside of the [address] [place] [address] since your last visit? Include any pap smears or colon screening. No     Chief Complaint   Patient presents with    Weight Management    Medication Refill     Patient-Reported Vitals 7/12/2021   Patient-Reported Weight 195lb   Patient-Reported Height -   Patient-Reported Pulse 88   Patient-Reported Temperature -   Patient-Reported Systolic  712   Patient-Reported Diastolic 75   Patient-Reported LMP -      Health Maintenance Due   Topic Date Due    Hepatitis C Screening  Never done    COVID-19 Vaccine (1) Never done    DTaP/Tdap/Td series (1 - Tdap) Never done    Colorectal Cancer Screening Combo  Never done    Shingrix Vaccine Age 50> (1 of 2) Never done    PAP AKA CERVICAL CYTOLOGY  04/22/2021     3 most recent PHQ Screens 7/12/2021   Little interest or pleasure in doing things Not at all   Feeling down, depressed, irritable, or hopeless Not at all   Total Score PHQ 2 0   Trouble falling or staying asleep, or sleeping too much -   Feeling tired or having little energy -   Poor appetite, weight loss, or overeating -   Feeling bad about yourself - or that you are a failure or have let yourself or your family down -   Trouble concentrating on things such as school, work, reading, or watching TV -   Moving or speaking so slowly that other people could have noticed; or the opposite being so fidgety that others notice -   Thoughts of being better off dead, or hurting yourself in some way -   PHQ 9 Score -   How difficult have these problems made it for you to do your work, take care of your home and get along with others -     Abuse Screening Questionnaire 7/12/2021   Do you ever feel afraid of your partner? N   Are you in a relationship with someone who physically or mentally threatens you?  N   Is it safe for you to go home?  Y     Learning Assessment 3/29/2016   PRIMARY LEARNER Patient   HIGHEST LEVEL OF EDUCATION - PRIMARY LEARNER  4 YEARS OF COLLEGE   BARRIERS PRIMARY LEARNER NONE   CO-LEARNER CAREGIVER No   PRIMARY LANGUAGE ENGLISH    NEED No   LEARNER PREFERENCE PRIMARY LISTENING   LEARNING SPECIAL TOPICS no   ANSWERED BY patient   RELATIONSHIP SELF

## 2021-07-12 NOTE — PROGRESS NOTES
Randa Shine is a 48 y.o. female who was seen by synchronous (real-time) audio-video technology on 7/12/2021. Consent:  She and/or her healthcare decision maker is aware that this patient-initiated Telehealth encounter is a billable service, with coverage as determined by her insurance carrier. She is aware that she may receive a bill and has provided verbal consent to proceed: Yes    I was at home while conducting this encounter. Randa Shine is a 48 y.o. female  who is here for her follow up  Evaluation for the medical bariatric care. CC: I want to be healthier    Weight History  Current weight 195 and BMI is There is no height or weight on file to calculate BMI. Goal weight BMI 29   Highest weight *243  (See weight gain time line scanned into media section of chart)  She has been exercising the last 2 weeks consistently  45 min in am high intensity aerobics            Significant Medical History    Update on sleep apnea and  CPAP 4-5 hrs no cpap    Ever had bariatric surgery: yes    Pregnant or planning on becoming pregnant w/in 6 months: no         Significant Psychosocial History     Current Major Lifestyle Changes: no  Any potential unsupportive people: no          Social History  Social History     Tobacco Use    Smoking status: Never Smoker    Smokeless tobacco: Never Used   Substance Use Topics    Alcohol use: No     How many times a week do you eat out?  many    Do you drink any EtOH?  no   If so, how much? Do you have upcoming any travel in the next 6 weeks?  no   If so, what do you have planned? Exercise  How many days a week do you currently exercise? 5 days  Have you ever been told by a physician not to exercise?  no      Objective  There were no vitals taken for this visit.       Waist Circumference: See Weight Management Doc Flowsheet  Neck Circumference: See Weight Management Doc Flowsheet  Percent Body Fat: See Weight Management Doc Flowsheet  Weight Metrics 6/4/2020 3/2/2020 11/13/2019 10/7/2019 8/22/2019 4/1/2019 11/27/2018   Weight 203 lb 6.4 oz 203 lb 195 lb 197 lb 199 lb 215 lb 238 lb 1.6 oz   Neck Circ (inches) - - - - - - -   Waist Measure Inches - - - - - - -   Exercise Mins/week - - - - - - -   Body Fat % - - - - - - -   BMI 29.18 kg/m2 29.13 kg/m2 27.98 kg/m2 28.27 kg/m2 28.55 kg/m2 30.85 kg/m2 34.16 kg/m2         Labs: now-past due    Review of Systems  Complete ROS negative except where noted above          712  Subjective:   John Jacques was seen for Weight Management and Medication Refill      Prior to Admission medications    Medication Sig Start Date End Date Taking? Authorizing Provider   phentermine 37.5 mg capsule Take 37.5 mg by mouth every morning. Max Daily Amount: 37.5 mg. Special NOTE: start date may 14, 2021 5/14/21  Yes Jatin Preston MD   triamterene-hydroCHLOROthiazide [facility]) 75-50 mg per tablet Take 1 Tab by mouth daily. 2/12/21  Yes Jatin Preston MD   buPROPion [facility]) 150 mg SR tablet Take 1 Tab by mouth two (2) times a day. 11/5/20  Yes Jatin Preston MD   topiramate (Topamax) 25 mg tablet Take 1 Tab by mouth daily (with dinner). Patient not taking: Reported on 7/12/2021 7/2/20   Jatin Preston MD   clobetasoL (TEMOVATE) 0.05 % external solution Apply to scalp once daily  Patient not taking: Reported on 7/12/2021 3/2/20   Jatin Preston MD   Estradiol (DIVIGEL) 0.5 mg/0.5 gram (0.1 %) glpk 1 Packet by TransDERmal route daily. Patient not taking: Reported on 7/12/2021 11/13/19   Pretty Akhtar MD   progesterone (PROMETRIUM) 100 mg capsule Take 1 Cap by mouth nightly. Patient not taking: Reported on 7/12/2021 11/13/19   Pretty Akhtar MD   cyanocobalamin (VITAMIN B-12) 1,000 mcg/mL injection 1 mL by IntraMUSCular route every thirty (30) days. Patient not taking: Reported on 7/12/2021 10/31/19   Jatin Preston MD   Omeprazole delayed release (PRILOSEC D/R) 20 mg tablet Take 20 mg by mouth daily.   Patient not taking: Reported on 7/12/2021    Provider, Historical   magnesium citrate solution Take 296 mL by mouth now. Patient reports not taking  Patient not taking: Reported on 7/12/2021    Provider, Historical     No Known Allergies    Patient Active Problem List    Diagnosis Date Noted    Weight gain, abnormal 08/02/2018    Gastroesophageal reflux disease without esophagitis 08/02/2018    Uterine leiomyoma 08/26/2016    Essential hypertension 03/29/2016    Status post gastric bypass for obesity 03/29/2016    Cheloid scar 03/29/2016     Current Outpatient Medications   Medication Sig Dispense Refill    phentermine 37.5 mg capsule Take 37.5 mg by mouth every morning. Max Daily Amount: 37.5 mg. Special NOTE: start date may 14, 2021 30 Cap 0    triamterene-hydroCHLOROthiazide (MAXZIDE) 75-50 mg per tablet Take 1 Tab by mouth daily. 90 Tab 3    buPROPion SR (WELLBUTRIN SR) 150 mg SR tablet Take 1 Tab by mouth two (2) times a day. 60 Tab 2    topiramate (Topamax) 25 mg tablet Take 1 Tab by mouth daily (with dinner). (Patient not taking: Reported on 7/12/2021) 30 Tab 6    clobetasoL (TEMOVATE) 0.05 % external solution Apply to scalp once daily (Patient not taking: Reported on 7/12/2021) 50 mL 2    Estradiol (DIVIGEL) 0.5 mg/0.5 gram (0.1 %) glpk 1 Packet by TransDERmal route daily. (Patient not taking: Reported on 7/12/2021) 90 Packet 4    progesterone (PROMETRIUM) 100 mg capsule Take 1 Cap by mouth nightly. (Patient not taking: Reported on 7/12/2021) 90 Cap 4    cyanocobalamin (VITAMIN B-12) 1,000 mcg/mL injection 1 mL by IntraMUSCular route every thirty (30) days. (Patient not taking: Reported on 7/12/2021) 10 mL 0    Omeprazole delayed release (PRILOSEC D/R) 20 mg tablet Take 20 mg by mouth daily. (Patient not taking: Reported on 7/12/2021)      magnesium citrate solution Take 296 mL by mouth now.  Patient reports not taking (Patient not taking: Reported on 7/12/2021)         ROS    PHYSICAL EXAMINATION:  [ INSTRUCTIONS:  \"[x]\" Indicates a positive item  \"[]\" Indicates a negative item  -- DELETE ALL ITEMS NOT EXAMINED]  Vital Signs: (As obtained by patient/caregiver at home)  There were no vitals taken for this visit. Constitutional: [x] Appears well-developed and well-nourished [x] No apparent distress      [] Abnormal -     Mental status: [x] Alert and awake  [x] Oriented to person/place/time [x] Able to follow commands    [] Abnormal -     Eyes:   EOM    [x]  Normal    [] Abnormal -   Sclera  [x]  Normal    [] Abnormal -          Discharge [x]  None visible   [] Abnormal -     HENT: [x] Normocephalic, atraumatic  [] Abnormal -   [x] Mouth/Throat: Mucous membranes are moist    External Ears [x] Normal  [] Abnormal -    Neck: [x] No visualized mass [] Abnormal -     Pulmonary/Chest: [x] Respiratory effort normal   [x] No visualized signs of difficulty breathing or respiratory distress        [] Abnormal -      Musculoskeletal:   [x] Normal gait with no signs of ataxia         [x] Normal range of motion of neck        [] Abnormal -     Neurological:        [x] No Facial Asymmetry (Cranial nerve 7 motor function) (limited exam due to video visit)          [x] No gaze palsy        [] Abnormal -          Skin:        [x] No significant exanthematous lesions or discoloration noted on facial skin         [] Abnormal -            Psychiatric:       [x] Normal Affect [] Abnormal -        [x] No Hallucinations    Other pertinent observable physical exam findings:-      Assessment & Plan  Diagnoses and all orders for this visit:    1. Essential hypertension    2. Obesity (BMI 30-39. 9)  Diet: cont low carb 1200 luis diet    Activity: cont working to be consistent and vary exercise to challenge muscle groups    Medication wellbutrin and topamax    3.  Status post gastric bypass for obesity          :    Based on his history and exam, John Jacques is a good candidate for the  [facility]     There are no Patient Instructions on file for this visit. Coding Help - Use CPT Codes 13484-30985, 04830-42278 for Established and New Patients respectively, either employing EM elements or Time rules. Other codes (example consult codes) may also apply. The primary encounter diagnosis was Essential hypertension. Diagnoses of Obesity (BMI 30-39.9) and Status post gastric bypass for obesity were also pertinent to this visit. The patient verbalizes understanding and agrees with the plan of care      We discussed the expected course, resolution and complications of the diagnosis(es) in detail. Medication risks, benefits, costs, interactions, and alternatives were discussed as indicated. I advised her to contact the office if her condition worsens, changes or fails to improve as anticipated. She expressed understanding with the diagnosis(es) and plan. Pursuant to the emergency declaration under the [facility], [facility] waiver authority and the [employer] General Act, this Virtual  Visit was conducted, with patient's consent, to reduce the patient's risk of exposure to COVID-19 and provide continuity of care for an established patient. Services were provided through a video synchronous discussion virtually to substitute for in-person clinic visit.     Waleska Gutierrez MD

## 2021-07-20 DIAGNOSIS — E66.9 OBESITY (BMI 30-39.9): ICD-10-CM

## 2021-07-21 RX ORDER — PHENTERMINE HYDROCHLORIDE 37.5 MG/1
37.5 CAPSULE ORAL
Qty: 30 CAPSULE | Refills: 0 | Status: SHIPPED | OUTPATIENT
Start: 2021-07-21 | End: 2021-09-03 | Stop reason: SDUPTHER

## 2021-09-03 ENCOUNTER — VIRTUAL VISIT (OUTPATIENT)
Dept: FAMILY MEDICINE CLINIC | Age: 56
End: 2021-09-03
Payer: COMMERCIAL

## 2021-09-03 DIAGNOSIS — E66.9 OBESITY (BMI 30-39.9): ICD-10-CM

## 2021-09-03 DIAGNOSIS — Z98.84 STATUS POST GASTRIC BYPASS FOR OBESITY: ICD-10-CM

## 2021-09-03 DIAGNOSIS — I10 ESSENTIAL HYPERTENSION: Primary | ICD-10-CM

## 2021-09-03 PROCEDURE — 99214 OFFICE O/P EST MOD 30 MIN: CPT | Performed by: FAMILY MEDICINE

## 2021-09-03 RX ORDER — PHENTERMINE HYDROCHLORIDE 37.5 MG/1
37.5 CAPSULE ORAL
Qty: 30 CAPSULE | Refills: 0 | Status: SHIPPED | OUTPATIENT
Start: 2021-09-03 | End: 2022-03-21

## 2021-09-03 NOTE — PROGRESS NOTES
Luis Romano is a 55 y.o. female who was seen by synchronous (real-time) audio-video technology on 9/3/2021. Consent:  She and/or her healthcare decision maker is aware that this patient-initiated Telehealth encounter is a billable service, with coverage as determined by her insurance carrier. She is aware that she may receive a bill and has provided verbal consent to proceed: Yes    I was at home while conducting this encounter. Luis Romano is a 55 y.o. female  who is here for her follow up  Evaluation for the medical bariatric care. CC: I want to be healthier  Went to [place] for 10 days and ate out of control  She has gained 4-5 lbs  Had no phentermine in august but did in July  She had gotten down to 195, now at 200    Weight History  Current weight  200 and BMI is There is no height or weight on file to calculate BMI. Goal weight    Highest weight 243  (See weight gain time line scanned into media section of chart)        Significant Medical History    Update on sleep apnea and  CPAP no cpap    Ever had bariatric surgery: no    Pregnant or planning on becoming pregnant w/in 6 months: no         Significant Psychosocial History     Current Major Lifestyle Changes: no  Any potential unsupportive people: no          Social History  Social History     Tobacco Use    Smoking status: Never Smoker    Smokeless tobacco: Never Used   Substance Use Topics    Alcohol use: No     How many times a week do you eat out? Many on vacation    Do you drink any EtOH?  no   If so, how much? Do you have upcoming any travel in the next 6 weeks?  no   If so, what do you have planned? Exercise  How many days a week do you currently exercise? 5 days at gym  Have you ever been told by a physician not to exercise?  no      Objective  There were no vitals taken for this visit.       Waist Circumference: See Weight Management Doc Flowsheet  Neck Circumference: See Weight Management Doc Flowsheet  Percent Body Fat: See Weight Management Doc Flowsheet  Weight Metrics 6/4/2020 3/2/2020 11/13/2019 10/7/2019 8/22/2019 4/1/2019 11/27/2018   Weight 203 lb 6.4 oz 203 lb 195 lb 197 lb 199 lb 215 lb 238 lb 1.6 oz   Neck Circ (inches) - - - - - - -   Waist Measure Inches - - - - - - -   Exercise Mins/week - - - - - - -   Body Fat % - - - - - - -   BMI 29.18 kg/m2 29.13 kg/m2 27.98 kg/m2 28.27 kg/m2 28.55 kg/m2 30.85 kg/m2 34.16 kg/m2         Labs:     Review of Systems  Complete ROS negative except where noted above          712  Subjective:   Alley Obrien was seen for Medication Refill (Phentermine)      Prior to Admission medications    Medication Sig Start Date End Date Taking? Authorizing Provider   phentermine 37.5 mg capsule Take 37.5 mg by mouth every morning. Max Daily Amount: 37.5 mg. Special NOTE: start date may 14, 2021 9/3/21  Yes Caryle Catalan, MD   triamterene-hydroCHLOROthiazide [facility]) 75-50 mg per tablet Take 1 Tab by mouth daily. 2/12/21  Yes Caryle Catalan, MD   buPROPion SR [facility]) 150 mg SR tablet Take 1 Tab by mouth two (2) times a day. 11/5/20  Yes Caryle Catalan, MD   topiramate (Topamax) 25 mg tablet Take 1 Tab by mouth daily (with dinner). Patient not taking: Reported on 7/12/2021 7/2/20   Caryle Catalan, MD   clobetasoL (TEMOVATE) 0.05 % external solution Apply to scalp once daily  Patient not taking: Reported on 7/12/2021 3/2/20   Caryle Catalan, MD   Estradiol (DIVIGEL) 0.5 mg/0.5 gram (0.1 %) glpk 1 Packet by TransDERmal route daily. Patient not taking: Reported on 7/12/2021 11/13/19   Asia Bower MD   progesterone (PROMETRIUM) 100 mg capsule Take 1 Cap by mouth nightly. Patient not taking: Reported on 7/12/2021 11/13/19   Asia Bower MD   cyanocobalamin (VITAMIN B-12) 1,000 mcg/mL injection 1 mL by IntraMUSCular route every thirty (30) days.   Patient not taking: Reported on 7/12/2021 10/31/19   Caryle Catalan, MD   Omeprazole delayed release (PRILOSEC D/R) 20 mg tablet Take 20 mg by mouth daily. Patient not taking: Reported on 7/12/2021    Provider, Historical   magnesium citrate solution Take 296 mL by mouth now. Patient reports not taking  Patient not taking: Reported on 7/12/2021    Provider, Historical     No Known Allergies    Patient Active Problem List    Diagnosis Date Noted    Weight gain, abnormal 08/02/2018    Gastroesophageal reflux disease without esophagitis 08/02/2018    Uterine leiomyoma 08/26/2016    Essential hypertension 03/29/2016    Status post gastric bypass for obesity 03/29/2016    Cheloid scar 03/29/2016     Current Outpatient Medications   Medication Sig Dispense Refill    phentermine 37.5 mg capsule Take 37.5 mg by mouth every morning. Max Daily Amount: 37.5 mg. Special NOTE: start date may 15, 2021 30 Capsule 0    triamterene-hydroCHLOROthiazide (MAXZIDE) 75-50 mg per tablet Take 1 Tab by mouth daily. 90 Tab 3    buPROPion SR (WELLBUTRIN SR) 150 mg SR tablet Take 1 Tab by mouth two (2) times a day. 60 Tab 2    topiramate (Topamax) 25 mg tablet Take 1 Tab by mouth daily (with dinner). (Patient not taking: Reported on 7/12/2021) 30 Tab 6    clobetasoL (TEMOVATE) 0.05 % external solution Apply to scalp once daily (Patient not taking: Reported on 7/12/2021) 50 mL 2    Estradiol (DIVIGEL) 0.5 mg/0.5 gram (0.1 %) glpk 1 Packet by TransDERmal route daily. (Patient not taking: Reported on 7/12/2021) 90 Packet 4    progesterone (PROMETRIUM) 100 mg capsule Take 1 Cap by mouth nightly. (Patient not taking: Reported on 7/12/2021) 90 Cap 4    cyanocobalamin (VITAMIN B-12) 1,000 mcg/mL injection 1 mL by IntraMUSCular route every thirty (30) days. (Patient not taking: Reported on 7/12/2021) 10 mL 0    Omeprazole delayed release (PRILOSEC D/R) 20 mg tablet Take 20 mg by mouth daily. (Patient not taking: Reported on 7/12/2021)      magnesium citrate solution Take 296 mL by mouth now.  Patient reports not taking (Patient not taking: Reported on 7/12/2021)         ROS    PHYSICAL EXAMINATION:  [ INSTRUCTIONS:  \"[x]\" Indicates a positive item  \"[]\" Indicates a negative item  -- DELETE ALL ITEMS NOT EXAMINED]  Vital Signs: (As obtained by patient/caregiver at home)  There were no vitals taken for this visit. Constitutional: [x] Appears well-developed and well-nourished [x] No apparent distress      [] Abnormal -     Mental status: [x] Alert and awake  [x] Oriented to person/place/time [x] Able to follow commands    [] Abnormal -     Eyes:   EOM    [x]  Normal    [] Abnormal -   Sclera  [x]  Normal    [] Abnormal -          Discharge [x]  None visible   [] Abnormal -     HENT: [x] Normocephalic, atraumatic  [] Abnormal -   [x] Mouth/Throat: Mucous membranes are moist    External Ears [x] Normal  [] Abnormal -    Neck: [x] No visualized mass [] Abnormal -     Pulmonary/Chest: [x] Respiratory effort normal   [x] No visualized signs of difficulty breathing or respiratory distress        [] Abnormal -      Musculoskeletal:   [x] Normal gait with no signs of ataxia         [x] Normal range of motion of neck        [] Abnormal -     Neurological:        [x] No Facial Asymmetry (Cranial nerve 7 motor function) (limited exam due to video visit)          [x] No gaze palsy        [] Abnormal -          Skin:        [x] No significant exanthematous lesions or discoloration noted on facial skin         [] Abnormal -            Psychiatric:       [x] Normal Affect [] Abnormal -        [x] No Hallucinations    Other pertinent observable physical exam findings:-      Assessment & Plan  Diagnoses and all orders for this visit:    1. Essential hypertension  No change in meds  2. Obesity (BMI 30-39.9)  -     phentermine 37.5 mg capsule; Take 37.5 mg by mouth every morning. Max Daily Amount: 37.5 mg.  Special NOTE: start date may 14, 2021  Diet:cont low carb LCD to get back below bmi 30    Activity: aim for 300 min per week    Medication: refilled phentermine  rx 1of 3( 12 week limit)  3. Status post gastric bypass for obesity              Based on his history and exam, Mj Urban is a good candidate for the  [facility]     There are no Patient Instructions on file for this visit. Coding Help - Use CPT Codes 05528-21133, [id number] for Established and New Patients respectively, either employing EM elements or Time rules. Other codes (example consult codes) may also apply. The primary encounter diagnosis was Essential hypertension. Diagnoses of Obesity (BMI 30-39.9) and Status post gastric bypass for obesity were also pertinent to this visit. The patient verbalizes understanding and agrees with the plan of care      We discussed the expected course, resolution and complications of the diagnosis(es) in detail. Medication risks, benefits, costs, interactions, and alternatives were discussed as indicated. I advised her to contact the office if her condition worsens, changes or fails to improve as anticipated. She expressed understanding with the diagnosis(es) and plan. Pursuant to the emergency declaration under the [facility], [facility] waiver authority and the [employer] General Act, this Virtual  Visit was conducted, with patient's consent, to reduce the patient's risk of exposure to COVID-19 and provide continuity of care for an established patient. Services were provided through a video synchronous discussion virtually to substitute for in-person clinic visit.     Ivon Nguyen MD

## 2021-09-03 NOTE — PROGRESS NOTES
1. Have you been to the ER, urgent care clinic since your last visit? Hospitalized since your last visit? No    2. Have you seen or consulted any other health care providers outside of the [address] [place] [address] since your last visit? Include any pap smears or colon screening. No    Chief Complaint   Patient presents with    Medication Refill     Phentermine     Health Maintenance Due   Topic Date Due    Hepatitis C Screening  Never done    COVID-19 Vaccine (1) Never done    DTaP/Tdap/Td series (1 - Tdap) Never done    Colorectal Cancer Screening Combo  Never done    Shingrix Vaccine Age 50> (1 of 2) Never done    PAP AKA CERVICAL CYTOLOGY  04/22/2021    Flu Vaccine (1) 09/01/2021     Patient-Reported Vitals 9/3/2021   Patient-Reported Weight 200lb   Patient-Reported Height -   Patient-Reported Pulse -   Patient-Reported Temperature -   Patient-Reported Systolic  -   Patient-Reported Diastolic -   Patient-Reported LMP -        3 most recent PHQ Screens 9/3/2021   Little interest or pleasure in doing things Not at all   Feeling down, depressed, irritable, or hopeless Not at all   Total Score PHQ 2 0   Trouble falling or staying asleep, or sleeping too much -   Feeling tired or having little energy -   Poor appetite, weight loss, or overeating -   Feeling bad about yourself - or that you are a failure or have let yourself or your family down -   Trouble concentrating on things such as school, work, reading, or watching TV -   Moving or speaking so slowly that other people could have noticed; or the opposite being so fidgety that others notice -   Thoughts of being better off dead, or hurting yourself in some way -   PHQ 9 Score -   How difficult have these problems made it for you to do your work, take care of your home and get along with others -     Abuse Screening Questionnaire 9/3/2021   Do you ever feel afraid of your partner?  N   Are you in a relationship with someone who physically or mentally threatens you? N   Is it safe for you to go home?  Y     Learning Assessment 3/29/2016   PRIMARY LEARNER Patient   HIGHEST LEVEL OF EDUCATION - PRIMARY LEARNER  4 YEARS OF COLLEGE   BARRIERS PRIMARY LEARNER NONE   CO-LEARNER CAREGIVER No   PRIMARY LANGUAGE ENGLISH    NEED No   LEARNER PREFERENCE PRIMARY LISTENING   LEARNING SPECIAL TOPICS no   ANSWERED BY patient   RELATIONSHIP SELF

## 2022-01-07 DIAGNOSIS — I10 ESSENTIAL HYPERTENSION: ICD-10-CM

## 2022-01-07 NOTE — TELEPHONE ENCOUNTER
PCP: Eugenia Madison MD    Last appt: 9/3/2021  Future Appointments   Date Time Provider Tray Richardson   1/10/2022  8:30 AM Eugenia Madison MD [facility] AMB       Requested Prescriptions     Pending Prescriptions Disp Refills    triamterene-hydroCHLOROthiazide (MAXZIDE) 75-50 mg per tablet 90 Tablet 3     Sig: Take 1 Tablet by mouth daily.        Prior labs and Blood pressures:  BP Readings from Last 3 Encounters:   06/04/20 92/61   03/02/20 102/69   11/13/19 123/73     Lab Results   Component Value Date/Time    Sodium 138 03/02/2020 03:10 PM    Potassium 3.5 03/02/2020 03:10 PM    Chloride 102 03/02/2020 03:10 PM    CO2 28 03/02/2020 03:10 PM    Anion gap 8 03/02/2020 03:10 PM    Glucose 91 03/02/2020 03:10 PM    BUN 21 (H) 03/02/2020 03:10 PM    Creatinine 0.68 03/02/2020 03:10 PM    BUN/Creatinine ratio 31 (H) 03/02/2020 03:10 PM    GFR est AA >60 03/02/2020 03:10 PM    GFR est non-AA >60 03/02/2020 03:10 PM    Calcium 9.5 03/02/2020 03:10 PM     Lab Results   Component Value Date/Time    Hemoglobin A1c 5.7 (H) 03/29/2016 09:06 AM     Lab Results   Component Value Date/Time    Cholesterol, total 178 06/13/2018 02:23 PM    HDL Cholesterol 64 06/13/2018 02:23 PM    LDL, calculated 98 06/13/2018 02:23 PM    VLDL, calculated 16 06/13/2018 02:23 PM    Triglyceride 78 06/13/2018 02:23 PM     Lab Results   Component Value Date/Time    Vitamin D 25-Hydroxy 23.6 (L) 03/02/2020 03:10 PM       Lab Results   Component Value Date/Time    TSH 1.190 04/01/2019 04:53 PM

## 2022-01-07 NOTE — TELEPHONE ENCOUNTER
Patient states out of medication. Patient requesting medication sent to pharmacy until upcoming appointment with Dr. Kori Stroud.     Next appointment: 1/10/2022

## 2022-01-09 RX ORDER — TRIAMTERENE AND HYDROCHLOROTHIAZIDE 75; 50 MG/1; MG/1
1 TABLET ORAL DAILY
Qty: 90 TABLET | Refills: 1 | Status: SHIPPED | OUTPATIENT
Start: 2022-01-09 | End: 2022-03-21 | Stop reason: SDUPTHER

## 2022-01-10 ENCOUNTER — VIRTUAL VISIT (OUTPATIENT)
Dept: FAMILY MEDICINE CLINIC | Age: 57
End: 2022-01-10
Payer: COMMERCIAL

## 2022-01-10 DIAGNOSIS — E66.9 OBESITY (BMI 30-39.9): ICD-10-CM

## 2022-01-10 DIAGNOSIS — Z98.84 STATUS POST GASTRIC BYPASS FOR OBESITY: ICD-10-CM

## 2022-01-10 DIAGNOSIS — I10 ESSENTIAL HYPERTENSION: Primary | ICD-10-CM

## 2022-01-10 DIAGNOSIS — E55.9 VITAMIN D DEFICIENCY: ICD-10-CM

## 2022-01-10 PROCEDURE — 99213 OFFICE O/P EST LOW 20 MIN: CPT | Performed by: FAMILY MEDICINE

## 2022-01-10 NOTE — PROGRESS NOTES
Sascha Warner is a 55 y.o. female who was seen by synchronous (real-time) audio-video technology on 1/10/2022 for Hypertension and Medication Refill      Has gone from 203 to 212  She does plan to start working on weight loss  She needs refill on meds  No complaints    Assessment & Plan:   Diagnoses and all orders for this visit:    1. Essential hypertension  No change in meds  Cont to monitor  2. Obesity (BMI 30-39. 9)  Make an appt at the weight management location to restart care  3. Post GBP  Need to have B12 checked soon and cbc and vit D  4, vit D deficiency  Recheck with next labs          Subjective:       Prior to Admission medications    Medication Sig Start Date End Date Taking? Authorizing Provider   triamterene-hydroCHLOROthiazide (MAXZIDE) 75-50 mg per tablet Take 1 Tablet by mouth daily. 1/9/22  Yes Amanda Raphael MD   phentermine 37.5 mg capsule Take 37.5 mg by mouth every morning. Max Daily Amount: 37.5 mg. Special NOTE: start date may 14, 2021 9/3/21  Yes Dheeraj Del Rio MD   buPROPion SR [facility] SR) 150 mg SR tablet Take 1 Tab by mouth two (2) times a day. Patient not taking: Reported on 1/10/2022 11/5/20   Dheeraj Del Rio MD   topiramate (Topamax) 25 mg tablet Take 1 Tab by mouth daily (with dinner). Patient not taking: Reported on 7/12/2021 7/2/20   Dheeraj Del Rio MD   clobetasoL (TEMOVATE) 0.05 % external solution Apply to scalp once daily  Patient not taking: Reported on 7/12/2021 3/2/20   Dheeraj Del Rio MD   Estradiol (DIVIGEL) 0.5 mg/0.5 gram (0.1 %) glpk 1 Packet by TransDERmal route daily. Patient not taking: Reported on 7/12/2021 11/13/19   Monster Moreno MD   progesterone (PROMETRIUM) 100 mg capsule Take 1 Cap by mouth nightly. Patient not taking: Reported on 7/12/2021 11/13/19   Monster Moreno MD   cyanocobalamin (VITAMIN B-12) 1,000 mcg/mL injection 1 mL by IntraMUSCular route every thirty (30) days.   Patient not taking: Reported on 7/12/2021 10/31/19   Dheeraj Del Rio MD Omeprazole delayed release (PRILOSEC D/R) 20 mg tablet Take 20 mg by mouth daily. Patient not taking: Reported on 7/12/2021    Provider, Historical   magnesium citrate solution Take 296 mL by mouth now. Patient reports not taking  Patient not taking: Reported on 7/12/2021    Provider, Historical     Patient Active Problem List    Diagnosis Date Noted    Weight gain, abnormal 08/02/2018    Gastroesophageal reflux disease without esophagitis 08/02/2018    Uterine leiomyoma 08/26/2016    Essential hypertension 03/29/2016    Status post gastric bypass for obesity 03/29/2016    Cheloid scar 03/29/2016     Current Outpatient Medications   Medication Sig Dispense Refill    triamterene-hydroCHLOROthiazide (MAXZIDE) 75-50 mg per tablet Take 1 Tablet by mouth daily. 90 Tablet 1    phentermine 37.5 mg capsule Take 37.5 mg by mouth every morning. Max Daily Amount: 37.5 mg. Special NOTE: start date may 15, 2021 30 Capsule 0    buPROPion SR (WELLBUTRIN SR) 150 mg SR tablet Take 1 Tab by mouth two (2) times a day. (Patient not taking: Reported on 1/10/2022) 60 Tab 2    topiramate (Topamax) 25 mg tablet Take 1 Tab by mouth daily (with dinner). (Patient not taking: Reported on 7/12/2021) 30 Tab 6    clobetasoL (TEMOVATE) 0.05 % external solution Apply to scalp once daily (Patient not taking: Reported on 7/12/2021) 50 mL 2    Estradiol (DIVIGEL) 0.5 mg/0.5 gram (0.1 %) glpk 1 Packet by TransDERmal route daily. (Patient not taking: Reported on 7/12/2021) 90 Packet 4    progesterone (PROMETRIUM) 100 mg capsule Take 1 Cap by mouth nightly. (Patient not taking: Reported on 7/12/2021) 90 Cap 4    cyanocobalamin (VITAMIN B-12) 1,000 mcg/mL injection 1 mL by IntraMUSCular route every thirty (30) days. (Patient not taking: Reported on 7/12/2021) 10 mL 0    Omeprazole delayed release (PRILOSEC D/R) 20 mg tablet Take 20 mg by mouth daily.  (Patient not taking: Reported on 7/12/2021)      magnesium citrate solution Take 296 mL by mouth now. Patient reports not taking (Patient not taking: Reported on 7/12/2021)         ROS    Objective:     Patient-Reported Vitals 1/10/2022   Patient-Reported Weight 212lb   Patient-Reported Height -   Patient-Reported Pulse -   Patient-Reported Temperature -   Patient-Reported Systolic  249   Patient-Reported Diastolic 78   Patient-Reported LMP -        [INSTRUCTIONS:  \"[x]\" Indicates a positive item  \"[]\" Indicates a negative item  -- DELETE ALL ITEMS NOT EXAMINED]    Constitutional: [x] Appears well-developed and well-nourished [x] No apparent distress      [] Abnormal -     Mental status: [x] Alert and awake  [x] Oriented to person/place/time [x] Able to follow commands    [] Abnormal -     Eyes:   EOM    [x]  Normal    [] Abnormal -   Sclera  [x]  Normal    [] Abnormal -          Discharge [x]  None visible   [] Abnormal -     HENT: [x] Normocephalic, atraumatic  [] Abnormal -   [x] Mouth/Throat: Mucous membranes are moist    External Ears [x] Normal  [] Abnormal -    Neck: [x] No visualized mass [] Abnormal -     Pulmonary/Chest: [x] Respiratory effort normal   [x] No visualized signs of difficulty breathing or respiratory distress        [] Abnormal -      Musculoskeletal:   [x] Normal gait with no signs of ataxia         [x] Normal range of motion of neck        [] Abnormal -     Neurological:        [x] No Facial Asymmetry (Cranial nerve 7 motor function) (limited exam due to video visit)          [x] No gaze palsy        [] Abnormal -          Skin:        [x] No significant exanthematous lesions or discoloration noted on facial skin         [] Abnormal -            Psychiatric:       [x] Normal Affect [] Abnormal -        [x] No Hallucinations    Other pertinent observable physical exam findings:-        We discussed the expected course, resolution and complications of the diagnosis(es) in detail. Medication risks, benefits, costs, interactions, and alternatives were discussed as indicated.   I advised her to contact the office if her condition worsens, changes or fails to improve as anticipated. She expressed understanding with the diagnosis(es) and plan. Candelario Sultana, was evaluated through a synchronous (real-time) audio-video encounter. The patient (or guardian if applicable) is aware that this is a billable service. Verbal consent to proceed has been obtained within the past 12 months. The visit was conducted pursuant to the emergency declaration under the [address] [place] [address] authority and the [employer] General Act. Patient identification was verified, and a caregiver was present when appropriate. The patient was located in a state where the provider was credentialed to provide care.       Bhargav Forte MD

## 2022-01-10 NOTE — PROGRESS NOTES
1. Have you been to the ER, urgent care clinic since your last visit? Hospitalized since your last visit? Covid [facility] 12/2021    2. Have you seen or consulted any other health care providers outside of the [address] [place] [address] since your last visit? Include any pap smears or colon screening. No    Chief Complaint   Patient presents with    Hypertension    Medication Refill     3 most recent PHQ Screens 1/10/2022   Little interest or pleasure in doing things Not at all   Feeling down, depressed, irritable, or hopeless Not at all   Total Score PHQ 2 0   Trouble falling or staying asleep, or sleeping too much -   Feeling tired or having little energy -   Poor appetite, weight loss, or overeating -   Feeling bad about yourself - or that you are a failure or have let yourself or your family down -   Trouble concentrating on things such as school, work, reading, or watching TV -   Moving or speaking so slowly that other people could have noticed; or the opposite being so fidgety that others notice -   Thoughts of being better off dead, or hurting yourself in some way -   PHQ 9 Score -   How difficult have these problems made it for you to do your work, take care of your home and get along with others -     Abuse Screening Questionnaire 1/10/2022   Do you ever feel afraid of your partner? N   Are you in a relationship with someone who physically or mentally threatens you? N   Is it safe for you to go home?  Y     Patient-Reported Vitals 1/10/2022   Patient-Reported Weight 212lb   Patient-Reported Height -   Patient-Reported Pulse -   Patient-Reported Temperature -   Patient-Reported Systolic  675   Patient-Reported Diastolic 78   Patient-Reported LMP -      Learning Assessment 3/29/2016   PRIMARY LEARNER Patient   HIGHEST LEVEL OF EDUCATION - PRIMARY LEARNER  4 YEARS OF COLLEGE   BARRIERS PRIMARY LEARNER NONE   CO-LEARNER CAREGIVER No   PRIMARY LANGUAGE ENGLISH    NEED No   LEARNER PREFERENCE PRIMARY LISTENING   LEARNING SPECIAL TOPICS no   ANSWERED BY patient   RELATIONSHIP SELF

## 2022-03-18 ENCOUNTER — TELEPHONE (OUTPATIENT)
Dept: FAMILY MEDICINE CLINIC | Age: 57
End: 2022-03-18

## 2022-03-18 NOTE — TELEPHONE ENCOUNTER
----- Message from April Natalie sent at 3/18/2022  8:54 AM EDT -----  Subject: Referral Request    QUESTIONS   Reason for referral request? Patient was told during her last visit with   Dr. Antonia Melgoza that she can walk in to get her lab work done, patient would   like lab work ordered for her so she can walk in next week. Patient states   it is for her general lab work. Has the physician seen you for this condition before? No   Preferred Specialist (if applicable)? Do you already have an appointment scheduled? No  Additional Information for Provider? Please call patient to advise, thank   you  ---------------------------------------------------------------------------  --------------  CALL BACK INFO  What is the best way for the office to contact you? OK to leave message on   voicemail  Preferred Call Back Phone Number?  [phone]

## 2022-03-19 PROBLEM — R63.5 WEIGHT GAIN, ABNORMAL: Status: ACTIVE | Noted: 2018-08-02

## 2022-03-19 PROBLEM — K21.9 GASTROESOPHAGEAL REFLUX DISEASE WITHOUT ESOPHAGITIS: Status: ACTIVE | Noted: 2018-08-02

## 2022-03-21 ENCOUNTER — VIRTUAL VISIT (OUTPATIENT)
Dept: FAMILY MEDICINE CLINIC | Age: 57
End: 2022-03-21
Payer: COMMERCIAL

## 2022-03-21 DIAGNOSIS — E55.9 VITAMIN D DEFICIENCY: ICD-10-CM

## 2022-03-21 DIAGNOSIS — I10 ESSENTIAL HYPERTENSION: Primary | ICD-10-CM

## 2022-03-21 DIAGNOSIS — Z98.84 STATUS POST GASTRIC BYPASS FOR OBESITY: ICD-10-CM

## 2022-03-21 DIAGNOSIS — E53.8 VITAMIN B 12 DEFICIENCY: ICD-10-CM

## 2022-03-21 DIAGNOSIS — L65.9 ALOPECIA: ICD-10-CM

## 2022-03-21 PROCEDURE — 99214 OFFICE O/P EST MOD 30 MIN: CPT | Performed by: FAMILY MEDICINE

## 2022-03-21 RX ORDER — CLOBETASOL PROPIONATE 0.46 MG/ML
SOLUTION TOPICAL
Qty: 50 ML | Refills: 2 | Status: SHIPPED | OUTPATIENT
Start: 2022-03-21

## 2022-03-21 RX ORDER — TRIAMTERENE AND HYDROCHLOROTHIAZIDE 75; 50 MG/1; MG/1
1 TABLET ORAL DAILY
Qty: 90 TABLET | Refills: 1 | Status: SHIPPED | OUTPATIENT
Start: 2022-03-21 | End: 2022-05-17 | Stop reason: SDUPTHER

## 2022-03-21 NOTE — PROGRESS NOTES
Jay Gallego is a 55 y.o. female who was seen by synchronous (real-time) audio-video technology on 3/21/2022 for Labs, Medication Refill, Hypertension, Vitamin D Deficiency, and Obesity    Doing well. Needs refill on BP meds  Not had labs since 2020  No complaints   her job situation has resolved  Now happy with work life  Not depressed, not taking the wellbutrin    Assessment & Plan:   Diagnoses and all orders for this visit:    1. Essential hypertension  -     METABOLIC PANEL, COMPREHENSIVE; Future  -     triamterene-hydroCHLOROthiazide (MAXZIDE) 75-50 mg per tablet; Take 1 Tablet by mouth daily. bp under control  No change in meds  2. Alopecia  -     clobetasoL (TEMOVATE) 0.05 % external solution; Apply to scalp once daily  Hair is growing back now  Cont to apply the temovate  3. Vitamin B 12 deficiency  -     VITAMIN B12; Future    4. Vitamin D deficiency  -     VITAMIN D, 25 HYDROXY; Future    5. Status post gastric bypass for obesity  -     VITAMIN D, 25 HYDROXY; Future  -     VITAMIN B12; Future            Subjective:       Prior to Admission medications    Medication Sig Start Date End Date Taking? Authorizing Provider   triamterene-hydroCHLOROthiazide (MAXZIDE) 75-50 mg per tablet Take 1 Tablet by mouth daily. 3/21/22  Yes Mckinley Bridges MD   clobetasoL (TEMOVATE) 0.05 % external solution Apply to scalp once daily 3/21/22  Yes Mckinley Bridges MD   triamterene-hydroCHLOROthiazide [facility]) 75-50 mg per tablet Take 1 Tablet by mouth daily. 1/9/22 3/21/22  Caitie Yates MD   phentermine 37.5 mg capsule Take 37.5 mg by mouth every morning. Max Daily Amount: 37.5 mg. Special NOTE: start date may 14, 2021  Patient not taking: Reported on 3/21/2022 9/3/21 3/21/22  Mckinley Bridges MD   buPROPion SR [facility] SR) 150 mg SR tablet Take 1 Tab by mouth two (2) times a day.   Patient not taking: Reported on 1/10/2022 11/5/20 3/21/22  Mckinley Bridges MD   topiramate (Topamax) 25 mg tablet Take 1 Tab by mouth daily (with dinner). Patient not taking: Reported on 7/12/2021 7/2/20 3/21/22  Librado Wade MD   clobetasoL (TEMOVATE) 0.05 % external solution Apply to scalp once daily  Patient not taking: Reported on 7/12/2021 3/2/20 3/21/22  Librado Wade MD   progesterone (PROMETRIUM) 100 mg capsule Take 1 Cap by mouth nightly. Patient not taking: Reported on 7/12/2021 11/13/19   Cristhian Chou MD   Estradiol (DIVIGEL) 0.5 mg/0.5 gram (0.1 %) glpk 1 Packet by TransDERmal route daily. Patient not taking: Reported on 7/12/2021 11/13/19 3/21/22  Cristhian Chou MD   cyanocobalamin (VITAMIN B-12) 1,000 mcg/mL injection 1 mL by IntraMUSCular route every thirty (30) days. Patient not taking: Reported on 7/12/2021 10/31/19   Librado Wade MD   Omeprazole delayed release (PRILOSEC D/R) 20 mg tablet Take 20 mg by mouth daily. Patient not taking: Reported on 7/12/2021    Provider, Historical   magnesium citrate solution Take 296 mL by mouth now. Patient reports not taking  Patient not taking: Reported on 7/12/2021    Provider, Historical     Patient Active Problem List    Diagnosis Date Noted    Weight gain, abnormal 08/02/2018    Gastroesophageal reflux disease without esophagitis 08/02/2018    Uterine leiomyoma 08/26/2016    Essential hypertension 03/29/2016    Status post gastric bypass for obesity 03/29/2016    Cheloid scar 03/29/2016     Current Outpatient Medications   Medication Sig Dispense Refill    triamterene-hydroCHLOROthiazide (MAXZIDE) 75-50 mg per tablet Take 1 Tablet by mouth daily. 90 Tablet 1    clobetasoL (TEMOVATE) 0.05 % external solution Apply to scalp once daily 50 mL 2    progesterone (PROMETRIUM) 100 mg capsule Take 1 Cap by mouth nightly. (Patient not taking: Reported on 7/12/2021) 90 Cap 4    cyanocobalamin (VITAMIN B-12) 1,000 mcg/mL injection 1 mL by IntraMUSCular route every thirty (30) days.  (Patient not taking: Reported on 7/12/2021) 10 mL 0    Omeprazole delayed release (PRILOSEC D/R) 20 mg tablet Take 20 mg by mouth daily. (Patient not taking: Reported on 7/12/2021)      magnesium citrate solution Take 296 mL by mouth now.  Patient reports not taking (Patient not taking: Reported on 7/12/2021)         ROS    Objective:     Patient-Reported Vitals 3/21/2022   Patient-Reported Weight 195lb   Patient-Reported Height -   Patient-Reported Pulse 80   Patient-Reported Temperature -   Patient-Reported Systolic  508   Patient-Reported Diastolic 70   Patient-Reported LMP -        [INSTRUCTIONS:  \"[x]\" Indicates a positive item  \"[]\" Indicates a negative item  -- DELETE ALL ITEMS NOT EXAMINED]    Constitutional: [x] Appears well-developed and well-nourished [x] No apparent distress      [] Abnormal -     Mental status: [x] Alert and awake  [x] Oriented to person/place/time [x] Able to follow commands    [] Abnormal -     Eyes:   EOM    [x]  Normal    [] Abnormal -   Sclera  [x]  Normal    [] Abnormal -          Discharge [x]  None visible   [] Abnormal -     HENT: [x] Normocephalic, atraumatic  [] Abnormal -   [x] Mouth/Throat: Mucous membranes are moist    External Ears [x] Normal  [] Abnormal -    Neck: [x] No visualized mass [] Abnormal -     Pulmonary/Chest: [x] Respiratory effort normal   [x] No visualized signs of difficulty breathing or respiratory distress        [] Abnormal -      Musculoskeletal:   [x] Normal gait with no signs of ataxia         [x] Normal range of motion of neck        [] Abnormal -     Neurological:        [x] No Facial Asymmetry (Cranial nerve 7 motor function) (limited exam due to video visit)          [x] No gaze palsy        [] Abnormal -          Skin:        [x] No significant exanthematous lesions or discoloration noted on facial skin         [] Abnormal -            Psychiatric:       [x] Normal Affect [] Abnormal -        [x] No Hallucinations    Other pertinent observable physical exam findings:-        We discussed the expected course, resolution and complications of the diagnosis(es) in detail. Medication risks, benefits, costs, interactions, and alternatives were discussed as indicated. I advised her to contact the office if her condition worsens, changes or fails to improve as anticipated. She expressed understanding with the diagnosis(es) and plan. Straffordlc Sood, was evaluated through a synchronous (real-time) audio-video encounter. The patient (or guardian if applicable) is aware that this is a billable service, which includes applicable co-pays. Verbal consent to proceed has been obtained. The visit was conducted pursuant to the emergency declaration under the [facility], [address] [place] [address] [facility] authority and the [employer] General Act. Patient identification was verified, and a caregiver was present when appropriate. The patient was located at home in a state where the provider was licensed to provide care.       Cristopher Diallo MD

## 2022-03-21 NOTE — PROGRESS NOTES
1. Have you been to the ER, urgent care clinic since your last visit? Hospitalized since your last visit? No    2. Have you seen or consulted any other health care providers outside of the [address] [place] [address] since your last visit? Include any pap smears or colon screening. No     Chief Complaint   Patient presents with    Labs    Medication Refill    Hypertension    Vitamin D Deficiency    Obesity     Health Maintenance Due   Topic Date Due    Hepatitis C Screening  Never done    COVID-19 Vaccine (1) Never done    DTaP/Tdap/Td series (1 - Tdap) Never done    Colorectal Cancer Screening Combo  Never done    Shingrix Vaccine Age 50> (1 of 2) Never done    Cervical cancer screen  04/22/2021    Flu Vaccine (1) 09/01/2021    Breast Cancer Screen Mammogram  11/05/2021     3 most recent PHQ Screens 3/21/2022   Little interest or pleasure in doing things Not at all   Feeling down, depressed, irritable, or hopeless Not at all   Total Score PHQ 2 0   Trouble falling or staying asleep, or sleeping too much -   Feeling tired or having little energy -   Poor appetite, weight loss, or overeating -   Feeling bad about yourself - or that you are a failure or have let yourself or your family down -   Trouble concentrating on things such as school, work, reading, or watching TV -   Moving or speaking so slowly that other people could have noticed; or the opposite being so fidgety that others notice -   Thoughts of being better off dead, or hurting yourself in some way -   PHQ 9 Score -   How difficult have these problems made it for you to do your work, take care of your home and get along with others -     Abuse Screening Questionnaire 3/21/2022   Do you ever feel afraid of your partner? N   Are you in a relationship with someone who physically or mentally threatens you? N   Is it safe for you to go home?  Y     Patient-Reported Vitals 3/21/2022   Patient-Reported Weight 195lb   Patient-Reported Height - Patient-Reported Pulse 80   Patient-Reported Temperature -   Patient-Reported Systolic  750   Patient-Reported Diastolic 70   Patient-Reported LMP -      Learning Assessment 3/29/2016   PRIMARY LEARNER Patient   HIGHEST LEVEL OF EDUCATION - PRIMARY LEARNER  4 YEARS OF COLLEGE   BARRIERS PRIMARY LEARNER NONE   CO-LEARNER CAREGIVER No   PRIMARY LANGUAGE ENGLISH    NEED No   LEARNER PREFERENCE PRIMARY LISTENING   LEARNING SPECIAL TOPICS no   ANSWERED BY patient   RELATIONSHIP SELF

## 2022-03-23 ENCOUNTER — TELEPHONE (OUTPATIENT)
Dept: FAMILY MEDICINE CLINIC | Age: 57
End: 2022-03-23

## 2022-03-23 LAB
25(OH)D3 SERPL-MCNC: 15.2 NG/ML (ref 30–100)
ALBUMIN SERPL-MCNC: 4.2 G/DL (ref 3.5–5)
ALBUMIN/GLOB SERPL: 1 {RATIO} (ref 1.1–2.2)
ALP SERPL-CCNC: 131 U/L (ref 45–117)
ALT SERPL-CCNC: 23 U/L (ref 12–78)
ANION GAP SERPL CALC-SCNC: 4 MMOL/L (ref 5–15)
AST SERPL-CCNC: 13 U/L (ref 15–37)
BILIRUB SERPL-MCNC: 0.7 MG/DL (ref 0.2–1)
BUN SERPL-MCNC: 14 MG/DL (ref 6–20)
BUN/CREAT SERPL: 18 (ref 12–20)
CALCIUM SERPL-MCNC: 9.5 MG/DL (ref 8.5–10.1)
CHLORIDE SERPL-SCNC: 101 MMOL/L (ref 97–108)
CO2 SERPL-SCNC: 31 MMOL/L (ref 21–32)
CREAT SERPL-MCNC: 0.77 MG/DL (ref 0.55–1.02)
GLOBULIN SER CALC-MCNC: 4.3 G/DL (ref 2–4)
GLUCOSE SERPL-MCNC: 82 MG/DL (ref 65–100)
POTASSIUM SERPL-SCNC: 3.8 MMOL/L (ref 3.5–5.1)
PROT SERPL-MCNC: 8.5 G/DL (ref 6.4–8.2)
SODIUM SERPL-SCNC: 136 MMOL/L (ref 136–145)
VIT B12 SERPL-MCNC: 125 PG/ML (ref 193–986)

## 2022-03-23 NOTE — TELEPHONE ENCOUNTER
----- Message from Libby Arreola sent at 3/23/2022 10:53 AM EDT -----    Regarding: Test Results     Good morning Dr. Lopez Rodas,    I just reviewed my lab test results and noticed that my B12 and vitamin D is extremely low. Would you be prescribing meds or B12 shots to correct that deficiencies? Thanks so much!     Vanice Moritz        Pt phone number: [phone]

## 2022-03-24 ENCOUNTER — VIRTUAL VISIT (OUTPATIENT)
Dept: FAMILY MEDICINE CLINIC | Age: 57
End: 2022-03-24
Payer: COMMERCIAL

## 2022-03-24 DIAGNOSIS — E53.8 VITAMIN B12 DEFICIENCY: ICD-10-CM

## 2022-03-24 DIAGNOSIS — E55.9 VITAMIN D DEFICIENCY: Primary | ICD-10-CM

## 2022-03-24 DIAGNOSIS — E53.8 VITAMIN B 12 DEFICIENCY: ICD-10-CM

## 2022-03-24 PROCEDURE — 99214 OFFICE O/P EST MOD 30 MIN: CPT | Performed by: FAMILY MEDICINE

## 2022-03-24 RX ORDER — CYANOCOBALAMIN 1000 UG/ML
1000 INJECTION, SOLUTION INTRAMUSCULAR; SUBCUTANEOUS
Qty: 10 ML | Refills: 2
Start: 2022-03-24 | End: 2022-03-24 | Stop reason: SDUPTHER

## 2022-03-24 RX ORDER — ERGOCALCIFEROL 1.25 MG/1
50000 CAPSULE ORAL
Qty: 24 CAPSULE | Refills: 0 | Status: SHIPPED | OUTPATIENT
Start: 2022-03-24 | End: 2022-09-02

## 2022-03-24 RX ORDER — CYANOCOBALAMIN 1000 UG/ML
1000 INJECTION, SOLUTION INTRAMUSCULAR; SUBCUTANEOUS
Qty: 10 ML | Refills: 2 | Status: SHIPPED | OUTPATIENT
Start: 2022-03-24

## 2022-03-24 RX ORDER — CYANOCOBALAMIN 1000 UG/ML
1000 INJECTION, SOLUTION INTRAMUSCULAR; SUBCUTANEOUS
Qty: 10 ML | Refills: 0 | Status: SHIPPED | OUTPATIENT
Start: 2022-03-24 | End: 2022-03-24 | Stop reason: SDUPTHER

## 2022-03-24 NOTE — PROGRESS NOTES
1. Have you been to the ER, urgent care clinic since your last visit? Hospitalized since your last visit? No    2. Have you seen or consulted any other health care providers outside of the [address] [place] [address] since your last visit? Include any pap smears or colon screening. No     Chief Complaint   Patient presents with    Results     Health Maintenance Due   Topic Date Due    Hepatitis C Screening  Never done    COVID-19 Vaccine (1) Never done    DTaP/Tdap/Td series (1 - Tdap) Never done    Colorectal Cancer Screening Combo  Never done    Shingrix Vaccine Age 50> (1 of 2) Never done    Cervical cancer screen  04/22/2021    Flu Vaccine (1) 09/01/2021    Breast Cancer Screen Mammogram  11/05/2021     3 most recent PHQ Screens 3/24/2022   Little interest or pleasure in doing things Not at all   Feeling down, depressed, irritable, or hopeless Not at all   Total Score PHQ 2 0   Trouble falling or staying asleep, or sleeping too much -   Feeling tired or having little energy -   Poor appetite, weight loss, or overeating -   Feeling bad about yourself - or that you are a failure or have let yourself or your family down -   Trouble concentrating on things such as school, work, reading, or watching TV -   Moving or speaking so slowly that other people could have noticed; or the opposite being so fidgety that others notice -   Thoughts of being better off dead, or hurting yourself in some way -   PHQ 9 Score -   How difficult have these problems made it for you to do your work, take care of your home and get along with others -     Abuse Screening Questionnaire 3/24/2022   Do you ever feel afraid of your partner? N   Are you in a relationship with someone who physically or mentally threatens you? N   Is it safe for you to go home?  Y     Learning Assessment 3/29/2016   PRIMARY LEARNER Patient   HIGHEST LEVEL OF EDUCATION - PRIMARY LEARNER  [facility] PRIMARY LEARNER NONE   CO-LEARNER CAREGIVER No   PRIMARY LANGUAGE ENGLISH    NEED No   LEARNER PREFERENCE PRIMARY LISTENING   LEARNING SPECIAL TOPICS no   ANSWERED BY patient   RELATIONSHIP SELF     Patient-Reported Vitals 3/21/2022   Patient-Reported Weight 195lb   Patient-Reported Height -   Patient-Reported Pulse 80   Patient-Reported Temperature -   Patient-Reported Systolic  181   Patient-Reported Diastolic 70   Patient-Reported LMP -

## 2022-03-24 NOTE — PROGRESS NOTES
Sejal Stanley is a 55 y.o. female who was seen by synchronous (real-time) audio-video technology on 3/24/2022 for Results  s/p gastric bypass  Labs tested and here to follow up on   b12 and vit D low    Assessment & Plan:   Diagnoses and all orders for this visit:    1. Vitamin D deficiency  -     ergocalciferol (ERGOCALCIFEROL) 1,250 mcg (50,000 unit) capsule; Take 1 Capsule by mouth every seven (7) days for 24 doses. 2. Vitamin B12 deficiency  -     cyanocobalamin (Vitamin B-12) 1,000 mcg/mL injection; 1 mL by IntraMUSCular route every thirty (30) days. Subjective:       Prior to Admission medications    Medication Sig Start Date End Date Taking? Authorizing Provider   ergocalciferol (ERGOCALCIFEROL) 1,250 mcg (50,000 unit) capsule Take 1 Capsule by mouth every seven (7) days for 24 doses. 3/24/22 9/2/22 Yes Cecy Newberry MD   cyanocobalamin (Vitamin B-12) 1,000 mcg/mL injection 1 mL by IntraMUSCular route every thirty (30) days. 3/24/22  Yes Cecy Newberry MD   triamterene-hydroCHLOROthiazide [facility]) 75-50 mg per tablet Take 1 Tablet by mouth daily. 3/21/22  Yes Cecy Newberry MD   clobetasoL (TEMOVATE) 0.05 % external solution Apply to scalp once daily 3/21/22  Yes Cecy Newberry MD   progesterone (PROMETRIUM) 100 mg capsule Take 1 Cap by mouth nightly. Patient not taking: Reported on 7/12/2021 11/13/19   Wale Shay MD   Omeprazole delayed release (PRILOSEC D/R) 20 mg tablet Take 20 mg by mouth daily. Patient not taking: Reported on 7/12/2021    Provider, Historical   magnesium citrate solution Take 296 mL by mouth now.  Patient reports not taking  Patient not taking: Reported on 7/12/2021    Provider, Historical     Patient Active Problem List    Diagnosis Date Noted    Weight gain, abnormal 08/02/2018    Gastroesophageal reflux disease without esophagitis 08/02/2018    Uterine leiomyoma 08/26/2016    Essential hypertension 03/29/2016    Status post gastric bypass for obesity 03/29/2016    Cheloid scar 03/29/2016     Current Outpatient Medications   Medication Sig Dispense Refill    ergocalciferol (ERGOCALCIFEROL) 1,250 mcg (50,000 unit) capsule Take 1 Capsule by mouth every seven (7) days for 24 doses. 24 Capsule 0    cyanocobalamin (Vitamin B-12) 1,000 mcg/mL injection 1 mL by IntraMUSCular route every thirty (30) days. 10 mL 2    triamterene-hydroCHLOROthiazide (MAXZIDE) 75-50 mg per tablet Take 1 Tablet by mouth daily. 90 Tablet 1    clobetasoL (TEMOVATE) 0.05 % external solution Apply to scalp once daily 50 mL 2    progesterone (PROMETRIUM) 100 mg capsule Take 1 Cap by mouth nightly. (Patient not taking: Reported on 7/12/2021) 90 Cap 4    Omeprazole delayed release (PRILOSEC D/R) 20 mg tablet Take 20 mg by mouth daily. (Patient not taking: Reported on 7/12/2021)      magnesium citrate solution Take 296 mL by mouth now.  Patient reports not taking (Patient not taking: Reported on 7/12/2021)         ROS    Objective:     Patient-Reported Vitals 3/24/2022   Patient-Reported Weight 203lb   Patient-Reported Height -   Patient-Reported Pulse -   Patient-Reported Temperature -   Patient-Reported Systolic  -   Patient-Reported Diastolic -   Patient-Reported LMP -        [INSTRUCTIONS:  \"[x]\" Indicates a positive item  \"[]\" Indicates a negative item  -- DELETE ALL ITEMS NOT EXAMINED]    Constitutional: [x] Appears well-developed and well-nourished [x] No apparent distress      [] Abnormal -     Mental status: [x] Alert and awake  [x] Oriented to person/place/time [x] Able to follow commands    [] Abnormal -     Eyes:   EOM    [x]  Normal    [] Abnormal -   Sclera  [x]  Normal    [] Abnormal -          Discharge [x]  None visible   [] Abnormal -     HENT: [x] Normocephalic, atraumatic  [] Abnormal -   [x] Mouth/Throat: Mucous membranes are moist    External Ears [x] Normal  [] Abnormal -    Neck: [x] No visualized mass [] Abnormal -     Pulmonary/Chest: [x] Respiratory effort normal [x] No visualized signs of difficulty breathing or respiratory distress        [] Abnormal -      Musculoskeletal:   [x] Normal gait with no signs of ataxia         [x] Normal range of motion of neck        [] Abnormal -     Neurological:        [x] No Facial Asymmetry (Cranial nerve 7 motor function) (limited exam due to video visit)          [x] No gaze palsy        [] Abnormal -          Skin:        [x] No significant exanthematous lesions or discoloration noted on facial skin         [] Abnormal -            Psychiatric:       [x] Normal Affect [] Abnormal -        [x] No Hallucinations    Other pertinent observable physical exam findings:-        We discussed the expected course, resolution and complications of the diagnosis(es) in detail. Medication risks, benefits, costs, interactions, and alternatives were discussed as indicated. I advised her to contact the office if her condition worsens, changes or fails to improve as anticipated. She expressed understanding with the diagnosis(es) and plan. Ned Aponte, was evaluated through a synchronous (real-time) audio-video encounter. The patient (or guardian if applicable) is aware that this is a billable service, which includes applicable co-pays. Verbal consent to proceed has been obtained. The visit was conducted pursuant to the emergency declaration under the [facility] [address] authority and the [employer] General Act. Patient identification was verified, and a caregiver was present when appropriate. The patient was located at home in a state where the provider was licensed to provide care.       Martinez Bowens MD

## 2022-03-25 ENCOUNTER — CLINICAL SUPPORT (OUTPATIENT)
Dept: FAMILY MEDICINE CLINIC | Age: 57
End: 2022-03-25
Payer: COMMERCIAL

## 2022-03-25 ENCOUNTER — DOCUMENTATION ONLY (OUTPATIENT)
Dept: FAMILY MEDICINE CLINIC | Age: 57
End: 2022-03-25

## 2022-03-25 VITALS
OXYGEN SATURATION: 98 % | RESPIRATION RATE: 16 BRPM | SYSTOLIC BLOOD PRESSURE: 104 MMHG | BODY MASS INDEX: 28.15 KG/M2 | DIASTOLIC BLOOD PRESSURE: 70 MMHG | HEIGHT: 70 IN | WEIGHT: 196.6 LBS | HEART RATE: 71 BPM | TEMPERATURE: 97.8 F

## 2022-03-25 DIAGNOSIS — E53.8 VITAMIN B 12 DEFICIENCY: Primary | ICD-10-CM

## 2022-03-25 PROCEDURE — 96372 THER/PROPH/DIAG INJ SC/IM: CPT | Performed by: FAMILY MEDICINE

## 2022-03-25 RX ORDER — CYANOCOBALAMIN 1000 UG/ML
1000 INJECTION, SOLUTION INTRAMUSCULAR; SUBCUTANEOUS ONCE
Status: COMPLETED | OUTPATIENT
Start: 2022-03-25 | End: 2022-03-25

## 2022-03-25 RX ADMIN — CYANOCOBALAMIN 1000 MCG: 1000 INJECTION, SOLUTION INTRAMUSCULAR; SUBCUTANEOUS at 12:30

## 2022-03-25 NOTE — PROGRESS NOTES
Patient here for injection. After obtaining consent, and per orders of Dr. Kylah Dow, injection of B-12 injection given by Bryson Kay CMA as follows:     Dose amount:  1 ml  Injection site:  Right Deltoid  Route: IM    Patient tolerated injection well. No adverse reactions noted.

## 2022-04-01 NOTE — PROGRESS NOTES
The liver and kidney tests are fine  The vit B12 is now low.  Already discussed at last VV and started injections and po vit D

## 2022-04-25 ENCOUNTER — VIRTUAL VISIT (OUTPATIENT)
Dept: FAMILY MEDICINE CLINIC | Age: 57
End: 2022-04-25
Payer: COMMERCIAL

## 2022-04-25 DIAGNOSIS — E53.8 VITAMIN B 12 DEFICIENCY: ICD-10-CM

## 2022-04-25 DIAGNOSIS — E55.9 VITAMIN D DEFICIENCY: ICD-10-CM

## 2022-04-25 DIAGNOSIS — Z98.84 STATUS POST GASTRIC BYPASS FOR OBESITY: ICD-10-CM

## 2022-04-25 DIAGNOSIS — D17.1 LIPOMA OF LOWER BACK: Primary | ICD-10-CM

## 2022-04-25 DIAGNOSIS — H00.019 HORDEOLUM EXTERNUM, UNSPECIFIED LATERALITY: ICD-10-CM

## 2022-04-25 PROCEDURE — 99214 OFFICE O/P EST MOD 30 MIN: CPT | Performed by: FAMILY MEDICINE

## 2022-04-25 RX ORDER — ERYTHROMYCIN 5 MG/G
OINTMENT OPHTHALMIC
Qty: 3.5 G | Refills: 0 | Status: SHIPPED | OUTPATIENT
Start: 2022-04-25 | End: 2022-05-06

## 2022-04-25 NOTE — PROGRESS NOTES
Patient stated name &     Chief Complaint   Patient presents with    Follow-up     Lump on back     Also, styes on both eyes      Requesting labs to check iron        Health Maintenance Due   Topic    Hepatitis C Screening     COVID-19 Vaccine (1)    DTaP/Tdap/Td series (1 - Tdap)    Colorectal Cancer Screening Combo     Shingrix Vaccine Age 50> (1 of 2)    Cervical cancer screen     Breast Cancer Screen Mammogram        Wt Readings from Last 3 Encounters:   22 196 lb 9.6 oz (89.2 kg)   20 203 lb 6.4 oz (92.3 kg)   20 203 lb (92.1 kg)     Temp Readings from Last 3 Encounters:   22 97.8 °F (36.6 °C) (Temporal)   20 98.1 °F (36.7 °C) (Oral)   20 98 °F (36.7 °C) (Oral)     BP Readings from Last 3 Encounters:   22 104/70   20 92/61   20 102/69     Pulse Readings from Last 3 Encounters:   22 71   20 91   20 85         Learning Assessment:  :     Learning Assessment 3/29/2016   PRIMARY LEARNER Patient   HIGHEST LEVEL OF EDUCATION - PRIMARY LEARNER  4 YEARS OF COLLEGE   BARRIERS PRIMARY LEARNER NONE   CO-LEARNER CAREGIVER No   PRIMARY LANGUAGE ENGLISH    NEED No   LEARNER PREFERENCE PRIMARY LISTENING   LEARNING SPECIAL TOPICS no   ANSWERED BY patient   RELATIONSHIP SELF       Depression Screening:  :     3 most recent PHQ Screens 3/24/2022   Little interest or pleasure in doing things Not at all   Feeling down, depressed, irritable, or hopeless Not at all   Total Score PHQ 2 0   Trouble falling or staying asleep, or sleeping too much -   Feeling tired or having little energy -   Poor appetite, weight loss, or overeating -   Feeling bad about yourself - or that you are a failure or have let yourself or your family down -   Trouble concentrating on things such as school, work, reading, or watching TV -   Moving or speaking so slowly that other people could have noticed; or the opposite being so fidgety that others notice - Thoughts of being better off dead, or hurting yourself in some way -   PHQ 9 Score -   How difficult have these problems made it for you to do your work, take care of your home and get along with others -       Fall Risk Assessment:  :     Fall Risk Assessment, last 12 mths 4/1/2019   Able to walk? Yes   Fall in past 12 months? No       Abuse Screening:  :     Abuse Screening Questionnaire 3/24/2022 3/21/2022 1/10/2022 9/3/2021 7/12/2021 4/1/2019   Do you ever feel afraid of your partner? N N N N N N   Are you in a relationship with someone who physically or mentally threatens you? N N N N N N   Is it safe for you to go home? Y Y Y Y Y Y       Coordination of Care Questionnaire:  :     1) Have you been to an emergency room, urgent care clinic since your last visit? no   Hospitalized since your last visit? no             2) Have you seen or consulted any other health care providers outside of [address] [place] [address] since your last visit? no  (Include any pap smears or colon screenings in this section.)    3) Do you have an Advance Directive on file? no  Are you interested in receiving information about Advance Directives? no    Patient is accompanied by self I have received verbal consent from Antonio Graham to discuss any/all medical information while they are present in the room.

## 2022-04-25 NOTE — PROGRESS NOTES
April Cramer is a 55 y.o. female who was seen by synchronous (real-time) audio-video technology on 4/25/2022 for Follow-up (Lump on back     Also, styes on both eyes      Requesting labs to check iron)  the lipoma on her back has gotten bigger  Her lower back started hurting last week but it went away    She has a stye on both upper lids  The first one was on the right and then dev one on the left  Her bp is low 100s, she wants to stop the maxide      Assessment & Plan:   Diagnoses and all orders for this visit:    1. Lipoma of lower back  -     REFERRAL TO GENERAL SURGERY  Getting worse, causing pain and also has gotten bigger  2. Hordeolum externum, unspecified laterality  -     erythromycin (ILOTYCIN) ophthalmic ointment; Apply small amount to both eyes four times a day for 10 days  If this does not work will refer to opthalmology  3. Vitamin B 12 deficiency  -     VITAMIN B12; Future    4. Vitamin D deficiency  -     VITAMIN D, 25 HYDROXY; Future    5. Status post gastric bypass for obesity  -     CBC WITH AUTOMATED DIFF; Future  -     METABOLIC PANEL, COMPREHENSIVE; Future  -     VITAMIN D, 25 HYDROXY; Future  -     VITAMIN B12; Future            Subjective:       Prior to Admission medications    Medication Sig Start Date End Date Taking? Authorizing Provider   erythromycin (ILOTYCIN) ophthalmic ointment Apply small amount to both eyes four times a day for 10 days 4/25/22 5/6/22 Yes Ever Hart MD   ergocalciferol (ERGOCALCIFEROL) 1,250 mcg (50,000 unit) capsule Take 1 Capsule by mouth every seven (7) days for 24 doses. 3/24/22 9/2/22 Yes Ever Hart MD   cyanocobalamin (Vitamin B-12) 1,000 mcg/mL injection 1 mL by IntraMUSCular route every thirty (30) days. 3/24/22  Yes Ever Hart MD   triamterene-hydroCHLOROthiazide [facility]) 75-50 mg per tablet Take 1 Tablet by mouth daily.  3/21/22  Yes Ever Hart MD   clobetasoL (TEMOVATE) 0.05 % external solution Apply to scalp once daily 3/21/22  Yes Babar Khalil MD   progesterone (PROMETRIUM) 100 mg capsule Take 1 Cap by mouth nightly. Patient not taking: Reported on 7/12/2021 11/13/19   Jenny Haji MD   Omeprazole delayed release (PRILOSEC D/R) 20 mg tablet Take 20 mg by mouth daily. Patient not taking: Reported on 7/12/2021    Provider, Historical   magnesium citrate solution Take 296 mL by mouth now. Patient reports not taking  Patient not taking: Reported on 7/12/2021    Provider, Historical     Patient Active Problem List    Diagnosis Date Noted    Weight gain, abnormal 08/02/2018    Gastroesophageal reflux disease without esophagitis 08/02/2018    Uterine leiomyoma 08/26/2016    Essential hypertension 03/29/2016    Status post gastric bypass for obesity 03/29/2016    Cheloid scar 03/29/2016     Current Outpatient Medications   Medication Sig Dispense Refill    erythromycin (ILOTYCIN) ophthalmic ointment Apply small amount to both eyes four times a day for 10 days 3.5 g 0    ergocalciferol (ERGOCALCIFEROL) 1,250 mcg (50,000 unit) capsule Take 1 Capsule by mouth every seven (7) days for 24 doses. 24 Capsule 0    cyanocobalamin (Vitamin B-12) 1,000 mcg/mL injection 1 mL by IntraMUSCular route every thirty (30) days. 10 mL 2    triamterene-hydroCHLOROthiazide (MAXZIDE) 75-50 mg per tablet Take 1 Tablet by mouth daily. 90 Tablet 1    clobetasoL (TEMOVATE) 0.05 % external solution Apply to scalp once daily 50 mL 2    progesterone (PROMETRIUM) 100 mg capsule Take 1 Cap by mouth nightly. (Patient not taking: Reported on 7/12/2021) 90 Cap 4    Omeprazole delayed release (PRILOSEC D/R) 20 mg tablet Take 20 mg by mouth daily. (Patient not taking: Reported on 7/12/2021)      magnesium citrate solution Take 296 mL by mouth now.  Patient reports not taking (Patient not taking: Reported on 7/12/2021)         ROS    Objective:     Patient-Reported Vitals 3/24/2022   Patient-Reported Weight 203lb   Patient-Reported Height -   Patient-Reported Pulse - Patient-Reported Temperature -   Patient-Reported Systolic  -   Patient-Reported Diastolic -   Patient-Reported LMP -        [INSTRUCTIONS:  \"[x]\" Indicates a positive item  \"[]\" Indicates a negative item  -- DELETE ALL ITEMS NOT EXAMINED]    Constitutional: [x] Appears well-developed and well-nourished [x] No apparent distress      [] Abnormal -     Mental status: [x] Alert and awake  [x] Oriented to person/place/time [x] Able to follow commands    [] Abnormal -     Eyes:   EOM    [x]  Normal    [] Abnormal -   Sclera  [x]  Normal    [] Abnormal -          Discharge [x]  None visible   [] Abnormal -     HENT: [x] Normocephalic, atraumatic  [] Abnormal -   [x] Mouth/Throat: Mucous membranes are moist    External Ears [x] Normal  [] Abnormal -    Neck: [x] No visualized mass [] Abnormal -     Pulmonary/Chest: [x] Respiratory effort normal   [x] No visualized signs of difficulty breathing or respiratory distress        [] Abnormal -      Musculoskeletal:   [x] Normal gait with no signs of ataxia         [x] Normal range of motion of neck        [] Abnormal -     Neurological:        [x] No Facial Asymmetry (Cranial nerve 7 motor function) (limited exam due to video visit)          [x] No gaze palsy        [] Abnormal -          Skin:        [x] No significant exanthematous lesions or discoloration noted on facial skin         [] Abnormal -            Psychiatric:       [x] Normal Affect [] Abnormal -        [x] No Hallucinations    Other pertinent observable physical exam findings:-        We discussed the expected course, resolution and complications of the diagnosis(es) in detail. Medication risks, benefits, costs, interactions, and alternatives were discussed as indicated. I advised her to contact the office if her condition worsens, changes or fails to improve as anticipated. She expressed understanding with the diagnosis(es) and plan.        John Jacques, was evaluated through a synchronous (real-time) audio-video encounter. The patient (or guardian if applicable) is aware that this is a billable service, which includes applicable co-pays. Verbal consent to proceed has been obtained. The visit was conducted pursuant to the emergency declaration under the [address] [place] [address], [address] [place] [address] authority and the [employer] General Act. Patient identification was verified, and a caregiver was present when appropriate. The patient was located at home in a state where the provider was licensed to provide care.       Serjio Campos MD

## 2022-05-02 ENCOUNTER — CLINICAL SUPPORT (OUTPATIENT)
Dept: FAMILY MEDICINE CLINIC | Age: 57
End: 2022-05-02
Payer: COMMERCIAL

## 2022-05-02 VITALS — WEIGHT: 196 LBS | BODY MASS INDEX: 28.06 KG/M2 | HEIGHT: 70 IN | RESPIRATION RATE: 20 BRPM | TEMPERATURE: 97 F

## 2022-05-02 DIAGNOSIS — E53.8 VITAMIN B 12 DEFICIENCY: Primary | ICD-10-CM

## 2022-05-02 PROCEDURE — 96372 THER/PROPH/DIAG INJ SC/IM: CPT | Performed by: FAMILY MEDICINE

## 2022-05-02 RX ORDER — CYANOCOBALAMIN 1000 UG/ML
1000 INJECTION, SOLUTION INTRAMUSCULAR; SUBCUTANEOUS
Status: COMPLETED | OUTPATIENT
Start: 2022-05-02 | End: 2022-05-02

## 2022-05-02 RX ADMIN — CYANOCOBALAMIN 1000 MCG: 1000 INJECTION, SOLUTION INTRAMUSCULAR; SUBCUTANEOUS at 09:26

## 2022-05-02 NOTE — PROGRESS NOTES
Patient here for injection. After obtaining consent, and per orders of  injection of B12 given by atul Mccullough as follows:     Dose amount:  1.0 ml  Injection site:  deltoid ( left)  Route:  IM    Patient tolerated injection well. No adverse reactions noted.      Lot    Exp 11/23 87 Tiana Cornell

## 2022-05-04 ENCOUNTER — TELEPHONE (OUTPATIENT)
Dept: SURGERY | Age: 57
End: 2022-05-04

## 2022-05-04 NOTE — TELEPHONE ENCOUNTER
Called, no answer. mtc to sched NP/Consult.     Referred by  Dr. Junior Jarret Pat/ lipoma lower back

## 2022-05-11 ENCOUNTER — TELEPHONE (OUTPATIENT)
Dept: SURGERY | Age: 57
End: 2022-05-11

## 2022-05-11 NOTE — TELEPHONE ENCOUNTER
Called 2nd attempt, no answer.  VM Full-left a SMS notification with office number to sched NP/Consult.     Referred by  Dr. John Pat/ lipoma lower back

## 2022-05-16 NOTE — PROGRESS NOTES
Kuhs Recio is a ,  55 y.o. female BLACK/   who presents for her annual checkup. She is having no significant problems. She reports increased hair growth on her face to the point of shaving. Menstrual status:    Her periods are nonexistent. She denies dysmenorrhea. She reports no premenstrual symptoms. The patient is not using HRT. Contraception:    The current method of family planning is post menopausal status. Sexual history:    She  reports previously being sexually active and has had partner(s) who are male. Medical conditions:    Since her last annual GYN exam about three or more years ago, she has had the following changes in her health history: none. Pap and Mammogram History:    Her most recent Pap smear 16 was normal HPV neg. obtained 6 year(s) ago. The patient had her mammogram today in our office. Breast Cancer History/Substance Abuse:    She has no and a family history of breast cancer. Osteoporosis History:    Family history does not include a first or second degree relative with osteopenia or osteoporosis. Past Medical History:   Diagnosis Date    Anemia     Hypertension     Menorrhagia      Past Surgical History:   Procedure Laterality Date    HX  SECTION      HX GASTRIC BYPASS      HX NEPHRECTOMY       Current Outpatient Medications   Medication Sig Dispense Refill    ergocalciferol (ERGOCALCIFEROL) 1,250 mcg (50,000 unit) capsule Take 1 Capsule by mouth every seven (7) days for 24 doses. 24 Capsule 0    cyanocobalamin (Vitamin B-12) 1,000 mcg/mL injection 1 mL by IntraMUSCular route every thirty (30) days. 10 mL 2    triamterene-hydroCHLOROthiazide (MAXZIDE) 75-50 mg per tablet Take 1 Tablet by mouth daily. 90 Tablet 1    clobetasoL (TEMOVATE) 0.05 % external solution Apply to scalp once daily 50 mL 2    progesterone (PROMETRIUM) 100 mg capsule Take 1 Cap by mouth nightly.  (Patient not taking: Reported on 7/12/2021) 90 Cap 4    Omeprazole delayed release (PRILOSEC D/R) 20 mg tablet Take 20 mg by mouth daily. (Patient not taking: Reported on 7/12/2021)      magnesium citrate solution Take 296 mL by mouth now. Patient reports not taking (Patient not taking: Reported on 7/12/2021)       Allergies: Patient has no known allergies. Social History     Socioeconomic History    Marital status:      Spouse name: Not on file    Number of children: Not on file    Years of education: Not on file    Highest education level: Not on file   Occupational History    Not on file   Tobacco Use    Smoking status: Never Smoker    Smokeless tobacco: Never Used   Vaping Use    Vaping Use: Never used   Substance and Sexual Activity    Alcohol use: No    Drug use: No    Sexual activity: Not Currently     Partners: Male   Other Topics Concern    Not on file   Social History Narrative    Not on file     Social Determinants of Health     Financial Resource Strain:     Difficulty of Paying Living Expenses: Not on file   Food Insecurity:     Worried About Running Out of Food in the Last Year: Not on file    Reuben of Food in the Last Year: Not on file   Transportation Needs:     Lack of Transportation (Medical): Not on file    Lack of Transportation (Non-Medical):  Not on file   Physical Activity:     Days of Exercise per Week: Not on file    Minutes of Exercise per Session: Not on file   Stress:     Feeling of Stress : Not on file   Social Connections:     Frequency of Communication with Friends and Family: Not on file    Frequency of Social Gatherings with Friends and Family: Not on file    Attends Jainism Services: Not on file    Active Member of Clubs or Organizations: Not on file    Attends Club or Organization Meetings: Not on file    Marital Status: Not on file   Intimate Partner Violence:     Fear of Current or Ex-Partner: Not on file    Emotionally Abused: Not on file    Physically Abused: Not on file    Sexually Abused: Not on file   Housing Stability:     Unable to Pay for Housing in the Last Year: Not on file    Number of Places Lived in the Last Year: Not on file    Unstable Housing in the Last Year: Not on file     Tobacco History:  reports that she has never smoked. She has never used smokeless tobacco.  Alcohol Abuse:  reports no history of alcohol use. Drug Abuse:  reports no history of drug use.   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    Uterine leiomyoma D25.9    Weight gain, abnormal R63.5    Gastroesophageal reflux disease without esophagitis K21.9         Review of Systems - History obtained from the patient  Constitutional: negative for weight loss, fever, night sweats  HEENT: negative for hearing loss, earache, congestion, snoring, sorethroat  CV: negative for chest pain, palpitations, edema  Resp: negative for cough, shortness of breath, wheezing  GI: negative for change in bowel habits, abdominal pain, black or bloody stools  : negative for frequency, dysuria, hematuria, vaginal discharge  MSK: negative for back pain, joint pain, muscle pain  Breast: negative for breast lumps, nipple discharge, galactorrhea  Skin :negative for itching, rash, hives  Neuro: negative for dizziness, headache, confusion, weakness  Psych: negative for anxiety, depression, change in mood  Heme/lymph: negative for bleeding, bruising, pallor    Physical Exam    Visit Vitals  /70   Pulse 63   Wt 193 lb 12.8 oz (87.9 kg)   BMI 27.81 kg/m²     Constitutional  · Appearance: well-nourished, well developed, alert, in no acute distress    HENT  · Head and Face: appears normal    Neck  · Inspection/Palpation: normal appearance, no masses or tenderness  · Lymph Nodes: no lymphadenopathy present  · Thyroid: gland size normal, nontender, no nodules or masses present on palpation    Chest  · Respiratory Effort: breathing normal  · Auscultation: normal breath sounds    Cardiovascular  · Heart:  · Auscultation: regular rate and rhythm without murmur    Breasts  · Inspection of Breasts: breasts symmetrical, no skin changes, no discharge present, nipple appearance normal, no skin retraction present  · Palpation of Breasts and Axillae: no masses present on palpation, no breast tenderness  · Axillary Lymph Nodes: no lymphadenopathy present    Gastrointestinal  · Abdominal Examination: abdomen non-tender to palpation, normal bowel sounds, no masses present  · Liver and spleen: no hepatomegaly present, spleen not palpable  · Hernias: no hernias identified    Skin  · General Inspection: no rash, no lesions identified    Neurologic/Psychiatric  · Mental Status:  · Orientation: grossly oriented to person, place and time  · Mood and Affect: mood normal, affect appropriate    Genitourinary  · External Genitalia: normal appearance for age, no discharge present, no tenderness present, no inflammatory lesions present, no masses present, no atrophy present  · Vagina: normal vaginal vault without central or paravaginal defects, no discharge present, no inflammatory lesions present, no masses present  · Bladder: non-tender to palpation  · Urethra: appears normal  · Cervix: normal   · Uterus: normal size, shape and consistency  · Adnexa: no adnexal tenderness present, no adnexal masses present  · Perineum: perineum within normal limits, no evidence of trauma, no rashes or skin lesions present  · Anus: anus within normal limits, no hemorrhoids present  · Inguinal Lymph Nodes: no lymphadenopathy present    Assessment:   Routine gynecologic examination  Her current medical status is satisfactory with no evidence of significant gynecologic issues.   Hirsutism  Plan:  Counseled re: diet, exercise, healthy lifestyle  Return for yearly wellness visits  Rec annual mammogram  Pap/HPV  Check testosterone and 17 OHP  Start Vaniqa

## 2022-05-17 DIAGNOSIS — I10 ESSENTIAL HYPERTENSION: ICD-10-CM

## 2022-05-19 ENCOUNTER — TELEPHONE (OUTPATIENT)
Dept: SURGERY | Age: 57
End: 2022-05-19

## 2022-05-19 ENCOUNTER — OFFICE VISIT (OUTPATIENT)
Dept: OBGYN CLINIC | Age: 57
End: 2022-05-19

## 2022-05-19 VITALS
HEART RATE: 63 BPM | SYSTOLIC BLOOD PRESSURE: 105 MMHG | DIASTOLIC BLOOD PRESSURE: 70 MMHG | BODY MASS INDEX: 27.81 KG/M2 | WEIGHT: 193.8 LBS

## 2022-05-19 DIAGNOSIS — Z01.419 ENCOUNTER FOR ANNUAL ROUTINE GYNECOLOGICAL EXAMINATION: Primary | ICD-10-CM

## 2022-05-19 DIAGNOSIS — L68.0 HIRSUTISM: ICD-10-CM

## 2022-05-19 PROCEDURE — 99396 PREV VISIT EST AGE 40-64: CPT | Performed by: OBSTETRICS & GYNECOLOGY

## 2022-05-19 RX ORDER — TRIAMTERENE AND HYDROCHLOROTHIAZIDE 75; 50 MG/1; MG/1
1 TABLET ORAL DAILY
Qty: 90 TABLET | Refills: 1 | Status: SHIPPED | OUTPATIENT
Start: 2022-05-19 | End: 2022-09-27 | Stop reason: SDUPTHER

## 2022-05-19 NOTE — TELEPHONE ENCOUNTER
Final attempt call #3 to set up NP/Consult, No answer, [facility]. Faxed notice of this to referring provider.       Referred by  Dr. Jamaal Pat/ lipoma lower back

## 2022-05-22 LAB
CYTOLOGIST CVX/VAG CYTO: NORMAL
CYTOLOGY CVX/VAG DOC CYTO: NORMAL
CYTOLOGY CVX/VAG DOC THIN PREP: NORMAL
CYTOLOGY HISTORY:: NORMAL
DX ICD CODE: NORMAL
HPV I/H RISK 4 DNA CVX QL PROBE+SIG AMP: NEGATIVE
Lab: NORMAL
OTHER STN SPEC: NORMAL
STAT OF ADQ CVX/VAG CYTO-IMP: NORMAL

## 2022-05-27 LAB
17OHP SERPL-MCNC: 15 NG/DL
TESTOST FREE SERPL-MCNC: 1 PG/ML (ref 0–4.2)
TESTOST SERPL-MCNC: 17 NG/DL (ref 4–50)

## 2022-07-27 ENCOUNTER — VIRTUAL VISIT (OUTPATIENT)
Dept: FAMILY MEDICINE CLINIC | Age: 57
End: 2022-07-27
Payer: COMMERCIAL

## 2022-07-27 DIAGNOSIS — E55.9 VITAMIN D DEFICIENCY: ICD-10-CM

## 2022-07-27 DIAGNOSIS — Z13.220 ENCOUNTER FOR LIPID SCREENING FOR CARDIOVASCULAR DISEASE: ICD-10-CM

## 2022-07-27 DIAGNOSIS — L72.0 INFECTED EPIDERMOID CYST: Primary | ICD-10-CM

## 2022-07-27 DIAGNOSIS — Z13.6 ENCOUNTER FOR LIPID SCREENING FOR CARDIOVASCULAR DISEASE: ICD-10-CM

## 2022-07-27 DIAGNOSIS — D50.9 IRON DEFICIENCY ANEMIA, UNSPECIFIED IRON DEFICIENCY ANEMIA TYPE: ICD-10-CM

## 2022-07-27 DIAGNOSIS — R74.8 ELEVATED LIVER ENZYMES: ICD-10-CM

## 2022-07-27 DIAGNOSIS — Z11.59 ENCOUNTER FOR HEPATITIS C SCREENING TEST FOR LOW RISK PATIENT: ICD-10-CM

## 2022-07-27 DIAGNOSIS — I10 ESSENTIAL HYPERTENSION: ICD-10-CM

## 2022-07-27 DIAGNOSIS — L08.9 INFECTED EPIDERMOID CYST: Primary | ICD-10-CM

## 2022-07-27 DIAGNOSIS — E53.8 VITAMIN B 12 DEFICIENCY: ICD-10-CM

## 2022-07-27 PROCEDURE — 99214 OFFICE O/P EST MOD 30 MIN: CPT | Performed by: FAMILY MEDICINE

## 2022-07-27 RX ORDER — DOXYCYCLINE 100 MG/1
100 CAPSULE ORAL 2 TIMES DAILY
Qty: 20 CAPSULE | Refills: 0 | Status: SHIPPED | OUTPATIENT
Start: 2022-07-27 | End: 2022-08-06

## 2022-07-27 NOTE — PROGRESS NOTES
1 week change in size , tendConsent: Carlos Rizzo, who was seen by synchronous (real-time) audio-video technology, and/or her healthcare decision maker, is aware that this patient-initiated, Telehealth encounter on 7/27/2022 is a billable service, with coverage as determined by her insurance carrier. She is aware that she may receive a bill and has provided verbal consent to proceed: Yes. Assessment & Plan:   Diagnoses and all orders for this visit:    1. Infected epidermoid cyst  -     doxycycline (VIBRAMYCIN) 100 mg capsule; Take 1 Capsule by mouth two (2) times a day for 10 days. -     CBC WITH AUTOMATED DIFF; Future    2. Vitamin B 12 deficiency  -     VITAMIN B12 & FOLATE; Future    3. Vitamin D deficiency  -     VITAMIN D, 25 HYDROXY; Future    4. Encounter for hepatitis C screening test for low risk patient  -     HEPATITIS C AB; Future    5. Iron deficiency anemia, unspecified iron deficiency anemia type  -     IRON PROFILE; Future  -     CBC WITH AUTOMATED DIFF; Future  -     METABOLIC PANEL, COMPREHENSIVE; Future    6. Encounter for lipid screening for cardiovascular disease  -     METABOLIC PANEL, COMPREHENSIVE; Future  -     LIPID PANEL; Future    7. Elevated liver enzymes  -     HEPATITIS C AB; Future  -     THYROID CASCADE PROFILE; Future  -     METABOLIC PANEL, COMPREHENSIVE; Future          Follow-up and Dispositions    Return in about 2 weeks (around 8/10/2022) for discuss labs. I ADVISED PATIENT TO GO TO ER IF SYMPTOMS WORSEN , CHANGE OR FAILS TO IMPROVE. I spent at least 15 minutes with this established patient, and >50% of the time was spent counseling and/or coordinating care regarding SEE BELOW  712  Subjective:   Carlos Rizzo is a 55 y.o. 1965 female  new patient, who was seen for Follow-up (Blood work results   ) and Other (Also wants to talk about cyst on back  is painful not taking medication for pain /)          1.  Infected epidermoid cyst  Patient reports having lump at the lower back. This has been going on for past several months. Recently noticed change in size and started getting tender. Patient has referral for surgeon. I will start antibiotic. Advised to make appointment with surgery for removal.    2. Vitamin B 12 deficiency  Check labs  3. Vitamin D deficiency  Check vitamin D level    4. Encounter for hepatitis C screening test for low risk patient  Screen for hep C    5. Iron deficiency anemia, unspecified iron deficiency anemia type  Check iron levels    6. Encounter for lipid screening for cardiovascular disease    Check cholesterol    7. Elevated liver enzymes  Liver enzymes elevated. Check labs    Prior to Admission medications    Medication Sig Start Date End Date Taking? Authorizing Provider   doxycycline (VIBRAMYCIN) 100 mg capsule Take 1 Capsule by mouth two (2) times a day for 10 days. 7/27/22 8/6/22 Yes Richard Cash MD   triamterene-hydroCHLOROthiazide (MAXZIDE) 75-50 mg per tablet Take 1 Tablet by mouth daily. 5/19/22  Yes Yessi Hughes MD   [facility]) 13.9 % topical cream Apply  to affected area two (2) times a day. apply thin layer to affected areas space application by 8 hour 7/70/19  Yes Soni Jamil MD   ergocalciferol (ERGOCALCIFEROL) 1,250 mcg (50,000 unit) capsule Take 1 Capsule by mouth every seven (7) days for 24 doses. 3/24/22 9/2/22 Yes Yessi Hughes MD   cyanocobalamin (Vitamin B-12) 1,000 mcg/mL injection 1 mL by IntraMUSCular route every thirty (30) days.  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 20 Capsule 0    triamterene-hydroCHLOROthiazide (MAXZIDE) 75-50 mg per tablet Take 1 Tablet by mouth daily. 90 Tablet 1    eflornithine (VANIQA) 13.9 % topical cream Apply  to affected area two (2) times a day. apply thin layer to affected areas space application by 8 hour 30 g 4    ergocalciferol (ERGOCALCIFEROL) 1,250 mcg (50,000 unit) capsule Take 1 Capsule by mouth every seven (7) days for 24 doses. 24 Capsule 0    cyanocobalamin (Vitamin B-12) 1,000 mcg/mL injection 1 mL by IntraMUSCular route every thirty (30) days. 10 mL 2    clobetasoL (TEMOVATE) 0.05 % external solution Apply to scalp once daily 50 mL 2     No Known Allergies  Past Medical History:   Diagnosis Date    Anemia     Hypertension     Menorrhagia      Past Surgical History:   Procedure Laterality Date    HX  SECTION      HX GASTRIC BYPASS      HX NEPHRECTOMY       Family History   Problem Relation Age of Onset    Hypertension Mother     Diabetes Mother     Cancer Mother         cervical    Heart Disease Mother     Prostate Cancer Father     Hypertension Father     Breast Cancer Paternal Aunt     Uterine Cancer Sister     Diabetes Sister      Social History     Tobacco Use    Smoking status: Never    Smokeless tobacco: Never   Substance Use Topics    Alcohol use: No       Review of Systems   Constitutional: Negative. HENT: Negative. Eyes: Negative. Respiratory: Negative. Cardiovascular: Negative. Gastrointestinal: Negative. Genitourinary: Negative. Musculoskeletal: Negative. Skin: Negative. Neurological: Negative. Endo/Heme/Allergies: Negative. Psychiatric/Behavioral: Negative. Objective:     Patient-Reported Vitals 7/27/2022   Patient-Reported Weight 180lb   Patient-Reported Height -   Patient-Reported Pulse -   Patient-Reported Temperature -   Patient-Reported Systolic  -   Patient-Reported Diastolic -   Patient-Reported LMP -        [INSTRUCTIONS:  \"[x]\" Indicates a positive item  \"[]\" Indicates a negative item  -- DELETE ALL ITEMS NOT EXAMINED]    Constitutional: [x] Appears well-developed and well-nourished [x] No apparent distress      [] Abnormal -     Mental status: [x] Alert and awake  [x] Oriented to person/place/time [x] Able to follow commands    [] Abnormal -     Eyes:   EOM    [x]  Normal    [] Abnormal -   Sclera  [x]  Normal    [] Abnormal -          Discharge [x]  None visible   [] Abnormal -     HENT: [x] Normocephalic, atraumatic  [] Abnormal -   [x] Mouth/Throat: Mucous membranes are moist    External Ears [x] Normal  [] Abnormal -    Neck: [x] No visualized mass [] Abnormal -     Pulmonary/Chest: [x] Respiratory effort normal   [x] No visualized signs of difficulty breathing or respiratory distress        [] Abnormal -      Musculoskeletal:   [x] Normal gait with no signs of ataxia         [x] Normal range of motion of neck        [] Abnormal -     Neurological:        [x] No Facial Asymmetry (Cranial nerve 7 motor function) (limited exam due to video visit)          [x] No gaze palsy        [] Abnormal -          Skin:        [x] No significant exanthematous lesions or discoloration noted on facial skin         [] Abnormal -            Psychiatric:       [x] Normal Affect [] Abnormal -        [x] No Hallucinations    Other pertinent observable physical exam findings:-    We discussed the expected course, resolution and complications of the diagnosis(es) in detail.   Medication risks, benefits, costs, interactions, and alternatives were discussed as indicated. I advised her to contact the office if her condition worsens, changes or fails to improve as anticipated. She expressed understanding with the diagnosis(es) and plan. Marian Olmedo is a 55 y.o. female  is being evaluated by a Virtual Visit (video visit) encounter to address concerns as mentioned above. A caregiver was present when appropriate. Due to this being a TeleHealth encounter ([facility]), evaluation of the following organ systems was limited: Vitals/Constitutional/EENT/Resp/CV/GI//MS/Neuro/Skin/Heme-Lymph-Imm. Pursuant to the emergency declaration under the [address] [place] [address], [address] [place] [address] authority and the [employer] and [employer] Act, this Virtual Visit was conducted with patient's (and/or legal guardian's) consent, to reduce the patient's risk of exposure to COVID-19 and provide necessary medical care. The patient (and/or legal guardian) has also been advised to contact this office for worsening conditions or problems, and seek emergency medical treatment and/or call 911 if deemed necessary. Patient identification was verified at the start of the visit: YES    Services were provided through a video synchronous discussion virtually to substitute for in-person clinic visit. Patient was located at their homes. Provider located in office    An electronic signature was used to authenticate this note.       Isac Glez MD    er, 1 week,

## 2022-07-27 NOTE — PROGRESS NOTES
1. Have you been to the ER, urgent care clinic since your last visit? Hospitalized since your last visit? No    2. Have you seen or consulted any other health care providers outside of the [address] [place] [address] since your last visit? Include any pap smears or colon screening.  No      Chief Complaint   Patient presents with    Follow-up     Blood work results       Other     Also wants to talk about cyst on back  is painful not taking medication for pain

## 2022-08-05 DIAGNOSIS — Z11.59 ENCOUNTER FOR HEPATITIS C SCREENING TEST FOR LOW RISK PATIENT: ICD-10-CM

## 2022-08-05 DIAGNOSIS — R74.8 ELEVATED LIVER ENZYMES: ICD-10-CM

## 2022-08-05 DIAGNOSIS — Z13.220 ENCOUNTER FOR LIPID SCREENING FOR CARDIOVASCULAR DISEASE: ICD-10-CM

## 2022-08-05 DIAGNOSIS — E53.8 VITAMIN B 12 DEFICIENCY: ICD-10-CM

## 2022-08-05 DIAGNOSIS — Z13.6 ENCOUNTER FOR LIPID SCREENING FOR CARDIOVASCULAR DISEASE: ICD-10-CM

## 2022-08-05 DIAGNOSIS — L72.0 INFECTED EPIDERMOID CYST: ICD-10-CM

## 2022-08-05 DIAGNOSIS — E55.9 VITAMIN D DEFICIENCY: ICD-10-CM

## 2022-08-05 DIAGNOSIS — D50.9 IRON DEFICIENCY ANEMIA, UNSPECIFIED IRON DEFICIENCY ANEMIA TYPE: ICD-10-CM

## 2022-08-05 DIAGNOSIS — L08.9 INFECTED EPIDERMOID CYST: ICD-10-CM

## 2022-08-05 LAB
25(OH)D3 SERPL-MCNC: 30.3 NG/ML (ref 30–100)
ALBUMIN SERPL-MCNC: 4 G/DL (ref 3.5–5)
ALBUMIN/GLOB SERPL: 0.9 {RATIO} (ref 1.1–2.2)
ALP SERPL-CCNC: 134 U/L (ref 45–117)
ALT SERPL-CCNC: 29 U/L (ref 12–78)
ANION GAP SERPL CALC-SCNC: 7 MMOL/L (ref 5–15)
AST SERPL-CCNC: 22 U/L (ref 15–37)
BASOPHILS # BLD: 0 K/UL (ref 0–0.1)
BASOPHILS NFR BLD: 1 % (ref 0–1)
BILIRUB SERPL-MCNC: 0.9 MG/DL (ref 0.2–1)
BUN SERPL-MCNC: 16 MG/DL (ref 6–20)
BUN/CREAT SERPL: 21 (ref 12–20)
CALCIUM SERPL-MCNC: 9.8 MG/DL (ref 8.5–10.1)
CHLORIDE SERPL-SCNC: 101 MMOL/L (ref 97–108)
CHOLEST SERPL-MCNC: 179 MG/DL
CO2 SERPL-SCNC: 31 MMOL/L (ref 21–32)
CREAT SERPL-MCNC: 0.76 MG/DL (ref 0.55–1.02)
DIFFERENTIAL METHOD BLD: NORMAL
EOSINOPHIL # BLD: 0.1 K/UL (ref 0–0.4)
EOSINOPHIL NFR BLD: 2 % (ref 0–7)
ERYTHROCYTE [DISTWIDTH] IN BLOOD BY AUTOMATED COUNT: 13.6 % (ref 11.5–14.5)
FOLATE SERPL-MCNC: 11.7 NG/ML (ref 5–21)
GLOBULIN SER CALC-MCNC: 4.3 G/DL (ref 2–4)
GLUCOSE SERPL-MCNC: 81 MG/DL (ref 65–100)
HCT VFR BLD AUTO: 39.9 % (ref 35–47)
HCV AB SERPL QL IA: NONREACTIVE
HDLC SERPL-MCNC: 75 MG/DL
HDLC SERPL: 2.4 {RATIO} (ref 0–5)
HGB BLD-MCNC: 13 G/DL (ref 11.5–16)
IMM GRANULOCYTES # BLD AUTO: 0 K/UL (ref 0–0.04)
IMM GRANULOCYTES NFR BLD AUTO: 0 % (ref 0–0.5)
IRON SATN MFR SERPL: 31 % (ref 20–50)
IRON SERPL-MCNC: 121 UG/DL (ref 35–150)
LDLC SERPL CALC-MCNC: 90 MG/DL (ref 0–100)
LYMPHOCYTES # BLD: 1.5 K/UL (ref 0.8–3.5)
LYMPHOCYTES NFR BLD: 35 % (ref 12–49)
MCH RBC QN AUTO: 29.1 PG (ref 26–34)
MCHC RBC AUTO-ENTMCNC: 32.6 G/DL (ref 30–36.5)
MCV RBC AUTO: 89.5 FL (ref 80–99)
MONOCYTES # BLD: 0.4 K/UL (ref 0–1)
MONOCYTES NFR BLD: 9 % (ref 5–13)
NEUTS SEG # BLD: 2.2 K/UL (ref 1.8–8)
NEUTS SEG NFR BLD: 53 % (ref 32–75)
NRBC # BLD: 0 K/UL (ref 0–0.01)
NRBC BLD-RTO: 0 PER 100 WBC
PLATELET # BLD AUTO: 302 K/UL (ref 150–400)
PMV BLD AUTO: 10.9 FL (ref 8.9–12.9)
POTASSIUM SERPL-SCNC: 3.6 MMOL/L (ref 3.5–5.1)
PROT SERPL-MCNC: 8.3 G/DL (ref 6.4–8.2)
RBC # BLD AUTO: 4.46 M/UL (ref 3.8–5.2)
SODIUM SERPL-SCNC: 139 MMOL/L (ref 136–145)
TIBC SERPL-MCNC: 390 UG/DL (ref 250–450)
TRIGL SERPL-MCNC: 70 MG/DL (ref ?–150)
VIT B12 SERPL-MCNC: 156 PG/ML (ref 193–986)
VLDLC SERPL CALC-MCNC: 14 MG/DL
WBC # BLD AUTO: 4.1 K/UL (ref 3.6–11)

## 2022-08-06 LAB — TSH SERPL-ACNC: 0.72 UIU/ML (ref 0.45–4.5)

## 2022-08-08 DIAGNOSIS — R74.8 ELEVATED LIVER ENZYMES: Primary | ICD-10-CM

## 2022-08-08 DIAGNOSIS — R74.8 ELEVATED ALKALINE PHOSPHATASE LEVEL: ICD-10-CM

## 2022-08-08 NOTE — PROGRESS NOTES
Please inform patient, I have ordered further labs and ultrasound to evaluate elevated liver enzymes.   Thanks

## 2022-08-09 DIAGNOSIS — R74.8 ELEVATED LIVER ENZYMES: ICD-10-CM

## 2022-08-09 DIAGNOSIS — R74.8 ELEVATED ALKALINE PHOSPHATASE LEVEL: ICD-10-CM

## 2022-08-09 NOTE — PROGRESS NOTES
Scan is tomorrow. Also, Antibiotic was too strong. Had stomach cramping. She stopped taking it. Would you consider calling it something else? Please advise.

## 2022-08-10 ENCOUNTER — HOSPITAL ENCOUNTER (OUTPATIENT)
Dept: ULTRASOUND IMAGING | Age: 57
Discharge: HOME | End: 2022-08-10
Attending: FAMILY MEDICINE
Payer: COMMERCIAL

## 2022-08-10 DIAGNOSIS — R74.8 ELEVATED ALKALINE PHOSPHATASE LEVEL: ICD-10-CM

## 2022-08-10 DIAGNOSIS — R74.8 ELEVATED LIVER ENZYMES: ICD-10-CM

## 2022-08-10 LAB
ALBUMIN SERPL-MCNC: 3.7 G/DL (ref 3.5–5)
ALBUMIN/GLOB SERPL: 0.9 {RATIO} (ref 1.1–2.2)
ALP SERPL-CCNC: 121 U/L (ref 45–117)
ALT SERPL-CCNC: 25 U/L (ref 12–78)
AST SERPL-CCNC: 17 U/L (ref 15–37)
BILIRUB DIRECT SERPL-MCNC: 0.1 MG/DL (ref 0–0.2)
BILIRUB SERPL-MCNC: 0.5 MG/DL (ref 0.2–1)
GGT SERPL-CCNC: 14 U/L (ref 5–55)
GLOBULIN SER CALC-MCNC: 3.9 G/DL (ref 2–4)
HAV IGM SER QL: NONREACTIVE
HBV CORE IGM SER QL: NONREACTIVE
HBV SURFACE AG SER QL: <0.1 INDEX
HBV SURFACE AG SER QL: NEGATIVE
HCV AB SERPL QL IA: NONREACTIVE
PROT SERPL-MCNC: 7.6 G/DL (ref 6.4–8.2)
SP1: NORMAL
SP2: NORMAL
SP3: NORMAL

## 2022-08-10 PROCEDURE — 76700 US EXAM ABDOM COMPLETE: CPT

## 2022-09-27 DIAGNOSIS — I10 ESSENTIAL HYPERTENSION: ICD-10-CM

## 2022-09-30 DIAGNOSIS — I10 ESSENTIAL HYPERTENSION: ICD-10-CM

## 2022-09-30 RX ORDER — TRIAMTERENE AND HYDROCHLOROTHIAZIDE 75; 50 MG/1; MG/1
1 TABLET ORAL DAILY
Qty: 90 TABLET | Refills: 1 | OUTPATIENT
Start: 2022-09-30

## 2022-09-30 RX ORDER — TRIAMTERENE AND HYDROCHLOROTHIAZIDE 75; 50 MG/1; MG/1
1 TABLET ORAL DAILY
Qty: 90 TABLET | Refills: 1 | Status: SHIPPED | OUTPATIENT
Start: 2022-09-30

## 2022-09-30 NOTE — TELEPHONE ENCOUNTER
PCP: Angie Horan MD    Last appt: 7/27/2022  No future appointments. Requested Prescriptions     Pending Prescriptions Disp Refills    triamterene-hydroCHLOROthiazide (MAXZIDE) 75-50 mg per tablet 90 Tablet 1     Sig: Take 1 Tablet by mouth daily.        Prior labs and Blood pressures:  BP Readings from Last 3 Encounters:   05/19/22 105/70   03/25/22 104/70   06/04/20 92/61     Lab Results   Component Value Date/Time    Sodium 139 08/05/2022 11:19 AM    Potassium 3.6 08/05/2022 11:19 AM    Chloride 101 08/05/2022 11:19 AM    CO2 31 08/05/2022 11:19 AM    Anion gap 7 08/05/2022 11:19 AM    Glucose 81 08/05/2022 11:19 AM    BUN 16 08/05/2022 11:19 AM    Creatinine 0.76 08/05/2022 11:19 AM    BUN/Creatinine ratio 21 (H) 08/05/2022 11:19 AM    GFR est AA >60 08/05/2022 11:19 AM    GFR est non-AA >60 08/05/2022 11:19 AM    Calcium 9.8 08/05/2022 11:19 AM     Lab Results   Component Value Date/Time    Hemoglobin A1c 5.7 (H) 03/29/2016 09:06 AM     Lab Results   Component Value Date/Time    Cholesterol, total 179 08/05/2022 11:19 AM    HDL Cholesterol 75 08/05/2022 11:19 AM    LDL, calculated 90 08/05/2022 11:19 AM    VLDL, calculated 14 08/05/2022 11:19 AM    Triglyceride 70 08/05/2022 11:19 AM    CHOL/HDL Ratio 2.4 08/05/2022 11:19 AM     Lab Results   Component Value Date/Time    Vitamin D 25-Hydroxy 30.3 08/05/2022 11:19 AM       Lab Results   Component Value Date/Time    TSH 0.725 08/05/2022 11:19 AM    TSH 1.190 04/01/2019 04:53 PM

## 2023-03-11 ENCOUNTER — HOSPITAL ENCOUNTER (INPATIENT)
Age: 58
LOS: 6 days | Discharge: HOME | End: 2023-03-17
Attending: EMERGENCY MEDICINE | Admitting: HOSPITALIST
Payer: COMMERCIAL

## 2023-03-11 ENCOUNTER — APPOINTMENT (OUTPATIENT)
Dept: CT IMAGING | Age: 58
End: 2023-03-11
Attending: UROLOGY
Payer: COMMERCIAL

## 2023-03-11 ENCOUNTER — APPOINTMENT (OUTPATIENT)
Dept: CT IMAGING | Age: 58
End: 2023-03-11
Attending: STUDENT IN AN ORGANIZED HEALTH CARE EDUCATION/TRAINING PROGRAM
Payer: COMMERCIAL

## 2023-03-11 DIAGNOSIS — I10 ESSENTIAL HYPERTENSION: ICD-10-CM

## 2023-03-11 DIAGNOSIS — N13.30 HYDRONEPHROSIS, UNSPECIFIED HYDRONEPHROSIS TYPE: ICD-10-CM

## 2023-03-11 DIAGNOSIS — N15.1 RENAL ABSCESS, RIGHT: ICD-10-CM

## 2023-03-11 DIAGNOSIS — N13.2 HYDRONEPHROSIS WITH URINARY OBSTRUCTION DUE TO RENAL CALCULUS: ICD-10-CM

## 2023-03-11 DIAGNOSIS — N15.1 RENAL ABSCESS: ICD-10-CM

## 2023-03-11 DIAGNOSIS — N20.0 NEPHROLITHIASIS: Primary | ICD-10-CM

## 2023-03-11 DIAGNOSIS — R82.71 BACTERIURIA: ICD-10-CM

## 2023-03-11 PROBLEM — N12 PYELONEPHRITIS: Status: ACTIVE | Noted: 2023-03-11

## 2023-03-11 LAB
ALBUMIN SERPL-MCNC: 4 G/DL (ref 3.5–5.2)
ALBUMIN/GLOB SERPL: 0.9 (ref 1.1–2.2)
ALP SERPL-CCNC: 172 U/L (ref 35–104)
ALT SERPL-CCNC: 16 U/L (ref 10–35)
ANION GAP SERPL CALC-SCNC: 11 MMOL/L (ref 5–15)
APPEARANCE UR: CLEAR
AST SERPL-CCNC: 31 U/L (ref 10–35)
BACTERIA URNS QL MICRO: ABNORMAL /HPF
BASOPHILS # BLD: 0 K/UL (ref 0–1)
BASOPHILS NFR BLD: 0 % (ref 0–1)
BILIRUB SERPL-MCNC: 0.4 MG/DL (ref 0.2–1)
BILIRUB UR QL: NEGATIVE
BUN SERPL-MCNC: 11 MG/DL (ref 6–20)
BUN/CREAT SERPL: 17 (ref 12–20)
CALCIUM SERPL-MCNC: 9.4 MG/DL (ref 8.6–10)
CHLORIDE SERPL-SCNC: 97 MMOL/L (ref 98–107)
CO2 SERPL-SCNC: 30 MMOL/L (ref 22–29)
COLOR UR: ABNORMAL
CREAT SERPL-MCNC: 0.66 MG/DL (ref 0.5–0.9)
DIFFERENTIAL METHOD BLD: ABNORMAL
EOSINOPHIL # BLD: 0.1 K/UL (ref 0–0.4)
EOSINOPHIL NFR BLD: 1 %
EPITH CASTS URNS QL MICRO: ABNORMAL /LPF
ERYTHROCYTE [DISTWIDTH] IN BLOOD BY AUTOMATED COUNT: 13.6 % (ref 11.5–14.5)
GLOBULIN SER CALC-MCNC: 4.7 G/DL (ref 2–4)
GLUCOSE SERPL-MCNC: 86 MG/DL (ref 65–100)
GLUCOSE UR STRIP.AUTO-MCNC: NEGATIVE MG/DL
HCT VFR BLD AUTO: 38.3 % (ref 35–47)
HGB BLD-MCNC: 12.2 G/DL (ref 11.5–16)
HGB UR QL STRIP: ABNORMAL
IMM GRANULOCYTES # BLD AUTO: 0 K/UL (ref 0–0.04)
IMM GRANULOCYTES NFR BLD AUTO: 0 % (ref 0–0.5)
KETONES UR QL STRIP.AUTO: NEGATIVE MG/DL
LEUKOCYTE ESTERASE UR QL STRIP.AUTO: ABNORMAL
LYMPHOCYTES # BLD: 1.7 K/UL (ref 0.8–3.5)
LYMPHOCYTES NFR BLD: 24 % (ref 12–49)
MCH RBC QN AUTO: 28.6 PG (ref 26–34)
MCHC RBC AUTO-ENTMCNC: 31.9 G/DL (ref 30–36.5)
MCV RBC AUTO: 89.9 FL (ref 80–99)
MONOCYTES # BLD: 0.8 K/UL (ref 0–1)
MONOCYTES NFR BLD: 11 % (ref 5–13)
NEUTS SEG # BLD: 4.7 K/UL (ref 1.8–8)
NEUTS SEG NFR BLD: 64 % (ref 32–75)
NITRITE UR QL STRIP.AUTO: NEGATIVE
NRBC # BLD: 0 K/UL (ref 0–0.01)
NRBC BLD-RTO: 0 PER 100 WBC
PH UR STRIP: 6.5 (ref 5–8)
PLATELET # BLD AUTO: 236 K/UL (ref 150–400)
PMV BLD AUTO: 11.2 FL (ref 8.9–12.9)
POTASSIUM SERPL-SCNC: 4.1 MMOL/L (ref 3.5–5.1)
PROT SERPL-MCNC: 8.7 G/DL (ref 6.4–8.3)
PROT UR STRIP-MCNC: NEGATIVE MG/DL
RBC # BLD AUTO: 4.26 M/UL (ref 3.8–5.2)
RBC #/AREA URNS HPF: ABNORMAL /HPF
SODIUM SERPL-SCNC: 138 MMOL/L (ref 136–145)
SP GR UR REFRACTOMETRY: 1.01 (ref 1–1.03)
UA: UC IF INDICATED,UAUC: ABNORMAL
UROBILINOGEN UR QL STRIP.AUTO: 0.2 EU/DL (ref 0.2–1)
WBC # BLD AUTO: 7.4 K/UL (ref 3.6–11)
WBC URNS QL MICRO: ABNORMAL /HPF (ref 0–4)

## 2023-03-11 PROCEDURE — 74176 CT ABD & PELVIS W/O CONTRAST: CPT

## 2023-03-11 PROCEDURE — 65270000029 HC RM PRIVATE

## 2023-03-11 PROCEDURE — 96375 TX/PRO/DX INJ NEW DRUG ADDON: CPT

## 2023-03-11 PROCEDURE — 85025 COMPLETE CBC W/AUTO DIFF WBC: CPT

## 2023-03-11 PROCEDURE — 74011250637 HC RX REV CODE- 250/637: Performed by: STUDENT IN AN ORGANIZED HEALTH CARE EDUCATION/TRAINING PROGRAM

## 2023-03-11 PROCEDURE — 80053 COMPREHEN METABOLIC PANEL: CPT

## 2023-03-11 PROCEDURE — 74011000636 HC RX REV CODE- 636: Performed by: INTERNAL MEDICINE

## 2023-03-11 PROCEDURE — 81001 URINALYSIS AUTO W/SCOPE: CPT

## 2023-03-11 PROCEDURE — 74177 CT ABD & PELVIS W/CONTRAST: CPT

## 2023-03-11 PROCEDURE — 36415 COLL VENOUS BLD VENIPUNCTURE: CPT

## 2023-03-11 PROCEDURE — 74011250636 HC RX REV CODE- 250/636: Performed by: STUDENT IN AN ORGANIZED HEALTH CARE EDUCATION/TRAINING PROGRAM

## 2023-03-11 PROCEDURE — 99285 EMERGENCY DEPT VISIT HI MDM: CPT

## 2023-03-11 PROCEDURE — 96374 THER/PROPH/DIAG INJ IV PUSH: CPT

## 2023-03-11 PROCEDURE — 74011000250 HC RX REV CODE- 250: Performed by: STUDENT IN AN ORGANIZED HEALTH CARE EDUCATION/TRAINING PROGRAM

## 2023-03-11 RX ORDER — ONDANSETRON 2 MG/ML
4 INJECTION INTRAMUSCULAR; INTRAVENOUS ONCE
Status: COMPLETED | OUTPATIENT
Start: 2023-03-11 | End: 2023-03-11

## 2023-03-11 RX ORDER — MORPHINE SULFATE 4 MG/ML
4 INJECTION INTRAVENOUS ONCE
Status: COMPLETED | OUTPATIENT
Start: 2023-03-11 | End: 2023-03-11

## 2023-03-11 RX ORDER — KETOROLAC TROMETHAMINE 30 MG/ML
15 INJECTION, SOLUTION INTRAMUSCULAR; INTRAVENOUS
Status: DISPENSED | OUTPATIENT
Start: 2023-03-11 | End: 2023-03-14

## 2023-03-11 RX ORDER — MORPHINE SULFATE 2 MG/ML
2 INJECTION, SOLUTION INTRAMUSCULAR; INTRAVENOUS ONCE
Status: ACTIVE | OUTPATIENT
Start: 2023-03-11 | End: 2023-03-12

## 2023-03-11 RX ADMIN — ALUMINUM HYDROXIDE, MAGNESIUM HYDROXIDE, AND SIMETHICONE 40 ML: 200; 200; 20 SUSPENSION ORAL at 16:45

## 2023-03-11 RX ADMIN — IOPAMIDOL 100 ML: 755 INJECTION, SOLUTION INTRAVENOUS at 23:19

## 2023-03-11 RX ADMIN — MORPHINE SULFATE 4 MG: 4 INJECTION INTRAVENOUS at 15:52

## 2023-03-11 RX ADMIN — ONDANSETRON 4 MG: 2 INJECTION INTRAMUSCULAR; INTRAVENOUS at 16:46

## 2023-03-11 RX ADMIN — SODIUM CHLORIDE 1000 ML: 9 INJECTION, SOLUTION INTRAVENOUS at 15:52

## 2023-03-11 RX ADMIN — CEFTRIAXONE 1 G: 1 INJECTION, POWDER, FOR SOLUTION INTRAMUSCULAR; INTRAVENOUS at 16:53

## 2023-03-11 NOTE — PROGRESS NOTES
1847 Patient just arrived from [facility] ED, urology currently at the bedside assessing. Will obtain vitals when physician is finished and handoff report to nightshift RN.   Abby Jaeger MD notified of patient arrival.

## 2023-03-11 NOTE — ED TRIAGE NOTES
Pt c/o lower back pain x1 day, pt states she believes it is a kidney stone, has hx of stones, pt endorses pain with urination, denies n/v

## 2023-03-11 NOTE — ED PROVIDER NOTES
HPI   Patient is a 65 y.o. F who presents today with complaints of right low back/flank pain. Pain started last night. Has a history of stones, pain feels same as stones in the past.  Few days ago, had some dysuria. Denies nausea, vomiting, hematuria. Denies fever, abdominal pain. Has not taken anything for pain. No recent falls or injury, does work out regularly. ALLERGIES: Patient has no known allergies. Past Medical History:   Diagnosis Date    Anemia     Hypertension     Menorrhagia      Past Surgical History:   Procedure Laterality Date    HX  SECTION      HX GASTRIC BYPASS  2005    HX NEPHRECTOMY         Review of Systems   Constitutional:  Negative for fatigue and fever. HENT:  Negative for congestion. Respiratory:  Negative for cough, shortness of breath and wheezing. Cardiovascular:  Negative for chest pain. Gastrointestinal:  Negative for abdominal pain, constipation, diarrhea, nausea and vomiting. Genitourinary:  Positive for dysuria and flank pain. Musculoskeletal:  Positive for back pain. Negative for arthralgias and myalgias. Skin:  Negative for wound. Neurological:  Negative for dizziness, seizures, light-headedness and numbness. Psychiatric/Behavioral:  Negative for confusion. Vitals:    23 1403   BP: 123/77   Pulse: 79   Resp: 18   Temp: 98.7 °F (37.1 °C)   SpO2: 99%   Weight: 81.2 kg (179 lb)   Height: 5' 10\" (1.778 m)            Physical Exam  Vitals and nursing note reviewed. Constitutional:       General: She is not in acute distress. Appearance: Normal appearance. She is not ill-appearing, toxic-appearing or diaphoretic. HENT:      Head: Normocephalic and atraumatic. Nose: Nose normal.      Mouth/Throat:      Mouth: Mucous membranes are moist.   Cardiovascular:      Rate and Rhythm: Normal rate and regular rhythm. Pulmonary:      Effort: Pulmonary effort is normal. No respiratory distress. Breath sounds: Normal breath sounds.  No stridor. No wheezing, rhonchi or rales. Chest:      Chest wall: No tenderness. Abdominal:      General: Abdomen is flat. Bowel sounds are normal.      Palpations: Abdomen is soft. Tenderness: There is no abdominal tenderness. There is right CVA tenderness. There is no left CVA tenderness. Musculoskeletal:         General: Normal range of motion. Cervical back: Normal range of motion. Skin:     General: Skin is warm and dry. Neurological:      Mental Status: She is alert and oriented to person, place, and time. Mental status is at baseline. Psychiatric:         Mood and Affect: Mood normal.         Behavior: Behavior normal.         Thought Content:  Thought content normal.            LABORATORY RESULTS:  Recent Results (from the past 24 hour(s))   URINALYSIS W/ REFLEX CULTURE    Collection Time: 03/11/23  2:06 PM    Specimen: Urine   Result Value Ref Range    Color YELLOW/STRAW      Appearance CLEAR CLEAR      Specific gravity 1.006 1.003 - 1.030      pH (UA) 6.5 5.0 - 8.0      Protein Negative NEG mg/dL    Glucose Negative NEG mg/dL    Ketone Negative NEG mg/dL    Bilirubin Negative NEG      Blood SMALL (A) NEG      Urobilinogen 0.2 0.2 - 1.0 EU/dL    Nitrites Negative NEG      Leukocyte Esterase SMALL (A) NEG      WBC 5-10 0 - 4 /hpf    RBC 0-5 /hpf    Epithelial cells FEW FEW /lpf    Bacteria 1+ (A) NEG /hpf    UA:UC IF INDICATED CULTURE NOT INDICATED BY UA RESULT     CBC WITH AUTOMATED DIFF    Collection Time: 03/11/23  3:14 PM   Result Value Ref Range    WBC 7.4 3.6 - 11.0 K/uL    RBC 4.26 3.80 - 5.20 M/uL    HGB 12.2 11.5 - 16.0 g/dL    HCT 38.3 35.0 - 47.0 %    MCV 89.9 80.0 - 99.0 FL    MCH 28.6 26.0 - 34.0 PG    MCHC 31.9 30.0 - 36.5 g/dL    RDW 13.6 11.5 - 14.5 %    PLATELET 745 827 - 207 K/uL    MPV 11.2 8.9 - 12.9 FL    NRBC 0.0 0  WBC    ABSOLUTE NRBC 0.00 0.00 - 0.01 K/uL    NEUTROPHILS 64 32 - 75 %    LYMPHOCYTES 24 12 - 49 %    MONOCYTES 11 5 - 13 %    EOSINOPHILS 1 (L) 7 %    BASOPHILS 0 0 - 1 %    IMMATURE GRANULOCYTES 0 0 - 0.5 %    ABS. NEUTROPHILS 4.7 1.8 - 8.0 K/UL    ABS. LYMPHOCYTES 1.7 0.8 - 3.5 K/UL    ABS. MONOCYTES 0.8 0.0 - 1.0 K/UL    ABS. EOSINOPHILS 0.1 0.0 - 0.4 K/UL    ABS. BASOPHILS 0.0 0 - 1 K/UL    ABS. IMM. GRANS. 0.0 0.00 - 0.04 K/UL    DF AUTOMATED     METABOLIC PANEL, COMPREHENSIVE    Collection Time: 03/11/23  3:14 PM   Result Value Ref Range    Sodium 138 136 - 145 mmol/L    Potassium 4.1 3.5 - 5.1 mmol/L    Chloride 97 (L) 98 - 107 mmol/L    CO2 30 (H) 22 - 29 mmol/L    Anion gap 11 5 - 15 mmol/L    Glucose 86 65 - 100 mg/dL    BUN 11 6 - 20 MG/DL    Creatinine 0.66 0.50 - 0.90 MG/DL    BUN/Creatinine ratio 17 12 - 20      eGFR >60 >60 ml/min/1.73m2    Calcium 9.4 8.6 - 10.0 MG/DL    Bilirubin, total 0.4 0.2 - 1.0 MG/DL    ALT (SGPT) 16 10 - 35 U/L    AST (SGOT) 31 10 - 35 U/L    Alk. phosphatase 172 (H) 35 - 104 U/L    Protein, total 8.7 (H) 6.4 - 8.3 g/dL    Albumin 4.0 3.5 - 5.2 g/dL    Globulin 4.7 (H) 2.0 - 4.0 g/dL    A-G Ratio 0.9 (L) 1.1 - 2.2         IMAGING RESULTS:  CT ABD PELV WO CONT    Result Date: 3/11/2023  1. Obstructing calculus in the right upper pole collecting system. Focal hydronephrosis. 2. Probable right upper pole renal abscess. MEDICATIONS GIVEN:  Medications   morphine injection 2 mg (has no administration in time range)   sodium chloride 0.9 % bolus infusion 1,000 mL (1,000 mL IntraVENous New Bag 3/11/23 1552)   morphine injection 4 mg (4 mg IntraVENous Given 3/11/23 1552)   cefTRIAXone (ROCEPHIN) 1 g in 0.9% sodium chloride 10 mL IV syringe (1 g IntraVENous Given 3/11/23 1653)   ondansetron (ZOFRAN) injection 4 mg (4 mg IntraVENous Given 3/11/23 1646)   mylanta/viscous lidocaine (GI COCKTAIL) (40 mL Oral Given 3/11/23 1645)            Medical Decision Making  Amount and/or Complexity of Data Reviewed  Labs: ordered. Radiology: ordered. Risk  Prescription drug management. Decision regarding hospitalization.       Perfect Serve Consult for Admission  4:57 PM    ED Room Number: C06/C06  Patient Name and age:  Beau Shane 65 y.o.  female  Working Diagnosis:   1. Nephrolithiasis    2. Renal abscess    3. Bacteriuria        COVID-19 Suspicion:  no  Sepsis present:  no  Reassessment needed: no  Code Status:  Full Code  Readmission: no  Isolation Requirements:  no  Recommended Level of Care:  med/surg  Department: [facility] ED - [phone]      Other: 80-year-old female with history of nephrolithiasis seen today for several days of right flank/low back pain. Work-up does reveal 6 mm obstructing stone in the right upper pole with renal abscess, hydronephrosis. Urinalysis does show evidence of infection. Consult to [place] [facility], recommends admission for evaluation. Patient has received IV fluids, Rocephin, morphine. No evidence of sepsis, normotensive, afebrile. Patient presents today with chief complaint of right-sided flank pain. On exam, I do note right CVA tendernss. Abdominal exam is non-peritoneal, no guarding or rigidity. Considered differentials including pyelonephritis, UA does show +bacteria, WBC, LE. Considered nephrolithiasis, small bowel obstruction, acute appendicitis, diverticulitis, cholecystitis, aortic dissection, pancreatitis, so CT abdomen and pelvis performed and shows 6 mm obstructing stone in the right upper pole with renal abscess, hydronephrosis. Blood work is reassuring, normal kidney function, normal liver enzymes. Patient transferred to [facility] for admission for nephrolithiasis, renal abscess, UTI. History, exam, medical decision making, and plan discussed with ED attending, Dr. Abhijeet Gipson. IMPRESSION:  1. Nephrolithiasis    2. Renal abscess    3. Bacteriuria        DISPOSITION:  Admit    Please note that this dictation was completed with [employer], the [employer] voice recognition software.   Quite often unanticipated grammatical, syntax, homophones, and other interpretive errors are inadvertently transcribed by the computer software. Please disregard these errors. Please excuse any errors that have escaped final proofreading.

## 2023-03-11 NOTE — ED NOTES
TRANSFER - OUT REPORT:    Verbal report given to Romelia Etienne RN  on Abhijit Light  being transferred to [address] [place] [address] for routine progression of care       Report consisted of patients Situation, Background, Assessment and   Recommendations(SBAR). Information from the following report(s) SBAR was reviewed with the receiving nurse. Lines:   Peripheral IV 03/11/23 Left;Upper Antecubital (Active)   Site Assessment Clean, dry, & intact 03/11/23 1542   Phlebitis Assessment 0 03/11/23 1542   Infiltration Assessment 0 03/11/23 1542   Dressing Status Clean, dry, & intact 03/11/23 1542   Hub Color/Line Status Blue 03/11/23 1542   Action Taken Blood drawn 03/11/23 1542        Opportunity for questions and clarification was provided.       Patient transported with:   Monitor

## 2023-03-12 PROBLEM — N13.2 HYDRONEPHROSIS WITH URINARY OBSTRUCTION DUE TO RENAL CALCULUS: Status: ACTIVE | Noted: 2023-03-12

## 2023-03-12 PROBLEM — N20.1 OBSTRUCTION OF LEFT URETEROPELVIC JUNCTION DUE TO STONE: Status: ACTIVE | Noted: 2023-03-12

## 2023-03-12 PROBLEM — N15.1 RENAL ABSCESS, RIGHT: Status: ACTIVE | Noted: 2023-03-11

## 2023-03-12 LAB
ANION GAP SERPL CALC-SCNC: 4 MMOL/L (ref 5–15)
BUN SERPL-MCNC: 9 MG/DL (ref 6–20)
BUN/CREAT SERPL: 14 (ref 12–20)
CALCIUM SERPL-MCNC: 9.1 MG/DL (ref 8.5–10.1)
CHLORIDE SERPL-SCNC: 102 MMOL/L (ref 97–108)
CO2 SERPL-SCNC: 31 MMOL/L (ref 21–32)
CREAT SERPL-MCNC: 0.64 MG/DL (ref 0.55–1.02)
ERYTHROCYTE [DISTWIDTH] IN BLOOD BY AUTOMATED COUNT: 13.8 % (ref 11.5–14.5)
GLUCOSE SERPL-MCNC: 87 MG/DL (ref 65–100)
HCT VFR BLD AUTO: 32.5 % (ref 35–47)
HGB BLD-MCNC: 10.4 G/DL (ref 11.5–16)
MCH RBC QN AUTO: 28.3 PG (ref 26–34)
MCHC RBC AUTO-ENTMCNC: 32 G/DL (ref 30–36.5)
MCV RBC AUTO: 88.6 FL (ref 80–99)
NRBC # BLD: 0 K/UL (ref 0–0.01)
NRBC BLD-RTO: 0 PER 100 WBC
PLATELET # BLD AUTO: 254 K/UL (ref 150–400)
PMV BLD AUTO: 10.4 FL (ref 8.9–12.9)
POTASSIUM SERPL-SCNC: 3.5 MMOL/L (ref 3.5–5.1)
RBC # BLD AUTO: 3.67 M/UL (ref 3.8–5.2)
SODIUM SERPL-SCNC: 137 MMOL/L (ref 136–145)
WBC # BLD AUTO: 5.6 K/UL (ref 3.6–11)

## 2023-03-12 PROCEDURE — 74011000258 HC RX REV CODE- 258: Performed by: INTERNAL MEDICINE

## 2023-03-12 PROCEDURE — 74011000250 HC RX REV CODE- 250: Performed by: HOSPITALIST

## 2023-03-12 PROCEDURE — 36415 COLL VENOUS BLD VENIPUNCTURE: CPT

## 2023-03-12 PROCEDURE — 85027 COMPLETE CBC AUTOMATED: CPT

## 2023-03-12 PROCEDURE — 74011250637 HC RX REV CODE- 250/637: Performed by: INTERNAL MEDICINE

## 2023-03-12 PROCEDURE — 65270000029 HC RM PRIVATE

## 2023-03-12 PROCEDURE — 80048 BASIC METABOLIC PNL TOTAL CA: CPT

## 2023-03-12 PROCEDURE — 74011250636 HC RX REV CODE- 250/636: Performed by: HOSPITALIST

## 2023-03-12 PROCEDURE — 74011250636 HC RX REV CODE- 250/636: Performed by: INTERNAL MEDICINE

## 2023-03-12 RX ORDER — ONDANSETRON 4 MG/1
4 TABLET, ORALLY DISINTEGRATING ORAL
Status: DISCONTINUED | OUTPATIENT
Start: 2023-03-12 | End: 2023-03-17 | Stop reason: HOSPADM

## 2023-03-12 RX ORDER — ONDANSETRON 2 MG/ML
4 INJECTION INTRAMUSCULAR; INTRAVENOUS
Status: DISCONTINUED | OUTPATIENT
Start: 2023-03-12 | End: 2023-03-17 | Stop reason: HOSPADM

## 2023-03-12 RX ORDER — POLYETHYLENE GLYCOL 3350 17 G/17G
17 POWDER, FOR SOLUTION ORAL DAILY PRN
Status: DISCONTINUED | OUTPATIENT
Start: 2023-03-12 | End: 2023-03-17 | Stop reason: HOSPADM

## 2023-03-12 RX ORDER — SODIUM CHLORIDE 9 MG/ML
125 INJECTION, SOLUTION INTRAVENOUS CONTINUOUS
Status: DISCONTINUED | OUTPATIENT
Start: 2023-03-12 | End: 2023-03-14

## 2023-03-12 RX ORDER — FENTANYL CITRATE 50 UG/ML
50 INJECTION, SOLUTION INTRAMUSCULAR; INTRAVENOUS
Status: DISCONTINUED | OUTPATIENT
Start: 2023-03-12 | End: 2023-03-14

## 2023-03-12 RX ORDER — SODIUM CHLORIDE 0.9 % (FLUSH) 0.9 %
5-40 SYRINGE (ML) INJECTION AS NEEDED
Status: DISCONTINUED | OUTPATIENT
Start: 2023-03-12 | End: 2023-03-17 | Stop reason: HOSPADM

## 2023-03-12 RX ORDER — VANCOMYCIN 2 GRAM/500 ML IN 0.9 % SODIUM CHLORIDE INTRAVENOUS
2000 ONCE
Status: COMPLETED | OUTPATIENT
Start: 2023-03-12 | End: 2023-03-12

## 2023-03-12 RX ORDER — ACETAMINOPHEN 650 MG/1
650 SUPPOSITORY RECTAL
Status: DISCONTINUED | OUTPATIENT
Start: 2023-03-12 | End: 2023-03-17 | Stop reason: HOSPADM

## 2023-03-12 RX ORDER — SODIUM CHLORIDE 0.9 % (FLUSH) 0.9 %
5-40 SYRINGE (ML) INJECTION EVERY 8 HOURS
Status: DISCONTINUED | OUTPATIENT
Start: 2023-03-12 | End: 2023-03-17 | Stop reason: HOSPADM

## 2023-03-12 RX ORDER — ACETAMINOPHEN 325 MG/1
650 TABLET ORAL
Status: DISCONTINUED | OUTPATIENT
Start: 2023-03-12 | End: 2023-03-17 | Stop reason: HOSPADM

## 2023-03-12 RX ORDER — TAMSULOSIN HYDROCHLORIDE 0.4 MG/1
0.4 CAPSULE ORAL DAILY
Status: DISCONTINUED | OUTPATIENT
Start: 2023-03-12 | End: 2023-03-17 | Stop reason: HOSPADM

## 2023-03-12 RX ADMIN — SODIUM CHLORIDE 75 ML/HR: 900 INJECTION, SOLUTION INTRAVENOUS at 06:46

## 2023-03-12 RX ADMIN — VANCOMYCIN HYDROCHLORIDE 2000 MG: 10 INJECTION, POWDER, LYOPHILIZED, FOR SOLUTION INTRAVENOUS at 10:04

## 2023-03-12 RX ADMIN — CEFEPIME 2 G: 2 INJECTION, POWDER, FOR SOLUTION INTRAVENOUS at 17:04

## 2023-03-12 RX ADMIN — SODIUM CHLORIDE, PRESERVATIVE FREE 10 ML: 5 INJECTION INTRAVENOUS at 17:04

## 2023-03-12 RX ADMIN — SODIUM CHLORIDE, PRESERVATIVE FREE 10 ML: 5 INJECTION INTRAVENOUS at 21:54

## 2023-03-12 RX ADMIN — TAMSULOSIN HYDROCHLORIDE 0.4 MG: 0.4 CAPSULE ORAL at 09:54

## 2023-03-12 RX ADMIN — VANCOMYCIN HYDROCHLORIDE 1000 MG: 1 INJECTION, POWDER, LYOPHILIZED, FOR SOLUTION INTRAVENOUS at 21:54

## 2023-03-12 RX ADMIN — CEFEPIME 2 G: 2 INJECTION, POWDER, FOR SOLUTION INTRAVENOUS at 09:55

## 2023-03-12 NOTE — PROGRESS NOTES
[facility]  [address] [facility], [address] [place] [address]  [phone]      Hospitalist  Progress Note      NAME:         Alyx Baxter   :        1965  MRM:        [id number]    Date of service: 3/12/2023      Subjective: Patient seen and examined by me. Patient admitted with abdominal  pain. She says she has a mild aching discomfort but no nausea or vomiting. No fever or chills. No nausea or vomiting. Objective:    Vital Signs:    Visit Vitals  BP (!) 102/54 (BP 1 Location: Right upper arm, BP Patient Position: Sitting)   Pulse 74   Temp 98.2 °F (36.8 °C)   Resp 16   Ht 5' 10\" (1.778 m)   Wt 81.2 kg (179 lb)   SpO2 98%   Breastfeeding No   BMI 25.68 kg/m²      No intake or output data in the 24 hours ending 23 6417     Current inpatient medications reviewed:  Current Facility-Administered Medications   Medication Dose Route Frequency    sodium chloride (NS) flush 5-40 mL  5-40 mL IntraVENous Q8H    sodium chloride (NS) flush 5-40 mL  5-40 mL IntraVENous PRN    acetaminophen (TYLENOL) tablet 650 mg  650 mg Oral Q6H PRN    Or    acetaminophen (TYLENOL) suppository 650 mg  650 mg Rectal Q6H PRN    polyethylene glycol (MIRALAX) packet 17 g  17 g Oral DAILY PRN    ondansetron (ZOFRAN ODT) tablet 4 mg  4 mg Oral Q8H PRN    Or    ondansetron (ZOFRAN) injection 4 mg  4 mg IntraVENous Q6H PRN    0.9% sodium chloride infusion  75 mL/hr IntraVENous CONTINUOUS    fentaNYL citrate (PF) injection 50 mcg  50 mcg IntraVENous Q4H PRN    cefepime (MAXIPIME) 2 g in 0.9% sodium chloride (MBP/ADV) 100 mL MBP  2 g IntraVENous Q12H    ketorolac (TORADOL) injection 15 mg  15 mg IntraVENous Q6H PRN       Physical Examination:    General:   Weak and ill looking but not in distress   Eyes:   pink conjunctivae, PERRLA with no discharge.   ENT:   no ottorrhea or rhinorrhea with dry mucous membranes  Neck: no masses, thyroid non-tender and trachea central.  Pulm:  clear breath sounds without crackles or wheezes  Card:  has regular and normal S1, S2 without thrills, bruits or peripheral edema  Abd:  Soft, non-tender, non-distended, normoactive bowel sounds   Musc:  No cyanosis, clubbing, atrophy or deformities. Skin:  No rashes, bruising or ulcers. Neuro: Awake and alert. Generally a non focal exam. Follows commands appropriately  Psych:  Has a good insight and is oriented x 3    Diagnostic testing:    Laboratory data reviewed and independently interpreted:    Recent Labs     03/12/23  0647 03/11/23  1514   WBC 5.6 7.4   HGB 10.4* 12.2   HCT 32.5* 38.3    236     Recent Labs     03/12/23  0647 03/11/23  1514    138   K 3.5 4.1    97*   CO2 31 30*   GLU 87 86   BUN 9 11   CREA 0.64 0.66   CA 9.1 9.4   ALB  --  4.0   ALT  --  16     No components found for: GLPOC    Cultures, Imaging studies reviewed and reports noted, Telemetry reviewed and independently interpreted by me - all reviewed in [facility]     Notes reviewed: Consults, CM, therapies     Assessment and Plan:    Renal abscess, right (3/11/2023) POA: likely due to the obstructing ureteric stone. CT scan abdomen showed a right upper pole hypodense mass with a hyperdense rim measures 23 x 27 x 30 mm, and likely represents a renal abscess or less likely calyceal rupture. UA unremarkable. Start IV Cefepime and IV Vancomycin. DC IV Ceftriaxone. Await a Urology review    Obstruction of left ureteropelvic junction due to stone / Hydronephrosis with urinary obstruction due to renal calculus (3/12/2023) POA: CT noted an obstructing calculus in the right upper pole collecting system measures 6 mm. There is focal hydronephrosis of several upper pole calyces. Continue to keep NPO. IV fluids. IV Fentanyl PRN, severe pain. Consult Urology. Start Flomax    Essential hypertension (3/29/2016) POA: BPs low normal now. Hold all meds.  Continue IV fluids and monitor    Weakness / Hx gastric bypass POA: check an iron profile, vitamin B12 levels    Care Plan discussed with: Patient, Nursing. Prophylaxis:  SCD's                Anticipated Disposition:  Home w/Family    PCP:      Denise King MD     I have personally examined and treated the patient at bedside during this period.  To assist coordination of care and communication with nursing and staff, this note may be preliminary early in the day, but finalized by end of the day.         ___________________________________________________    Attending Physician:   Evan Price MD

## 2023-03-12 NOTE — PROGRESS NOTES
Pharmacy Dosing Services:     Cefepime dose increased per Protocol to 2 gm IV Q8h  - CrCl > 60 ml/min  - Indication: renal abscess    Thank you,  Sukumar Reyes, PharmD

## 2023-03-12 NOTE — PROGRESS NOTES
Bedside and Verbal shift change report given to LOKESH Vela (oncoming nurse) by Carolina Thomason (offgoing nurse). Report included the following information SBAR and Kardex.

## 2023-03-12 NOTE — PROGRESS NOTES
Progress Note    Patient: Alyx Baxter MRN: [id number]  SSN: [id number]    YOB: 1965  Age: 65 y.o. Sex: female          ADMITTED:  3/11/2023 to Jocelynn Wilcox MD  for Renal abscess [N15.1]           Alyx Baxter was admitted for Renal abscess [N15.1]. R non obs stone and 2 cm renal abscess which is separate from the stone    Vitals:  Temp (24hrs), Av.2 °F (36.8 °C), Min:98 °F (36.7 °C), Max:98.7 °F (37.1 °C)     Blood pressure (!) 92/46, pulse 65, temperature 98.1 °F (36.7 °C), resp. rate 16, height 5' 10\" (1.778 m), weight 81.2 kg (179 lb), SpO2 97 %, not currently breastfeeding. I&O's:  03/10 1901 -  0700  In: 16.3 [I.V.:16.3]  Out: -     0701 -  1900  In: 1132.1 [I.V.:1132.1]  Out: -      Exam:   NAD. Labs:   Recent Labs     23  0647 23  1514   WBC 5.6 7.4   HGB 10.4* 12.2   HCT 32.5* 38.3    236     Recent Labs     23  0647 23  1514    138   K 3.5 4.1    97*   CO2 31 30*   GLU 87 86   BUN 9 11   CREA 0.64 0.66   CA 9.1 9.4        Cultures:   mixed     Imaging:       Assessment:     - Principal Problem:    Renal abscess, right (3/11/2023)    Active Problems:    Essential hypertension (3/29/2016)      Nephrolithiasis (3/11/2023)      Obstruction of left ureteropelvic junction due to stone (3/12/2023)      Hydronephrosis with urinary obstruction due to renal calculus (3/12/2023)        Plan:     - abscess; discussed with interventional radiology. Too small to drain. Recommend ivabx and repeat cy in 2 days (Tuesday am), hopefully will improve.   - stone , no urgent rx at this time    Signed By: Tu Hernandez MD - 2023

## 2023-03-12 NOTE — PROGRESS NOTES
Problem: Pain  Goal: *Control of Pain  3/12/2023 0136 by Vannesa Diane RN  Outcome: Progressing Towards Goal  3/11/2023 2207 by Vannesa Diane RN  Outcome: Progressing Towards Goal  Goal: *PALLIATIVE CARE:  Alleviation of Pain  3/12/2023 0136 by Vannesa Diane RN  Outcome: Progressing Towards Goal  3/11/2023 2207 by Vannesa Diane RN  Outcome: Progressing Towards Goal     Problem: Patient Education: Go to Patient Education Activity  Goal: Patient/Family Education  3/12/2023 0136 by Vannesa Diane RN  Outcome: Progressing Towards Goal  3/11/2023 2207 by Vannesa Diane RN  Outcome: Progressing Towards Goal

## 2023-03-12 NOTE — PROGRESS NOTES
Reason for Admission:  Renal abscess                    RUR Score:  4% LOW                   Plan for utilizing home health: Pt is currently open with [facility] services- will need orders at d/c if SNF is not recommended. PCP: First and Last name:  Ninfa Jc MD   Name of Practice:    Are you a current patient: Yes/No: Yes   Approximate date of last visit: 3 months ago   Can you participate in a virtual visit with your PCP: Yes                    Current Advanced Directive/Advance Care Plan: Full Code- no AMD on file. Pt is interested in arranging them- CM notified [facility] to assist.     Healthcare Decision Maker:  Updated to reflect information obtained                   Transition of Care Plan:                     Pt is a 73 year old,  [place] female, admitted with renal abscess. Pt was AOX4 when speaking with CM- confirming address, emergency contact and PCP. Pt states she lives with her dtr Kayla Jacques- is independent with ADLs, works and drives. Pt has no DME and has not had HH or been to any SNF/IPR in the past.     Pt confirmed insurance coverage of [employer]- states no problems affording or accessing medications. Pt states her daughter will drive her home at time of discharge and as needed. Pt remains on Ortho- pending medical stability. Pt did state she will need a Work Note at time of discharge- CM will notify attending and fellow CM who will continue to follow and assist as needed. Care Management Interventions  PCP Verified by CM:  Yes  Mode of Transport at Discharge: Self  Transition of Care Consult (CM Consult): Discharge Planning  MyChart Signup: No  Discharge Durable Medical Equipment: No  Health Maintenance Reviewed: Yes  Physical Therapy Consult: Yes  Occupational Therapy Consult: Yes  Speech Therapy Consult: No  Support Systems: Other Family Member(s), Child(jeny)  Confirm Follow Up Transport: Self  The Patient and/or Patient Representative was Provided with a Choice of Provider and Agrees with the Discharge Plan?: Yes  Freedom of Choice List was Provided with Basic Dialogue that Supports the Patient's Individualized Plan of Care/Goals, Treatment Preferences and Shares the Quality Data Associated with the Providers?: Yes  Discharge Location  Patient Expects to be Discharged to[de-identified] Home with outpatient services    SUAD Villarreal, [address]

## 2023-03-12 NOTE — H&P
[address]  [phone]    Hospitalist Admission Note      NAME:  Mariel Kinney   :   1965   MRN:  [id number]     PCP:  Jos Mckeon MD     Date/Time of service:  3/11/2023 7:02 PM          Subjective:     CHIEF COMPLAINT: Flank pain    HISTORY OF PRESENT ILLNESS:     Ms. Ryan Alonso is a 65 y.o.  female who presented to the Emergency Department complaining of right flank pain. As per the patient it started last night but it got worse this morning. It was in the right flank and it was a sharp in nature. It was 10 out of 10 in intensity. She has a history of kidney stones in the past.  Patient was complaining of dysuria as well. She denies any nausea vomiting diarrhea constipation. No fever or chills. She denies any hematuria. Patient had a CT scan done which showed 6 mm obstructing stone in the right upper pole with renal abscess and hydronephrosis. [place] [facility] was consulted. They will see the patient in consultation. She denies any chest pain or shortness of breath. Past Medical History:   Diagnosis Date    Anemia     Hypertension     Menorrhagia         Past Surgical History:   Procedure Laterality Date    HX  SECTION      HX GASTRIC BYPASS      HX NEPHRECTOMY         Social History     Tobacco Use    Smoking status: Never    Smokeless tobacco: Never   Substance Use Topics    Alcohol use: No        Family History   Problem Relation Age of Onset    Hypertension Mother     Diabetes Mother     Cancer Mother         cervical    Heart Disease Mother     Prostate Cancer Father     Hypertension Father     Breast Cancer Paternal Aunt     Uterine Cancer Sister     Diabetes Sister           No Known Allergies     Prior to Admission medications    Medication Sig Start Date End Date Taking?  Authorizing Provider   triamterene-hydroCHLOROthiazide (MAXZIDE) 75-50 mg per tablet Take 1 Tablet by mouth daily. 9/30/22   Macy Holloway MD   eflornithine (VANIQA) 13.9 % topical cream Apply  to affected area two (2) times a day. apply thin layer to affected areas space application by 8 hour 3/82/65   Symone Gupta MD   cyanocobalamin (Vitamin B-12) 1,000 mcg/mL injection 1 mL by IntraMUSCular route every thirty (30) days.  3/24/22   Macy Holloway MD   clobetasoL (TEMOVATE) 0.05 % external solution Apply to scalp once daily 3/21/22   Macy Holloway MD       Review of Systems:  (bold if positive, if negative)    Gen:  Eyes:  ENT:  CVS:  Pulm:  GI:  : Flank pain positive, dysuria positive MS:  Skin:  Psych:  Endo:  Hem:  Renal:  Neuro:        Objective:      VITALS:    Vital signs reviewed; most recent are:    Visit Vitals  /73 (BP 1 Location: Left upper arm, BP Patient Position: At rest)   Pulse 70   Temp 98 °F (36.7 °C)   Resp 16   Ht 5' 10\" (1.778 m)   Wt 81.2 kg (179 lb)   SpO2 98%   BMI 25.68 kg/m²     SpO2 Readings from Last 6 Encounters:   03/11/23 98%   03/25/22 98%   06/04/20 97%   03/02/20 97%   10/07/19 99%   08/22/19 97%        No intake or output data in the 24 hours ending 03/11/23 2040     Exam:     Physical Exam:    Gen:  Well-developed, well-nourished, in no acute distress  HEENT:  Pink conjunctivae, PERRL, hearing intact to voice, moist mucous membranes  Neck:  Supple, without masses, thyroid non-tender  Resp:  No accessory muscle use, clear breath sounds without wheezes rales or rhonchi  Card:  No murmurs, normal S1, S2 without thrills, bruits or peripheral edema  Abd:  Soft, non-tender, non-distended, normoactive bowel sounds are present, no mass, right CVA tenderness positive  Lymph:  No cervical or inguinal adenopathy  Musc:  No cyanosis or clubbing  Skin:  No rashes or ulcers, skin turgor is good  Neuro:  Cranial nerves are grossly intact, no focal motor weakness, follows commands appropriately  Psych:  Good insight, oriented to person, place and time, alert     Labs:    Recent Labs 03/11/23  1514   WBC 7.4   HGB 12.2   HCT 38.3        Recent Labs     03/11/23  1514      K 4.1   CL 97*   CO2 30*   GLU 86   BUN 11   CREA 0.66   CA 9.4   ALB 4.0   TBILI 0.4   ALT 16     No results found for: GLUCPOC  No results for input(s): PH, PCO2, PO2, HCO3, FIO2 in the last 72 hours. No results for input(s): INR, INREXT, INREXT in the last 72 hours. All Micro Results       None            I have reviewed previous records       Assessment and Plan:      Principal Problem:    Pyelonephritis (3/11/2023)    Active Problems:    Essential hypertension (3/29/2016)      Renal abscess (3/11/2023)      Nephrolithiasis (3/11/2023)         Plan:    1. Pyelonephritis-with possible renal abscess. Start IV antibiotics, pain control. 2.  Nephrolithiasis and hydronephrosis-there is an obstructing stone in the right upper pole collecting system. Urology consult. N.p.o. after midnight. 3.  Hypertension -she takes Maxide at home. Monitor blood pressure closely. 4.  DVT prophylaxis-SCDs. Patient is a full code. Telemetry reviewed:   normal sinus rhythm    Risk of deterioration: medium      Total time spent with patient: 79 Minutes I personally reviewed chart, notes, data and current medications in the medical record. I have personally examined and treated the patient at bedside during this period. To assist coordination of care and communication with nursing and staff, this note may be preliminary early in the day, but finalized by end of the day.                  Care Plan discussed with: Patient    Discussed:  Code Status and Care Plan       ___________________________________________________    Attending Physician: Maximo Wilson MD

## 2023-03-12 NOTE — PROGRESS NOTES
[facility] Dosing Services: Antimicrobial Stewardship Daily Doc    Consult for antibiotic dosing of Vancomycin by Dr. Shai Barragan  Indication:  Pyelonephritis-with possible renal abscess  Day of Therapy: 1    Ht Readings from Last 1 Encounters:   03/11/23 177.8 cm (70\")        Wt Readings from Last 1 Encounters:   03/11/23 81.2 kg (179 lb)        Vancomycin therapy:  Loading dose: 2000mg IV x 1  Maintenance dose:  1000mg IV every 12 hours  Last level: N/A - new start   Dose calculated to approximate a           a. Target AUC/ALYSHA of 400-600          b. Trough of N/A      Assess/Plan: No leukocytosis; No procal; Renal appears stable; Afeb; Positive UA; Plan for Vancomycin random level within 24-36 hours of first maint dose. Dose administration notes:       Non-Kinetic Antimicrobial Dosing Regimen:   Current Regimen:  Cefepime 2g IV every 8 hours  Recommendation: Continue  Dose administration notes: Other Antimicrobial   (not dosed by pharmacist) None   Cultures None   Serum Creatinine Lab Results   Component Value Date/Time    Creatinine 0.64 03/12/2023 06:47 AM         Creatinine Clearance Estimated Creatinine Clearance: 97 mL/min (by C-G formula based on SCr of 0.64 mg/dL). Temp Temp: 98.2 °F (36.8 °C)       WBC Lab Results   Component Value Date/Time    WBC 5.6 03/12/2023 06:47 AM        Procalcitonin No results found for: PCT     For Antifungals, Metronidazole and Nafcillin: Lab Results   Component Value Date/Time    ALT (SGPT) 16 03/11/2023 03:14 PM    AST (SGOT) 31 03/11/2023 03:14 PM    Alk.  phosphatase 172 (H) 03/11/2023 03:14 PM    Bilirubin, total 0.4 03/11/2023 03:14 PM        Pharmacist Grisel Brian

## 2023-03-13 LAB
ANION GAP SERPL CALC-SCNC: 2 MMOL/L (ref 5–15)
BUN SERPL-MCNC: 12 MG/DL (ref 6–20)
BUN/CREAT SERPL: 20 (ref 12–20)
CALCIUM SERPL-MCNC: 8.7 MG/DL (ref 8.5–10.1)
CHLORIDE SERPL-SCNC: 107 MMOL/L (ref 97–108)
CO2 SERPL-SCNC: 29 MMOL/L (ref 21–32)
COMMENT, HOLDF: NORMAL
CREAT SERPL-MCNC: 0.59 MG/DL (ref 0.55–1.02)
FERRITIN SERPL-MCNC: 20 NG/ML (ref 26–388)
GLUCOSE SERPL-MCNC: 90 MG/DL (ref 65–100)
IRON SATN MFR SERPL: 9 % (ref 20–50)
IRON SERPL-MCNC: 29 UG/DL (ref 35–150)
POTASSIUM SERPL-SCNC: 3.5 MMOL/L (ref 3.5–5.1)
SAMPLES BEING HELD,HOLD: NORMAL
SODIUM SERPL-SCNC: 138 MMOL/L (ref 136–145)
TIBC SERPL-MCNC: 309 UG/DL (ref 250–450)
VIT B12 SERPL-MCNC: 142 PG/ML (ref 193–986)

## 2023-03-13 PROCEDURE — 74011000258 HC RX REV CODE- 258: Performed by: INTERNAL MEDICINE

## 2023-03-13 PROCEDURE — 82728 ASSAY OF FERRITIN: CPT

## 2023-03-13 PROCEDURE — 80048 BASIC METABOLIC PNL TOTAL CA: CPT

## 2023-03-13 PROCEDURE — 82607 VITAMIN B-12: CPT

## 2023-03-13 PROCEDURE — 74011250636 HC RX REV CODE- 250/636: Performed by: INTERNAL MEDICINE

## 2023-03-13 PROCEDURE — 74011250637 HC RX REV CODE- 250/637: Performed by: INTERNAL MEDICINE

## 2023-03-13 PROCEDURE — 65270000029 HC RM PRIVATE

## 2023-03-13 PROCEDURE — 74011000250 HC RX REV CODE- 250: Performed by: HOSPITALIST

## 2023-03-13 PROCEDURE — 83540 ASSAY OF IRON: CPT

## 2023-03-13 PROCEDURE — 36415 COLL VENOUS BLD VENIPUNCTURE: CPT

## 2023-03-13 PROCEDURE — 87086 URINE CULTURE/COLONY COUNT: CPT

## 2023-03-13 RX ORDER — HYDROCORTISONE 1 %
CREAM (GRAM) TOPICAL AS NEEDED
Status: DISCONTINUED | OUTPATIENT
Start: 2023-03-13 | End: 2023-03-17 | Stop reason: HOSPADM

## 2023-03-13 RX ORDER — DIPHENHYDRAMINE HCL 25 MG
25 CAPSULE ORAL
Status: DISCONTINUED | OUTPATIENT
Start: 2023-03-13 | End: 2023-03-17 | Stop reason: HOSPADM

## 2023-03-13 RX ORDER — LEVOFLOXACIN 5 MG/ML
500 INJECTION, SOLUTION INTRAVENOUS EVERY 24 HOURS
Status: DISCONTINUED | OUTPATIENT
Start: 2023-03-13 | End: 2023-03-17

## 2023-03-13 RX ADMIN — CEFEPIME 2 G: 2 INJECTION, POWDER, FOR SOLUTION INTRAVENOUS at 10:20

## 2023-03-13 RX ADMIN — SODIUM CHLORIDE 125 ML/HR: 900 INJECTION, SOLUTION INTRAVENOUS at 01:18

## 2023-03-13 RX ADMIN — SODIUM CHLORIDE, PRESERVATIVE FREE 10 ML: 5 INJECTION INTRAVENOUS at 23:11

## 2023-03-13 RX ADMIN — SODIUM CHLORIDE 125 ML/HR: 900 INJECTION, SOLUTION INTRAVENOUS at 10:21

## 2023-03-13 RX ADMIN — CEFEPIME 2 G: 2 INJECTION, POWDER, FOR SOLUTION INTRAVENOUS at 01:16

## 2023-03-13 RX ADMIN — VANCOMYCIN HYDROCHLORIDE 1000 MG: 1 INJECTION, POWDER, LYOPHILIZED, FOR SOLUTION INTRAVENOUS at 09:10

## 2023-03-13 RX ADMIN — VANCOMYCIN HYDROCHLORIDE 1000 MG: 1 INJECTION, POWDER, LYOPHILIZED, FOR SOLUTION INTRAVENOUS at 23:11

## 2023-03-13 RX ADMIN — SODIUM CHLORIDE, PRESERVATIVE FREE 10 ML: 5 INJECTION INTRAVENOUS at 06:03

## 2023-03-13 RX ADMIN — DIPHENHYDRAMINE HYDROCHLORIDE 25 MG: 25 CAPSULE ORAL at 18:58

## 2023-03-13 NOTE — ACP (ADVANCE CARE PLANNING)
Advance Care Planning   Advance Care Planning Inpatient Note  [facility]    Today's Date: 3/13/2023  Unit: [facility] 4M POST SURG ORT 1    Received request from patient. Upon review of chart and communication with care team, patient's decision making abilities are not in question. Patient was/were present in the room during visit. Goals of ACP Conversation:  Discuss Advance Care planning documents  Facilitate a discussion related to patient's goals of care as they align with the patient's values and beliefs    Health Care Decision Makers:      Primary Decision MakerElester Vanessa Child - [phone]    Primary Decision Maker: Mariannewilliamcharles Whyte (primary contact) - Daughter - [phone]    Summary:  Documented Next of Kin, per patient report    Advance Care Planning Documents (Patient Wishes) on file:  None     Assessment:    Miss Alcides Cook indicated she knows a bit about AMDs. Provided an overview of the AMD form. She took the form and will review it at a later time. She understands her two daughters would be primary decision makers. She requested that her daughter Valentine Padilla be the first to be contacted if possible. She has very strong [place] bridger and trust in God. She comes from a [employer] but most important is the relationship she has with God. Provided listening presence as she shared her bridger. Provided prayer.       Interventions:  Provided education on documents for clarity and greater understanding  Discussed and provided education on state decision maker hierarchy  Encouraged ongoing ACP conversation with future decision makers and loved ones  Reviewed but did not complete ACP document    Care Preferences Communicated:  No    Outcomes/Plan:  ACP Discussion Completed    Elyse Hartman [facility] on 3/13/2023 at 11:49 AM

## 2023-03-13 NOTE — PROGRESS NOTES
Bedside shift change report given to Amberly Guaman RN (oncoming nurse) by Abdi Beltrán RN (offgoing nurse). Report included the following information SBAR, Kardex, Intake/Output, MAR, and Recent Results.

## 2023-03-13 NOTE — PROGRESS NOTES
Urology Progress Note    Patient: Caty Jacobsen MRN: [id number]  SSN: [id number]    YOB: 1965  Age: 65 y.o. Sex: female        Assessment:     Resting in bed on phone, NAD, well appearing   Without complaints   Afebrile, no leukocytosis  Cr 0.59    Plan:     2cm right renal abscess, right nonobstructing stone   -Case discussed with IR 3/12- abscess too small to drain. Continue broad spectrum abx. CT abd/pelv [facility] COGNITIVE DISORDERS tomorrow AM to reassess abscess, hopeful for improvement. No urgent intervention for stone at this time.     Reason For Visit:     Follow up for R nonobstructing stone and 2cm renal abcess (right)    ROS:     Denies fevers  Denies abdominal or flank pain   Denies hematuria, frequency, or dysuria     Objective:     Visit Vitals  BP (!) 92/52   Pulse 64   Temp 98.4 °F (36.9 °C)   Resp 16   Ht 5' 10\" (1.778 m)   Wt 81.2 kg (179 lb)   SpO2 99%   Breastfeeding No   BMI 25.68 kg/m²         Intake/Output Summary (Last 24 hours) at 3/13/2023 0932  Last data filed at 3/13/2023 0700  Gross per 24 hour   Intake 3544.08 ml   Output --   Net 3544.08 ml       Physical Exam  General: NAD, well appearing   Respiratory: no distress, room air  Abdomen: soft, no distention, nontender   : no CVA tenderness, voiding independently   Neuro: Appropriate, no focal neurological deficits    Labs reviewed, March 13, 2023  Recent Results (from the past 24 hour(s))   METABOLIC PANEL, BASIC    Collection Time: 03/13/23  4:07 AM   Result Value Ref Range    Sodium 138 136 - 145 mmol/L    Potassium 3.5 3.5 - 5.1 mmol/L    Chloride 107 97 - 108 mmol/L    CO2 29 21 - 32 mmol/L    Anion gap 2 (L) 5 - 15 mmol/L    Glucose 90 65 - 100 mg/dL    BUN 12 6 - 20 MG/DL    Creatinine 0.59 0.55 - 1.02 MG/DL    BUN/Creatinine ratio 20 12 - 20      eGFR >60 >60 ml/min/1.73m2    Calcium 8.7 8.5 - 10.1 MG/DL   FERRITIN    Collection Time: 03/13/23  4:07 AM   Result Value Ref Range    Ferritin 20 (L) 26 - 388 NG/ML   IRON PROFILE    Collection Time: 03/13/23  4:07 AM   Result Value Ref Range    Iron 29 (L) 35 - 150 ug/dL    TIBC 309 250 - 450 ug/dL    Iron % saturation 9 (L) 20 - 50 %       Imaging:  CT Results  (Last 48 hours)                 03/11/23 8419  CT ABD PELV W CONT Final result    Impression:  Redemonstration of upper pole right renal abscess with   nonobstructing right ureteropelvic junction stone. Narrative:  EXAM: CT ABD PELV W CONT       INDICATION: stone, ? abscess R kidney       COMPARISON: CT 3/11/2023. CONTRAST: 100 mL of Isovue-370. TECHNIQUE:    Following the uneventful intravenous administration of contrast, thin axial   images were obtained through the abdomen and pelvis. Coronal and sagittal   reconstructions were generated. Oral contrast was not administered. CT dose   reduction was achieved through use of a standardized protocol tailored for this   examination and automatic exposure control for dose modulation. FINDINGS:    LOWER THORAX: No significant abnormality in the incidentally imaged lower chest.   LIVER: No mass. BILIARY TREE: Gallbladder is surgically absent. CBD is not dilated. SPLEEN: Unremarkable. PANCREAS: No mass or ductal dilatation. ADRENALS: Unremarkable. KIDNEYS: There is redemonstration of an anterolateral upper pole abscess of the   right kidney with hypoenhancement the adjacent renal parenchyma. There is   redemonstration of a 6 mm calculus at the right ureteropelvic junction with no   right hydronephrosis. Couple punctate calcifications are redemonstrated the   lower pole collecting system of left kidney. No left hydronephrosis. Ureters are   normal in course and caliber without calculi. STOMACH: Postsurgical changes of gastric bypass. Otherwise unremarkable. SMALL BOWEL: No dilatation or wall thickening. COLON: No dilatation or wall thickening. APPENDIX: Unremarkable. PERITONEUM: No ascites or pneumoperitoneum. RETROPERITONEUM: No lymphadenopathy or aortic aneurysm. REPRODUCTIVE ORGANS: Leiomyomatous uterus. Ovaries are unremarkable. URINARY BLADDER: No mass or calculus. BONES: Degenerative spine change. No acute fracture or aggressive lesion. ABDOMINAL WALL: No mass or hernia. ADDITIONAL COMMENTS: N/A           03/11/23 1442  CT ABD PELV WO CONT Final result    Impression:  1. Obstructing calculus in the right upper pole collecting system. Focal   hydronephrosis. 2. Probable right upper pole renal abscess. Narrative:  CT ABDOMEN AND PELVIS WITHOUT CONTRAST. 3/11/2023 2:42 PM        INDICATION: Flank pain, urinary tract infection. COMPARISON: None. TECHNIQUE: CT of the abdomen and pelvis was performed without contrast. CT dose   reduction was achieved through use of a standardized protocol tailored for this   examination and automatic exposure control for dose modulation. FINDINGS:   Abdomen: An obstructing calculus in the right upper pole collecting system   measures 6 mm. There is focal hydronephrosis of several upper pole calyces. An   adjacent hypodense mass with a hyperdense rim measures 23 x 27 x 30 mm, and   likely represents a renal abscess or less likely calyceal rupture. Additional,   small, left renal calculi are nonobstructing. The lung bases are clear. The heart size is normal. Post Jean Paul-en-Y gastric   bypass and cholecystectomy. Incidental note is made of an hepatic cyst. The   unenhanced distal esophagus, gastrojejunostomy, excluded stomach, biliary limb,   liver, pancreas, spleen, adrenals, and kidneys are otherwise normal.       Pelvis: Steatorrhea is a nonspecific finding of malabsorption. It is expected in   Jean Paul-en-Y gastric bypass. The unenhanced small bowel, Jean Paul limb,   jejunojejunostomy, ileocecal junction, appendix, and bladder are normal. No free   air or fluid, and no abdominopelvic lymphadenopathy.                    Signed By: Ember Tellez NP - March 13, 2023

## 2023-03-13 NOTE — PROGRESS NOTES
Spiritual Care Assessment/Progress Note  [address]      NAME: Fab Stringer      MRN: [id number]  AGE: 65 y.o.  SEX: female  Holiness Affiliation: Anglican   Language: English     3/13/2023     Total Time (in minutes): 41     Spiritual Assessment begun in [facility] 4M POST SURG ORT 1 through conversation with:         [x]Patient        [] Family    [] Friend(s)        Reason for Consult: Request by staff     Spiritual beliefs: (Please include comment if needed)     [x] Identifies with a bridger tradition:    Sobeida Roblero      [] Supported by a bridger community:            [] Claims no spiritual orientation:           [] Seeking spiritual identity:                [] Adheres to an individual form of spirituality:           [] Not able to assess:                           Identified resources for coping:      [x] Prayer                               [] Music                  [] Guided Imagery     [] Family/friends                 [] Pet visits     [] Devotional reading                         [] Unknown     [] Other:                                             Interventions offered during this visit: (See comments for more details)    Patient Interventions: Advance medical directive consult, Affirmation of emotions/emotional suffering, Affirmation of bridger, Catharsis/review of pertinent events in supportive environment, Iconic (affirming the presence of God/Higher Power), Initial/Spiritual assessment, patient floor, Prayer (actual), Prayer (assurance of)           Plan of Care:     [] Support spiritual and/or cultural needs    [] Support AMD and/or advance care planning process      [] Support grieving process   [] Coordinate Rites and/or Rituals    [] Coordination with community clergy   [] No spiritual needs identified at this time   [] Detailed Plan of Care below (See Comments)  [] Make referral to Music Therapy  [] Make referral to Pet Therapy     [] Make referral to Addiction services  [] Make referral to [facility]  [] Make referral to Spiritual Care Partner  [] No future visits requested        [] Contact Spiritual Care for further referrals     Comments: Responded to In Basket request for a  visit on 4 Post Surg. Miss Haritha Devries indicated she knows a bit about AMDs. Provided an overview of the AMD form. She took the form and will review it at a later time. She understands her two daughters would be primary decision makers. She requested that her daughter Amos Brody be the first to be contacted if possible. She has very strong Julianna Lopez bridger and trust in God. She comes from a [employer] but most important is the relationship she has with God. Provided listening presence as she shared her bridger. Provided prayer.   Jamaica Tang., MS., [facility]  Page a  [phone]- Greta Cazares ([phone])

## 2023-03-13 NOTE — PROGRESS NOTES
3/13/2023  12:21 PM  Care Management Progress Note      ICD-10-CM ICD-9-CM    1. Nephrolithiasis  N20.0 592.0       2. Renal abscess  N15.1 590.2       3. Bacteriuria  R82.71 791.9           RUR:  4%  Risk Level: [x]Low []Moderate []High  Value-based purchasing: [x] Yes [] No  Bundle patient: [] Yes [x] No   Specify:     Transition of care plan:  Awaiting medical clearance and Dc order. Urology following. CT tomorrow. Home with family assistance. Outpatient follow-up. Pt's family to transport.

## 2023-03-13 NOTE — PROGRESS NOTES
[facility]  [address] [facility], [address] [place] [address]  [phone]      Hospitalist  Progress Note      NAME:         Rhonda Manriquez   :        1965  MRM:        [id number]    Date of service: 3/13/2023      Subjective: Patient seen and examined by me. Patient admitted with abdominal  pain. No pain today. No fever or chills. No nausea or vomiting.  Anxious about going home soon    Objective:    Vital Signs:    Visit Vitals  BP (!) 101/55   Pulse 63   Temp 98.1 °F (36.7 °C)   Resp 16   Ht 5' 10\" (1.778 m)   Wt 81.2 kg (179 lb)   SpO2 97%   Breastfeeding No   BMI 25.68 kg/m²        Intake/Output Summary (Last 24 hours) at 3/13/2023 1529  Last data filed at 3/13/2023 0700  Gross per 24 hour   Intake 2172 ml   Output --   Net 2172 ml          Current inpatient medications reviewed:  Current Facility-Administered Medications   Medication Dose Route Frequency    [START ON 3/14/2023] Vancomycin RANDOM level @0400 on 3/14/2023   Other ONCE    sodium chloride (NS) flush 5-40 mL  5-40 mL IntraVENous Q8H    sodium chloride (NS) flush 5-40 mL  5-40 mL IntraVENous PRN    acetaminophen (TYLENOL) tablet 650 mg  650 mg Oral Q6H PRN    Or    acetaminophen (TYLENOL) suppository 650 mg  650 mg Rectal Q6H PRN    polyethylene glycol (MIRALAX) packet 17 g  17 g Oral DAILY PRN    ondansetron (ZOFRAN ODT) tablet 4 mg  4 mg Oral Q8H PRN    Or    ondansetron (ZOFRAN) injection 4 mg  4 mg IntraVENous Q6H PRN    0.9% sodium chloride infusion  125 mL/hr IntraVENous CONTINUOUS    fentaNYL citrate (PF) injection 50 mcg  50 mcg IntraVENous Q4H PRN    cefepime (MAXIPIME) 2 g in 0.9% sodium chloride (MBP/ADV) 100 mL MBP  2 g IntraVENous Q8H    tamsulosin (FLOMAX) capsule 0.4 mg  0.4 mg Oral DAILY    vancomycin (VANCOCIN) 1,000 mg in 0.9% sodium chloride 250 mL (Iwea2Nom)  1,000 mg IntraVENous Q12H    ketorolac (TORADOL) injection 15 mg  15 mg IntraVENous Q6H PRN       Physical Examination:    General:   Weak and ill looking but not in distress   Eyes:   pink conjunctivae, PERRLA with no discharge. ENT:   no ottorrhea or rhinorrhea with dry mucous membranes  Neck: no masses, thyroid non-tender and trachea central.  Pulm:  clear breath sounds without crackles or wheezes  Card:  has regular and normal S1, S2 without thrills, bruits or peripheral edema  Abd:  Soft, non-tender, non-distended, normoactive bowel sounds   Musc:  No cyanosis, clubbing, atrophy or deformities. Skin:  No rashes, bruising or ulcers. Neuro: Awake and alert. Generally a non focal exam. Follows commands appropriately  Psych:  Has a good insight and is oriented x 3    Diagnostic testing:    Laboratory data reviewed and independently interpreted:    Recent Labs     03/12/23  0647 03/11/23  1514   WBC 5.6 7.4   HGB 10.4* 12.2   HCT 32.5* 38.3    236       Recent Labs     03/13/23  0407 03/12/23  0647 03/11/23  1514    137 138   K 3.5 3.5 4.1    102 97*   CO2 29 31 30*   GLU 90 87 86   BUN 12 9 11   CREA 0.59 0.64 0.66   CA 8.7 9.1 9.4   ALB  --   --  4.0   ALT  --   --  16       No components found for: GLPOC    Cultures, Imaging studies reviewed and reports noted, Telemetry reviewed and independently interpreted by me - all reviewed in [facility]     Notes reviewed: Consults, CM, therapies     Assessment and Plan:    Renal abscess, right (3/11/2023) POA: likely due to the obstructing ureteric stone. CT scan abdomen showed a right upper pole hypodense mass with a hyperdense rim measures 23 x 27 x 30 mm, and likely represents a renal abscess or less likely calyceal rupture. UA unremarkable. IV Cefepime and IV Vancomycin.  CT repeat on Friday per Urology    Obstruction of left ureteropelvic junction due to stone / Hydronephrosis with urinary obstruction due to renal calculus (3/12/2023) POA: CT noted an obstructing calculus in the right upper pole collecting system measures 6 mm. There is focal hydronephrosis of several upper pole calyces. Continue to keep NPO. IV fluids. IV Fentanyl PRN, severe pain. Consult Urology. Start Flomax    Essential hypertension (3/29/2016) POA: BPs low normal now. Hold all meds. Continue IV fluids and monitor    Weakness / Hx gastric bypass POA: check an iron profile, vitamin B12 levels    Anemia: Ferritin quite low at 20, with sat 9%, and iron 29. TIBC 309, will start Iron supplementation    Care Plan discussed with: Patient, Nursing. Prophylaxis:  SCD's                Anticipated Disposition:  Home w/Family    PCP:      Yulissa Merlos MD     I have personally examined and treated the patient at bedside during this period.  To assist coordination of care and communication with nursing and staff, this note may be preliminary early in the day, but finalized by end of the day.         ___________________________________________________    Attending Physician:   Diallo Gutierrez MD

## 2023-03-13 NOTE — PROGRESS NOTES
Problem: Pain  Goal: *Control of Pain  Outcome: Progressing Towards Goal  Goal: *PALLIATIVE CARE:  Alleviation of Pain  Outcome: Progressing Towards Goal     Problem: Patient Education: Go to Patient Education Activity  Goal: Patient/Family Education  Outcome: Progressing Towards Goal     Problem: Falls - Risk of  Goal: *Absence of Falls  Description: Document Libby Nevarez Fall Risk and appropriate interventions in the flowsheet.   Outcome: Progressing Towards Goal  Note: Fall Risk Interventions:            Medication Interventions: Patient to call before getting OOB, Teach patient to arise slowly

## 2023-03-14 ENCOUNTER — APPOINTMENT (OUTPATIENT)
Dept: CT IMAGING | Age: 58
End: 2023-03-14
Attending: NURSE PRACTITIONER
Payer: COMMERCIAL

## 2023-03-14 LAB
ANION GAP SERPL CALC-SCNC: 2 MMOL/L (ref 5–15)
BACTERIA SPEC CULT: NORMAL
BASOPHILS # BLD: 0 K/UL (ref 0–0.1)
BASOPHILS NFR BLD: 0 % (ref 0–1)
BUN SERPL-MCNC: 11 MG/DL (ref 6–20)
BUN/CREAT SERPL: 18 (ref 12–20)
CALCIUM SERPL-MCNC: 8.9 MG/DL (ref 8.5–10.1)
CHLORIDE SERPL-SCNC: 111 MMOL/L (ref 97–108)
CO2 SERPL-SCNC: 28 MMOL/L (ref 21–32)
CREAT SERPL-MCNC: 0.6 MG/DL (ref 0.55–1.02)
DIFFERENTIAL METHOD BLD: NORMAL
EOSINOPHIL # BLD: 0.1 K/UL (ref 0–0.4)
EOSINOPHIL NFR BLD: 2 % (ref 0–7)
ERYTHROCYTE [DISTWIDTH] IN BLOOD BY AUTOMATED COUNT: 13.8 % (ref 11.5–14.5)
GLUCOSE SERPL-MCNC: 89 MG/DL (ref 65–100)
HCT VFR BLD AUTO: 37.3 % (ref 35–47)
HGB BLD-MCNC: 11.7 G/DL (ref 11.5–16)
IMM GRANULOCYTES # BLD AUTO: 0 K/UL (ref 0–0.04)
IMM GRANULOCYTES NFR BLD AUTO: 0 % (ref 0–0.5)
LYMPHOCYTES # BLD: 1 K/UL (ref 0.8–3.5)
LYMPHOCYTES NFR BLD: 18 % (ref 12–49)
MCH RBC QN AUTO: 28.5 PG (ref 26–34)
MCHC RBC AUTO-ENTMCNC: 31.4 G/DL (ref 30–36.5)
MCV RBC AUTO: 90.8 FL (ref 80–99)
MONOCYTES # BLD: 0.4 K/UL (ref 0–1)
MONOCYTES NFR BLD: 7 % (ref 5–13)
NEUTS SEG # BLD: 3.8 K/UL (ref 1.8–8)
NEUTS SEG NFR BLD: 73 % (ref 32–75)
NRBC # BLD: 0 K/UL (ref 0–0.01)
NRBC BLD-RTO: 0 PER 100 WBC
PLATELET # BLD AUTO: 280 K/UL (ref 150–400)
PMV BLD AUTO: 9.7 FL (ref 8.9–12.9)
POTASSIUM SERPL-SCNC: 3.6 MMOL/L (ref 3.5–5.1)
RBC # BLD AUTO: 4.11 M/UL (ref 3.8–5.2)
SERVICE CMNT-IMP: NORMAL
SODIUM SERPL-SCNC: 141 MMOL/L (ref 136–145)
VANCOMYCIN SERPL-MCNC: 20.4 UG/ML
WBC # BLD AUTO: 5.3 K/UL (ref 3.6–11)

## 2023-03-14 PROCEDURE — 85025 COMPLETE CBC W/AUTO DIFF WBC: CPT

## 2023-03-14 PROCEDURE — 74178 CT ABD&PLV WO CNTR FLWD CNTR: CPT

## 2023-03-14 PROCEDURE — 74011250637 HC RX REV CODE- 250/637: Performed by: HOSPITALIST

## 2023-03-14 PROCEDURE — 74011250637 HC RX REV CODE- 250/637: Performed by: INTERNAL MEDICINE

## 2023-03-14 PROCEDURE — 74011000250 HC RX REV CODE- 250: Performed by: HOSPITALIST

## 2023-03-14 PROCEDURE — 65270000029 HC RM PRIVATE

## 2023-03-14 PROCEDURE — 74011250636 HC RX REV CODE- 250/636

## 2023-03-14 PROCEDURE — 74011250636 HC RX REV CODE- 250/636: Performed by: UROLOGY

## 2023-03-14 PROCEDURE — 74011000636 HC RX REV CODE- 636: Performed by: RADIOLOGY

## 2023-03-14 PROCEDURE — 74011250636 HC RX REV CODE- 250/636: Performed by: INTERNAL MEDICINE

## 2023-03-14 PROCEDURE — 74011250636 HC RX REV CODE- 250/636: Performed by: NURSE PRACTITIONER

## 2023-03-14 PROCEDURE — 80048 BASIC METABOLIC PNL TOTAL CA: CPT

## 2023-03-14 PROCEDURE — 36415 COLL VENOUS BLD VENIPUNCTURE: CPT

## 2023-03-14 PROCEDURE — 74011250636 HC RX REV CODE- 250/636: Performed by: HOSPITALIST

## 2023-03-14 PROCEDURE — 80202 ASSAY OF VANCOMYCIN: CPT

## 2023-03-14 RX ORDER — HYDROMORPHONE HYDROCHLORIDE 1 MG/ML
0.5 INJECTION, SOLUTION INTRAMUSCULAR; INTRAVENOUS; SUBCUTANEOUS
Status: DISCONTINUED | OUTPATIENT
Start: 2023-03-14 | End: 2023-03-17 | Stop reason: HOSPADM

## 2023-03-14 RX ORDER — SODIUM CHLORIDE 9 MG/ML
75 INJECTION, SOLUTION INTRAVENOUS CONTINUOUS
Status: DISCONTINUED | OUTPATIENT
Start: 2023-03-14 | End: 2023-03-17 | Stop reason: HOSPADM

## 2023-03-14 RX ORDER — DIPHENHYDRAMINE HYDROCHLORIDE 50 MG/ML
25 INJECTION, SOLUTION INTRAMUSCULAR; INTRAVENOUS ONCE
Status: COMPLETED | OUTPATIENT
Start: 2023-03-14 | End: 2023-03-14

## 2023-03-14 RX ORDER — FERROUS GLUCONATE 324(38)MG
1 TABLET ORAL
Status: DISCONTINUED | OUTPATIENT
Start: 2023-03-14 | End: 2023-03-17 | Stop reason: HOSPADM

## 2023-03-14 RX ORDER — TAMSULOSIN HYDROCHLORIDE 0.4 MG/1
0.4 CAPSULE ORAL DAILY
Status: DISCONTINUED | OUTPATIENT
Start: 2023-03-14 | End: 2023-03-14

## 2023-03-14 RX ORDER — DIPHENHYDRAMINE HYDROCHLORIDE 50 MG/ML
12.5 INJECTION, SOLUTION INTRAMUSCULAR; INTRAVENOUS ONCE
Status: COMPLETED | OUTPATIENT
Start: 2023-03-14 | End: 2023-03-14

## 2023-03-14 RX ADMIN — VANCOMYCIN HYDROCHLORIDE 1000 MG: 1 INJECTION, POWDER, LYOPHILIZED, FOR SOLUTION INTRAVENOUS at 21:55

## 2023-03-14 RX ADMIN — DIPHENHYDRAMINE HYDROCHLORIDE 25 MG: 50 INJECTION, SOLUTION INTRAMUSCULAR; INTRAVENOUS at 09:06

## 2023-03-14 RX ADMIN — LEVOFLOXACIN 500 MG: 5 INJECTION, SOLUTION INTRAVENOUS at 04:36

## 2023-03-14 RX ADMIN — SODIUM CHLORIDE, PRESERVATIVE FREE 10 ML: 5 INJECTION INTRAVENOUS at 15:16

## 2023-03-14 RX ADMIN — VANCOMYCIN HYDROCHLORIDE 1000 MG: 1 INJECTION, POWDER, LYOPHILIZED, FOR SOLUTION INTRAVENOUS at 09:08

## 2023-03-14 RX ADMIN — DIPHENHYDRAMINE HYDROCHLORIDE 12.5 MG: 50 INJECTION, SOLUTION INTRAMUSCULAR; INTRAVENOUS at 01:20

## 2023-03-14 RX ADMIN — KETOROLAC TROMETHAMINE 15 MG: 30 INJECTION, SOLUTION INTRAMUSCULAR at 11:09

## 2023-03-14 RX ADMIN — SODIUM CHLORIDE 75 ML/HR: 9 INJECTION, SOLUTION INTRAVENOUS at 17:48

## 2023-03-14 RX ADMIN — FENTANYL CITRATE 50 MCG: 50 INJECTION, SOLUTION INTRAMUSCULAR; INTRAVENOUS at 15:16

## 2023-03-14 RX ADMIN — HYDROMORPHONE HYDROCHLORIDE 0.5 MG: 1 INJECTION, SOLUTION INTRAMUSCULAR; INTRAVENOUS; SUBCUTANEOUS at 20:22

## 2023-03-14 RX ADMIN — IOPAMIDOL 100 ML: 755 INJECTION, SOLUTION INTRAVENOUS at 12:39

## 2023-03-14 RX ADMIN — LEVOFLOXACIN 500 MG: 5 INJECTION, SOLUTION INTRAVENOUS at 18:50

## 2023-03-14 RX ADMIN — ACETAMINOPHEN 650 MG: 325 TABLET ORAL at 21:55

## 2023-03-14 NOTE — PROGRESS NOTES
Problem: Pain  Goal: *Control of Pain  Outcome: Progressing Towards Goal  Goal: *PALLIATIVE CARE:  Alleviation of Pain  Outcome: Progressing Towards Goal     Problem: Patient Education: Go to Patient Education Activity  Goal: Patient/Family Education  Outcome: Progressing Towards Goal     Problem: Falls - Risk of  Goal: *Absence of Falls  Description: Document Yary Ghotra Fall Risk and appropriate interventions in the flowsheet.   Outcome: Progressing Towards Goal  Note: Fall Risk Interventions:            Medication Interventions: Patient to call before getting OOB, Teach patient to arise slowly                   Problem: Patient Education: Go to Patient Education Activity  Goal: Patient/Family Education  Outcome: Progressing Towards Goal

## 2023-03-14 NOTE — PROGRESS NOTES
3/14/2023  12:21 PM  Care Management Progress Note      ICD-10-CM ICD-9-CM    1. Nephrolithiasis  N20.0 592.0       2. Renal abscess  N15.1 590.2       3. Bacteriuria  R82.71 791.9           RUR:  4%  Risk Level: [x]Low []Moderate []High  Value-based purchasing: [x] Yes [] No  Bundle patient: [] Yes [x] No   Specify:     Transition of care plan:  Awaiting medical clearance and Dc order. Urology following. Home with family assistance. Outpatient follow-up. Pt's family to transport.

## 2023-03-14 NOTE — PROGRESS NOTES
[facility] Dosing Services: Antimicrobial Stewardship Daily Doc    Consult for antibiotic dosing of vancomycin by Dr. Roseline Tyler  Indication: Renal abscess  Day of Therapy: 3    Ht Readings from Last 1 Encounters:   03/11/23 177.8 cm (70\")        Wt Readings from Last 1 Encounters:   03/11/23 81.2 kg (179 lb)        Vancomycin therapy:  Current maintenance dose: vancomycin 1000 mg IV every 12 hours     Dose calculated to approximate a           a. Target AUC/ALYSHA of 400-600          b. Trough of N/A mcg/mL     Assessment/Plan:  SCr remains stable and likely at baseline, no leukocytosis, no procalcitonin to evaluate, remains afebrile, urine culture is in process, urine output appears low but unsure if documentation is accurate  Vancomycin level collected on 3/14 at 0402 = 20.4 mcg/mL. [facility] calculates AUC of 455 which is within therapeutic target range. Continue current regimen which is 12.3 mg/kg. BMP ordered daily by MD  Dose administration notes:   Doses given appropriately as scheduled    Other Antimicrobial   (not dosed by pharmacist) Levofloxacin 500 mg IV every 24 hours   Cultures 3/13 - Urine - In process   Serum Creatinine Lab Results   Component Value Date/Time    Creatinine 0.60 03/14/2023 04:02 AM         Creatinine Clearance Estimated Creatinine Clearance: 97 mL/min (by C-G formula based on SCr of 0.6 mg/dL). Temp Temp: 98.1 °F (36.7 °C)       WBC Lab Results   Component Value Date/Time    WBC 5.3 03/14/2023 09:24 AM        Procalcitonin No results found for: PCT     For Antifungals, Metronidazole and Nafcillin: Lab Results   Component Value Date/Time    ALT (SGPT) 16 03/11/2023 03:14 PM    AST (SGOT) 31 03/11/2023 03:14 PM    Alk.  phosphatase 172 (H) 03/11/2023 03:14 PM    Bilirubin, total 0.4 03/11/2023 03:14 PM        Pharmacist Siomara Wasserman, PIYUS[facility]

## 2023-03-14 NOTE — PROGRESS NOTES
Problem: Pain  Goal: *Control of Pain  3/14/2023 1624 by Noemi Fisher LPN  Outcome: Progressing Towards Goal  3/14/2023 1617 by Noemi Fisher LPN  Outcome: Progressing Towards Goal  Goal: *PALLIATIVE CARE:  Alleviation of Pain  3/14/2023 1624 by Noemi Fisher LPN  Outcome: Progressing Towards Goal  3/14/2023 1617 by Noemi Fisher LPN  Outcome: Progressing Towards Goal     Problem: Patient Education: Go to Patient Education Activity  Goal: Patient/Family Education  3/14/2023 1624 by Noemi Fisher LPN  Outcome: Progressing Towards Goal  3/14/2023 1617 by Noemi Fisher LPN  Outcome: Progressing Towards Goal     Problem: Falls - Risk of  Goal: *Absence of Falls  Description: Document Ladona Edelmira Fall Risk and appropriate interventions in the flowsheet.   3/14/2023 1624 by Noemi Fisher LPN  Outcome: Progressing Towards Goal  Note: Fall Risk Interventions:            Medication Interventions: Patient to call before getting OOB                3/14/2023 1617 by Noemi Fisher LPN  Outcome: Progressing Towards Goal  Note: Fall Risk Interventions:            Medication Interventions: Patient to call before getting OOB

## 2023-03-14 NOTE — PROGRESS NOTES
Physician Progress Note      Xin Castro  CSN #:                  [id number]  :                       1965  ADMIT DATE:       3/11/2023 2:01 PM  [address] [place] DATE:  RESPONDING  PROVIDER #:        Rajesh Duarte MD        QUERY TEXT:    Type of Anemia: Please provide further specificity, if known. Clinical indicators include: hgb, hct, iron, vitamin b12, anemia, ferritin, tibc, iron supplementation  Options provided:  -- Anemia due to acute blood loss  -- Anemia due to chronic blood loss  -- Anemia due to iron deficiency  -- Anemia due to postoperative blood loss  -- Anemia due to chronic disease  -- Other - I will add my own diagnosis  -- Disagree - Not applicable / Not valid  -- Disagree - Clinically Unable to determine / Unknown        PROVIDER RESPONSE TEXT:    The patient has anemia due to iron deficiency.       Electronically signed by:  Rajesh Duarte MD 3/14/2023 7:32 PM

## 2023-03-14 NOTE — ADVANCED PRACTICE NURSE
Nursing offers continued generalized itch and rash. Pt states both started after eating tuna sandwich from [employer]. Pt denies any known allergies and has eaten the sandwich prior w/o any incident. Pt denies SOB, difficulty breathing or swallowing, or tightness in chest. Pt has tried oral Benadryl prior and denies relief. Pt would like to try IV Benadryl, however, her BP has been soft. I have discussed common SE of medication - including drowsiness - which may result in a drop in blood pressure. Despite this, pt would like to move forward w/ IV Benadryl. Will provide small dose. Nursing to continue to monitor. Nursing present during this conversation.

## 2023-03-14 NOTE — PROGRESS NOTES
[facility] Vega [facility]  [address] [facility], [address] [place] [address]  [phone]      Hospitalist  Progress Note      NAME:         Lefty Roach   :        1965  MRM:        [id number]    Date of service: 3/14/2023      Subjective: Patient seen and examined by me. Patient admitted with abdominal  pain. No pain today. No fever or chills. No nausea or vomiting. Anxious about going home soon. Has been having Hives. Receiving Benadryl, changed cefepime to levaquin. Discussed with pharmacy, consutled ID.  Plan for scope in AM    Objective:    Vital Signs:    Visit Vitals  /70   Pulse 61   Temp 98.2 °F (36.8 °C)   Resp 16   Ht 5' 10\" (1.778 m)   Wt 81.2 kg (179 lb)   SpO2 98%   Breastfeeding No   BMI 25.68 kg/m²        Intake/Output Summary (Last 24 hours) at 3/14/2023 1621  Last data filed at 3/14/2023 0600  Gross per 24 hour   Intake 1260.42 ml   Output 450 ml   Net 810.42 ml          Current inpatient medications reviewed:  Current Facility-Administered Medications   Medication Dose Route Frequency    ferrous gluconate 324 mg (38 mg iron) tablet 1 Tablet  1 Tablet Oral DAILY WITH BREAKFAST    0.9% sodium chloride infusion  75 mL/hr IntraVENous CONTINUOUS    levoFLOXacin (LEVAQUIN) 500 mg in D5W IVPB  500 mg IntraVENous Q24H    diphenhydrAMINE (BENADRYL) capsule 25 mg  25 mg Oral Q6H PRN    hydrocortisone (CORTAID) 1 % cream   Topical PRN    sodium chloride (NS) flush 5-40 mL  5-40 mL IntraVENous Q8H    sodium chloride (NS) flush 5-40 mL  5-40 mL IntraVENous PRN    acetaminophen (TYLENOL) tablet 650 mg  650 mg Oral Q6H PRN    Or    acetaminophen (TYLENOL) suppository 650 mg  650 mg Rectal Q6H PRN    polyethylene glycol (MIRALAX) packet 17 g  17 g Oral DAILY PRN    ondansetron (ZOFRAN ODT) tablet 4 mg  4 mg Oral Q8H PRN    Or    ondansetron (ZOFRAN) injection 4 mg  4 mg IntraVENous Q6H PRN    fentaNYL citrate (PF) injection 50 mcg  50 mcg IntraVENous Q4H PRN    tamsulosin (FLOMAX) capsule 0.4 mg  0.4 mg Oral DAILY    vancomycin (VANCOCIN) 1,000 mg in 0.9% sodium chloride 250 mL ([id number])  1,000 mg IntraVENous Q12H    ketorolac (TORADOL) injection 15 mg  15 mg IntraVENous Q6H PRN       Physical Examination:    General:   Weak and ill looking but not in distress   Eyes:   pink conjunctivae, PERRLA with no discharge. ENT:   no ottorrhea or rhinorrhea with dry mucous membranes  Neck: no masses, thyroid non-tender and trachea central.  Pulm:  clear breath sounds without crackles or wheezes  Card:  has regular and normal S1, S2 without thrills, bruits or peripheral edema  Abd:  Soft, non-tender, non-distended, normoactive bowel sounds   Musc:  No cyanosis, clubbing, atrophy or deformities. Skin:  No rashes, bruising or ulcers. Neuro: Awake and alert. Generally a non focal exam. Follows commands appropriately  Psych:  Has a good insight and is oriented x 3    Diagnostic testing:    Laboratory data reviewed and independently interpreted:    Recent Labs     03/14/23  0924 03/12/23  0647   WBC 5.3 5.6   HGB 11.7 10.4*   HCT 37.3 32.5*    254       Recent Labs     03/14/23  0402 03/13/23  0407 03/12/23  0647    138 137   K 3.6 3.5 3.5   * 107 102   CO2 28 29 31   GLU 89 90 87   BUN 11 12 9   CREA 0.60 0.59 0.64   CA 8.9 8.7 9.1       No components found for: GLPOC    Cultures, Imaging studies reviewed and reports noted, Telemetry reviewed and independently interpreted by me - all reviewed in [facility]     Notes reviewed: Consults, CM, therapies     Assessment and Plan:    Renal abscess, right (3/11/2023) POA: likely due to the obstructing ureteric stone. CT scan abdomen showed a right upper pole hypodense mass with a hyperdense rim measures 23 x 27 x 30 mm, and likely represents a renal abscess or less likely calyceal rupture. UA unremarkable. IV Cefepime changed to Levaquin 2/2 Hives and IV Vancomycin.  CT repeat with increasing inflammation. Plan for cystoscopy in AM. ID consulted. Obstruction of left ureteropelvic junction due to stone / Hydronephrosis with urinary obstruction due to renal calculus (3/12/2023) POA: CT noted an obstructing calculus in the right upper pole collecting system measures 6 mm. There is focal hydronephrosis of several upper pole calyces. CT today shows worsening inflammation. Continue to keep NPO. IV fluids. IV Fentanyl PRN changed to Dilaudid PRN for Hives and concern is cause of RXN. severe pain. Cystoscopy in AM. Start Flomax    Essential hypertension (3/29/2016) POA: BPs low normal now. Hold all meds. Continue IV fluids and monitor    Weakness / Hx gastric bypass POA: check an iron profile, vitamin B12 levels    Anemia: Ferritin quite low at 20, with sat 9%, and iron 29. TIBC 309, will start Iron supplementation    Care Plan discussed with: Patient, Nursing. Prophylaxis:  SCD's                Anticipated Disposition:  Home w/Family    PCP:      Deanne Rudolph MD     I have personally examined and treated the patient at bedside during this period.  To assist coordination of care and communication with nursing and staff, this note may be preliminary early in the day, but finalized by end of the day.         ___________________________________________________    Attending Physician:   Sixto Silverman MD

## 2023-03-14 NOTE — PROGRESS NOTES
Bedside shift change report given to Lety Mcgraw (oncoming nurse) by Kelvin Sandoval (offgoing nurse). Report included the following information SBAR, Intake/Output, MAR, Recent Results, Med Rec Status, and Cardiac Rhythm NSR .

## 2023-03-14 NOTE — PROGRESS NOTES
Urology Progress Note    Patient: Ruthann Crystal MRN: [id number]  SSN: [id number]    YOB: 1965  Age: 65 y.o. Sex: female        Assessment:     Well appearing   Reports return of mild right flank pain overnight   No leukocytosis  Cr WNL   Afebrile, BP soft     Plan:     2cm right renal abscess, right nonobstructing stone   -Case discussed with IR 3/12- abscess too small to drain. Continue broad spectrum abx. CT abd/pelv WWO today to reassess abscess, hopeful for improvement. No urgent intervention for stone at this time. Addendum: CT abd/pelv images personally reviewed, discussed with radiology and Dr. Majo Pozo. Right renal abscess is slightly decreased in size with decreased surrounding inflammation. Right sided stone now migrated into the proximal ureter with mild hydro. IR does not feel abscess is of drainable size. Plan for MET overnight (toradol q6, had flomax today, zofran if nauseous, hydration with IV fluids). Strain urine. KUB in AM. NPO after midnight for possible cystoscopy, right RPG, right ureteral stent insertion. UCX resulted negative. No leukocytosis, but renal abscess has responded to broad spectrum abx. ID consult to assist with abx upon DC- greatly appreciated.      Reason For Visit:     Follow up for 2cm right renal abscess, right nonobstructing stone     ROS:     Denies fevers  Denies abdominal pain  Reports right flank pain   Denies hematuria, frequency    Objective:     Visit Vitals  /68   Pulse 61   Temp 98.1 °F (36.7 °C)   Resp 16   Ht 5' 10\" (1.778 m)   Wt 81.2 kg (179 lb)   SpO2 98%   Breastfeeding No   BMI 25.68 kg/m²         Intake/Output Summary (Last 24 hours) at 3/14/2023 1047  Last data filed at 3/14/2023 0600  Gross per 24 hour   Intake 1260.42 ml   Output 450 ml   Net 810.42 ml       Physical Exam  General: NAD, well appearing   Respiratory: no distress, room air  Abdomen: soft, no distention  : right CVA tenderness, voiding independently   Neuro: Appropriate, no focal neurological deficits    Labs reviewed, March 14, 2023  Recent Results (from the past 24 hour(s))   SAMPLES BEING HELD    Collection Time: 03/13/23  4:14 PM   Result Value Ref Range    SAMPLES BEING HELD 1ua     COMMENT        Add-on orders for these samples will be processed based on acceptable specimen integrity and analyte stability, which may vary by analyte. METABOLIC PANEL, BASIC    Collection Time: 03/14/23  4:02 AM   Result Value Ref Range    Sodium 141 136 - 145 mmol/L    Potassium 3.6 3.5 - 5.1 mmol/L    Chloride 111 (H) 97 - 108 mmol/L    CO2 28 21 - 32 mmol/L    Anion gap 2 (L) 5 - 15 mmol/L    Glucose 89 65 - 100 mg/dL    BUN 11 6 - 20 MG/DL    Creatinine 0.60 0.55 - 1.02 MG/DL    BUN/Creatinine ratio 18 12 - 20      eGFR >60 >60 ml/min/1.73m2    Calcium 8.9 8.5 - 10.1 MG/DL   VANCOMYCIN, RANDOM    Collection Time: 03/14/23  4:02 AM   Result Value Ref Range    Vancomycin, random 20.4 UG/ML   CBC WITH AUTOMATED DIFF    Collection Time: 03/14/23  9:24 AM   Result Value Ref Range    WBC 5.3 3.6 - 11.0 K/uL    RBC 4.11 3.80 - 5.20 M/uL    HGB 11.7 11.5 - 16.0 g/dL    HCT 37.3 35.0 - 47.0 %    MCV 90.8 80.0 - 99.0 FL    MCH 28.5 26.0 - 34.0 PG    MCHC 31.4 30.0 - 36.5 g/dL    RDW 13.8 11.5 - 14.5 %    PLATELET 230 994 - 942 K/uL    MPV 9.7 8.9 - 12.9 FL    NRBC 0.0 0  WBC    ABSOLUTE NRBC 0.00 0.00 - 0.01 K/uL    NEUTROPHILS 73 32 - 75 %    LYMPHOCYTES 18 12 - 49 %    MONOCYTES 7 5 - 13 %    EOSINOPHILS 2 0 - 7 %    BASOPHILS 0 0 - 1 %    IMMATURE GRANULOCYTES 0 0.0 - 0.5 %    ABS. NEUTROPHILS 3.8 1.8 - 8.0 K/UL    ABS. LYMPHOCYTES 1.0 0.8 - 3.5 K/UL    ABS. MONOCYTES 0.4 0.0 - 1.0 K/UL    ABS. EOSINOPHILS 0.1 0.0 - 0.4 K/UL    ABS. BASOPHILS 0.0 0.0 - 0.1 K/UL    ABS. IMM.  GRANS. 0.0 0.00 - 0.04 K/UL    DF AUTOMATED         Imaging:  CT Results  (Last 48 hours)      None            Signed By: Loan Wharton, VINNY - March 14, 2023

## 2023-03-14 NOTE — PROGRESS NOTES
Problem: Pain  Goal: *Control of Pain  Outcome: Progressing Towards Goal  Goal: *PALLIATIVE CARE:  Alleviation of Pain  Outcome: Progressing Towards Goal     Problem: Patient Education: Go to Patient Education Activity  Goal: Patient/Family Education  Outcome: Progressing Towards Goal     Problem: Falls - Risk of  Goal: *Absence of Falls  Description: Document Brenda Mend Fall Risk and appropriate interventions in the flowsheet.   Outcome: Progressing Towards Goal  Note: Fall Risk Interventions:            Medication Interventions: Patient to call before getting OOB

## 2023-03-15 ENCOUNTER — APPOINTMENT (OUTPATIENT)
Dept: GENERAL RADIOLOGY | Age: 58
End: 2023-03-15
Attending: UROLOGY
Payer: COMMERCIAL

## 2023-03-15 ENCOUNTER — APPOINTMENT (OUTPATIENT)
Dept: GENERAL RADIOLOGY | Age: 58
End: 2023-03-15
Attending: NURSE PRACTITIONER
Payer: COMMERCIAL

## 2023-03-15 ENCOUNTER — ANESTHESIA EVENT (OUTPATIENT)
Dept: SURGERY | Age: 58
End: 2023-03-15
Payer: COMMERCIAL

## 2023-03-15 ENCOUNTER — ANESTHESIA (OUTPATIENT)
Dept: SURGERY | Age: 58
End: 2023-03-15
Payer: COMMERCIAL

## 2023-03-15 LAB
ANION GAP SERPL CALC-SCNC: 5 MMOL/L (ref 5–15)
BASOPHILS # BLD: 0 K/UL (ref 0–0.1)
BASOPHILS NFR BLD: 0 % (ref 0–1)
BUN SERPL-MCNC: 17 MG/DL (ref 6–20)
BUN/CREAT SERPL: 18 (ref 12–20)
CALCIUM SERPL-MCNC: 8.6 MG/DL (ref 8.5–10.1)
CHLORIDE SERPL-SCNC: 108 MMOL/L (ref 97–108)
CO2 SERPL-SCNC: 24 MMOL/L (ref 21–32)
CREAT SERPL-MCNC: 0.92 MG/DL (ref 0.55–1.02)
DIFFERENTIAL METHOD BLD: ABNORMAL
EOSINOPHIL # BLD: 0.1 K/UL (ref 0–0.4)
EOSINOPHIL NFR BLD: 1 % (ref 0–7)
ERYTHROCYTE [DISTWIDTH] IN BLOOD BY AUTOMATED COUNT: 13.6 % (ref 11.5–14.5)
GLUCOSE SERPL-MCNC: 124 MG/DL (ref 65–100)
HCT VFR BLD AUTO: 34 % (ref 35–47)
HGB BLD-MCNC: 10.7 G/DL (ref 11.5–16)
IMM GRANULOCYTES # BLD AUTO: 0 K/UL (ref 0–0.04)
IMM GRANULOCYTES NFR BLD AUTO: 0 % (ref 0–0.5)
LYMPHOCYTES # BLD: 0.7 K/UL (ref 0.8–3.5)
LYMPHOCYTES NFR BLD: 8 % (ref 12–49)
MCH RBC QN AUTO: 28.6 PG (ref 26–34)
MCHC RBC AUTO-ENTMCNC: 31.5 G/DL (ref 30–36.5)
MCV RBC AUTO: 90.9 FL (ref 80–99)
MONOCYTES # BLD: 0.7 K/UL (ref 0–1)
MONOCYTES NFR BLD: 7 % (ref 5–13)
NEUTS SEG # BLD: 7.8 K/UL (ref 1.8–8)
NEUTS SEG NFR BLD: 84 % (ref 32–75)
NRBC # BLD: 0 K/UL (ref 0–0.01)
NRBC BLD-RTO: 0 PER 100 WBC
PLATELET # BLD AUTO: 250 K/UL (ref 150–400)
PMV BLD AUTO: 10 FL (ref 8.9–12.9)
POTASSIUM SERPL-SCNC: 4 MMOL/L (ref 3.5–5.1)
RBC # BLD AUTO: 3.74 M/UL (ref 3.8–5.2)
RBC MORPH BLD: ABNORMAL
SODIUM SERPL-SCNC: 137 MMOL/L (ref 136–145)
WBC # BLD AUTO: 9.3 K/UL (ref 3.6–11)

## 2023-03-15 PROCEDURE — 74011250636 HC RX REV CODE- 250/636: Performed by: UROLOGY

## 2023-03-15 PROCEDURE — 74011250636 HC RX REV CODE- 250/636: Performed by: HOSPITALIST

## 2023-03-15 PROCEDURE — 2709999900 HC NON-CHARGEABLE SUPPLY: Performed by: UROLOGY

## 2023-03-15 PROCEDURE — 74011000636 HC RX REV CODE- 636: Performed by: UROLOGY

## 2023-03-15 PROCEDURE — C1769 GUIDE WIRE: HCPCS | Performed by: UROLOGY

## 2023-03-15 PROCEDURE — 77030040361 HC SLV COMPR DVT MDII -B

## 2023-03-15 PROCEDURE — 74011000250 HC RX REV CODE- 250: Performed by: HOSPITALIST

## 2023-03-15 PROCEDURE — 74011250636 HC RX REV CODE- 250/636: Performed by: NURSE PRACTITIONER

## 2023-03-15 PROCEDURE — 76210000016 HC OR PH I REC 1 TO 1.5 HR: Performed by: UROLOGY

## 2023-03-15 PROCEDURE — 74011000250 HC RX REV CODE- 250: Performed by: UROLOGY

## 2023-03-15 PROCEDURE — 74011250637 HC RX REV CODE- 250/637: Performed by: UROLOGY

## 2023-03-15 PROCEDURE — 74018 RADEX ABDOMEN 1 VIEW: CPT

## 2023-03-15 PROCEDURE — 94761 N-INVAS EAR/PLS OXIMETRY MLT: CPT

## 2023-03-15 PROCEDURE — 65270000029 HC RM PRIVATE

## 2023-03-15 PROCEDURE — 87086 URINE CULTURE/COLONY COUNT: CPT

## 2023-03-15 PROCEDURE — 36415 COLL VENOUS BLD VENIPUNCTURE: CPT

## 2023-03-15 PROCEDURE — C2617 STENT, NON-COR, TEM W/O DEL: HCPCS | Performed by: UROLOGY

## 2023-03-15 PROCEDURE — 74011250636 HC RX REV CODE- 250/636: Performed by: ANESTHESIOLOGY

## 2023-03-15 PROCEDURE — 76010000138 HC OR TIME 0.5 TO 1 HR: Performed by: UROLOGY

## 2023-03-15 PROCEDURE — 76060000032 HC ANESTHESIA 0.5 TO 1 HR: Performed by: UROLOGY

## 2023-03-15 PROCEDURE — 74011250636 HC RX REV CODE- 250/636: Performed by: INTERNAL MEDICINE

## 2023-03-15 PROCEDURE — 74011250637 HC RX REV CODE- 250/637: Performed by: HOSPITALIST

## 2023-03-15 PROCEDURE — 74011000250 HC RX REV CODE- 250: Performed by: ANESTHESIOLOGY

## 2023-03-15 PROCEDURE — 74011000250 HC RX REV CODE- 250: Performed by: NURSE ANESTHETIST, CERTIFIED REGISTERED

## 2023-03-15 PROCEDURE — 80048 BASIC METABOLIC PNL TOTAL CA: CPT

## 2023-03-15 PROCEDURE — C1758 CATHETER, URETERAL: HCPCS | Performed by: UROLOGY

## 2023-03-15 PROCEDURE — 77030020143 HC AIRWY LARYN INTUB CGAS -A: Performed by: ANESTHESIOLOGY

## 2023-03-15 PROCEDURE — 0T768DZ DILATION OF RIGHT URETER WITH INTRALUMINAL DEVICE, VIA NATURAL OR ARTIFICIAL OPENING ENDOSCOPIC: ICD-10-PCS | Performed by: UROLOGY

## 2023-03-15 PROCEDURE — 85025 COMPLETE CBC W/AUTO DIFF WBC: CPT

## 2023-03-15 PROCEDURE — 74011250636 HC RX REV CODE- 250/636: Performed by: NURSE ANESTHETIST, CERTIFIED REGISTERED

## 2023-03-15 PROCEDURE — 77030040922 HC BLNKT HYPOTHRM STRY -A

## 2023-03-15 DEVICE — URETERAL STENT
Type: IMPLANTABLE DEVICE | Site: URETER | Status: FUNCTIONAL
Brand: CONTOUR™

## 2023-03-15 RX ORDER — FENTANYL CITRATE 50 UG/ML
INJECTION, SOLUTION INTRAMUSCULAR; INTRAVENOUS AS NEEDED
Status: DISCONTINUED | OUTPATIENT
Start: 2023-03-15 | End: 2023-03-15 | Stop reason: HOSPADM

## 2023-03-15 RX ORDER — PROPOFOL 10 MG/ML
INJECTION, EMULSION INTRAVENOUS AS NEEDED
Status: DISCONTINUED | OUTPATIENT
Start: 2023-03-15 | End: 2023-03-15 | Stop reason: HOSPADM

## 2023-03-15 RX ORDER — PHENYLEPHRINE HCL IN 0.9% NACL 0.4MG/10ML
SYRINGE (ML) INTRAVENOUS AS NEEDED
Status: DISCONTINUED | OUTPATIENT
Start: 2023-03-15 | End: 2023-03-15 | Stop reason: HOSPADM

## 2023-03-15 RX ORDER — ALBUTEROL SULFATE 0.83 MG/ML
2.5 SOLUTION RESPIRATORY (INHALATION) AS NEEDED
Status: DISCONTINUED | OUTPATIENT
Start: 2023-03-15 | End: 2023-03-15 | Stop reason: HOSPADM

## 2023-03-15 RX ORDER — ONDANSETRON 2 MG/ML
4 INJECTION INTRAMUSCULAR; INTRAVENOUS AS NEEDED
Status: DISCONTINUED | OUTPATIENT
Start: 2023-03-15 | End: 2023-03-15 | Stop reason: HOSPADM

## 2023-03-15 RX ORDER — FLUMAZENIL 0.1 MG/ML
0.2 INJECTION INTRAVENOUS
Status: DISCONTINUED | OUTPATIENT
Start: 2023-03-15 | End: 2023-03-16

## 2023-03-15 RX ORDER — SODIUM CHLORIDE 0.9 % (FLUSH) 0.9 %
5-40 SYRINGE (ML) INJECTION EVERY 8 HOURS
Status: DISCONTINUED | OUTPATIENT
Start: 2023-03-15 | End: 2023-03-16

## 2023-03-15 RX ORDER — SODIUM CHLORIDE, SODIUM LACTATE, POTASSIUM CHLORIDE, CALCIUM CHLORIDE 600; 310; 30; 20 MG/100ML; MG/100ML; MG/100ML; MG/100ML
125 INJECTION, SOLUTION INTRAVENOUS CONTINUOUS
Status: DISCONTINUED | OUTPATIENT
Start: 2023-03-15 | End: 2023-03-15 | Stop reason: HOSPADM

## 2023-03-15 RX ORDER — SODIUM CHLORIDE 0.9 % (FLUSH) 0.9 %
5-40 SYRINGE (ML) INJECTION AS NEEDED
Status: DISCONTINUED | OUTPATIENT
Start: 2023-03-15 | End: 2023-03-16

## 2023-03-15 RX ORDER — NALOXONE HYDROCHLORIDE 0.4 MG/ML
0.04 INJECTION, SOLUTION INTRAMUSCULAR; INTRAVENOUS; SUBCUTANEOUS
Status: DISCONTINUED | OUTPATIENT
Start: 2023-03-15 | End: 2023-03-16

## 2023-03-15 RX ORDER — DIPHENHYDRAMINE HYDROCHLORIDE 50 MG/ML
12.5 INJECTION, SOLUTION INTRAMUSCULAR; INTRAVENOUS AS NEEDED
Status: DISCONTINUED | OUTPATIENT
Start: 2023-03-15 | End: 2023-03-15 | Stop reason: HOSPADM

## 2023-03-15 RX ORDER — LIDOCAINE HYDROCHLORIDE 20 MG/ML
INJECTION, SOLUTION EPIDURAL; INFILTRATION; INTRACAUDAL; PERINEURAL AS NEEDED
Status: DISCONTINUED | OUTPATIENT
Start: 2023-03-15 | End: 2023-03-15 | Stop reason: HOSPADM

## 2023-03-15 RX ORDER — FENTANYL CITRATE 50 UG/ML
25 INJECTION, SOLUTION INTRAMUSCULAR; INTRAVENOUS
Status: DISCONTINUED | OUTPATIENT
Start: 2023-03-15 | End: 2023-03-15 | Stop reason: HOSPADM

## 2023-03-15 RX ORDER — SODIUM CHLORIDE 0.9 % (FLUSH) 0.9 %
5-40 SYRINGE (ML) INJECTION EVERY 8 HOURS
Status: DISCONTINUED | OUTPATIENT
Start: 2023-03-15 | End: 2023-03-15 | Stop reason: HOSPADM

## 2023-03-15 RX ORDER — ONDANSETRON 2 MG/ML
INJECTION INTRAMUSCULAR; INTRAVENOUS AS NEEDED
Status: DISCONTINUED | OUTPATIENT
Start: 2023-03-15 | End: 2023-03-15 | Stop reason: HOSPADM

## 2023-03-15 RX ORDER — MIDAZOLAM HYDROCHLORIDE 1 MG/ML
INJECTION, SOLUTION INTRAMUSCULAR; INTRAVENOUS AS NEEDED
Status: DISCONTINUED | OUTPATIENT
Start: 2023-03-15 | End: 2023-03-15 | Stop reason: HOSPADM

## 2023-03-15 RX ORDER — LIDOCAINE HYDROCHLORIDE 10 MG/ML
0.1 INJECTION, SOLUTION EPIDURAL; INFILTRATION; INTRACAUDAL; PERINEURAL AS NEEDED
Status: DISCONTINUED | OUTPATIENT
Start: 2023-03-15 | End: 2023-03-16

## 2023-03-15 RX ORDER — KETOROLAC TROMETHAMINE 30 MG/ML
INJECTION, SOLUTION INTRAMUSCULAR; INTRAVENOUS AS NEEDED
Status: DISCONTINUED | OUTPATIENT
Start: 2023-03-15 | End: 2023-03-15 | Stop reason: HOSPADM

## 2023-03-15 RX ORDER — SODIUM CHLORIDE, SODIUM LACTATE, POTASSIUM CHLORIDE, CALCIUM CHLORIDE 600; 310; 30; 20 MG/100ML; MG/100ML; MG/100ML; MG/100ML
125 INJECTION, SOLUTION INTRAVENOUS CONTINUOUS
Status: DISPENSED | OUTPATIENT
Start: 2023-03-15 | End: 2023-03-16

## 2023-03-15 RX ORDER — HYDROMORPHONE HYDROCHLORIDE 1 MG/ML
.25-1 INJECTION, SOLUTION INTRAMUSCULAR; INTRAVENOUS; SUBCUTANEOUS
Status: DISCONTINUED | OUTPATIENT
Start: 2023-03-15 | End: 2023-03-15 | Stop reason: HOSPADM

## 2023-03-15 RX ORDER — SODIUM CHLORIDE 0.9 % (FLUSH) 0.9 %
5-40 SYRINGE (ML) INJECTION AS NEEDED
Status: DISCONTINUED | OUTPATIENT
Start: 2023-03-15 | End: 2023-03-15 | Stop reason: HOSPADM

## 2023-03-15 RX ORDER — DEXAMETHASONE SODIUM PHOSPHATE 4 MG/ML
INJECTION, SOLUTION INTRA-ARTICULAR; INTRALESIONAL; INTRAMUSCULAR; INTRAVENOUS; SOFT TISSUE AS NEEDED
Status: DISCONTINUED | OUTPATIENT
Start: 2023-03-15 | End: 2023-03-15 | Stop reason: HOSPADM

## 2023-03-15 RX ADMIN — LEVOFLOXACIN 500 MG: 5 INJECTION, SOLUTION INTRAVENOUS at 18:50

## 2023-03-15 RX ADMIN — SODIUM CHLORIDE 75 ML/HR: 9 INJECTION, SOLUTION INTRAVENOUS at 00:25

## 2023-03-15 RX ADMIN — HYDROMORPHONE HYDROCHLORIDE 0.5 MG: 1 INJECTION, SOLUTION INTRAMUSCULAR; INTRAVENOUS; SUBCUTANEOUS at 06:18

## 2023-03-15 RX ADMIN — Medication 80 MCG: at 10:04

## 2023-03-15 RX ADMIN — SODIUM CHLORIDE, PRESERVATIVE FREE 10 ML: 5 INJECTION INTRAVENOUS at 00:24

## 2023-03-15 RX ADMIN — SODIUM CHLORIDE, PRESERVATIVE FREE 10 ML: 5 INJECTION INTRAVENOUS at 19:43

## 2023-03-15 RX ADMIN — MIDAZOLAM HYDROCHLORIDE 2 MG: 1 INJECTION, SOLUTION INTRAMUSCULAR; INTRAVENOUS at 09:39

## 2023-03-15 RX ADMIN — VANCOMYCIN HYDROCHLORIDE 1000 MG: 1 INJECTION, POWDER, LYOPHILIZED, FOR SOLUTION INTRAVENOUS at 21:16

## 2023-03-15 RX ADMIN — HYDROMORPHONE HYDROCHLORIDE 0.5 MG: 1 INJECTION, SOLUTION INTRAMUSCULAR; INTRAVENOUS; SUBCUTANEOUS at 00:24

## 2023-03-15 RX ADMIN — SODIUM CHLORIDE, PRESERVATIVE FREE 10 ML: 5 INJECTION INTRAVENOUS at 14:15

## 2023-03-15 RX ADMIN — SODIUM CHLORIDE, PRESERVATIVE FREE 10 ML: 5 INJECTION INTRAVENOUS at 08:41

## 2023-03-15 RX ADMIN — ONDANSETRON 4 MG: 2 INJECTION INTRAMUSCULAR; INTRAVENOUS at 00:36

## 2023-03-15 RX ADMIN — SODIUM CHLORIDE, POTASSIUM CHLORIDE, SODIUM LACTATE AND CALCIUM CHLORIDE 125 ML/HR: 600; 310; 30; 20 INJECTION, SOLUTION INTRAVENOUS at 08:41

## 2023-03-15 RX ADMIN — ONDANSETRON HYDROCHLORIDE 4 MG: 2 SOLUTION INTRAMUSCULAR; INTRAVENOUS at 10:14

## 2023-03-15 RX ADMIN — Medication 40 MCG: at 10:13

## 2023-03-15 RX ADMIN — PROPOFOL 150 MG: 10 INJECTION, EMULSION INTRAVENOUS at 09:47

## 2023-03-15 RX ADMIN — SODIUM CHLORIDE, PRESERVATIVE FREE 10 ML: 5 INJECTION INTRAVENOUS at 21:22

## 2023-03-15 RX ADMIN — DEXAMETHASONE SODIUM PHOSPHATE 4 MG: 4 INJECTION, SOLUTION INTRAMUSCULAR; INTRAVENOUS at 09:53

## 2023-03-15 RX ADMIN — SODIUM CHLORIDE, PRESERVATIVE FREE 10 ML: 5 INJECTION INTRAVENOUS at 14:16

## 2023-03-15 RX ADMIN — HYDROMORPHONE HYDROCHLORIDE 0.5 MG: 1 INJECTION, SOLUTION INTRAMUSCULAR; INTRAVENOUS; SUBCUTANEOUS at 19:43

## 2023-03-15 RX ADMIN — LIDOCAINE HYDROCHLORIDE 60 MG: 20 INJECTION, SOLUTION EPIDURAL; INFILTRATION; INTRACAUDAL; PERINEURAL at 09:47

## 2023-03-15 RX ADMIN — ACETAMINOPHEN 650 MG: 325 TABLET ORAL at 14:24

## 2023-03-15 RX ADMIN — FENTANYL CITRATE 50 MCG: 50 INJECTION, SOLUTION INTRAMUSCULAR; INTRAVENOUS at 09:44

## 2023-03-15 RX ADMIN — SODIUM CHLORIDE, PRESERVATIVE FREE 20 ML: 5 INJECTION INTRAVENOUS at 08:19

## 2023-03-15 RX ADMIN — ACETAMINOPHEN 650 MG: 325 TABLET ORAL at 03:30

## 2023-03-15 RX ADMIN — SODIUM CHLORIDE 75 ML/HR: 9 INJECTION, SOLUTION INTRAVENOUS at 21:14

## 2023-03-15 RX ADMIN — VANCOMYCIN HYDROCHLORIDE 1000 MG: 1 INJECTION, POWDER, LYOPHILIZED, FOR SOLUTION INTRAVENOUS at 08:40

## 2023-03-15 RX ADMIN — KETOROLAC TROMETHAMINE 30 MG: 30 INJECTION, SOLUTION INTRAMUSCULAR; INTRAVENOUS at 10:13

## 2023-03-15 NOTE — PROGRESS NOTES
[facility] 79  [address] [facility], [address] [place] [address]  [phone]      Hospitalist  Progress Note      NAME:         Beau Shane   :        1965  MRM:        [id number]    Date of service: 3/15/2023      Subjective: Patient seen and examined by me. Patient admitted with abdominal  pain. No pain today. No fever or chills. No nausea or vomiting. Anxious about going home soon. Has been having Hives. Receiving Benadryl, changed cefepime to levaquin. Discussed with pharmacy, consutled ID.  Plan for scope in AM    Objective:    Vital Signs:    Visit Vitals  /68 (BP 1 Location: Left upper arm, BP Patient Position: Sitting)   Pulse 71   Temp 97.7 °F (36.5 °C)   Resp 16   Ht 5' 10\" (1.778 m)   Wt 81.2 kg (179 lb)   SpO2 100%   Breastfeeding No   BMI 25.68 kg/m²        Intake/Output Summary (Last 24 hours) at 3/15/2023 1510  Last data filed at 3/15/2023 1144  Gross per 24 hour   Intake 1250 ml   Output 751 ml   Net 499 ml          Current inpatient medications reviewed:  Current Facility-Administered Medications   Medication Dose Route Frequency    lidocaine (PF) (XYLOCAINE) 10 mg/mL (1 %) injection 0.1 mL  0.1 mL SubCUTAneous PRN    lactated Ringers infusion  125 mL/hr IntraVENous CONTINUOUS    sodium chloride (NS) flush 5-40 mL  5-40 mL IntraVENous Q8H    sodium chloride (NS) flush 5-40 mL  5-40 mL IntraVENous PRN    naloxone (NARCAN) injection 0.04 mg  0.04 mg IntraVENous Multiple    flumazeniL (ROMAZICON) 0.1 mg/mL injection 0.2 mg  0.2 mg IntraVENous Multiple    ferrous gluconate 324 mg (38 mg iron) tablet 1 Tablet  1 Tablet Oral DAILY WITH BREAKFAST    0.9% sodium chloride infusion  75 mL/hr IntraVENous CONTINUOUS    HYDROmorphone (DILAUDID) syringe 0.5 mg  0.5 mg IntraVENous Q4H PRN    levoFLOXacin (LEVAQUIN) 500 mg in D5W IVPB  500 mg IntraVENous Q24H    diphenhydrAMINE (BENADRYL) capsule 25 mg  25 mg Oral Q6H PRN    hydrocortisone (CORTAID) 1 % cream   Topical PRN    sodium chloride (NS) flush 5-40 mL  5-40 mL IntraVENous Q8H    sodium chloride (NS) flush 5-40 mL  5-40 mL IntraVENous PRN    acetaminophen (TYLENOL) tablet 650 mg  650 mg Oral Q6H PRN    Or    acetaminophen (TYLENOL) suppository 650 mg  650 mg Rectal Q6H PRN    polyethylene glycol (MIRALAX) packet 17 g  17 g Oral DAILY PRN    ondansetron (ZOFRAN ODT) tablet 4 mg  4 mg Oral Q8H PRN    Or    ondansetron (ZOFRAN) injection 4 mg  4 mg IntraVENous Q6H PRN    tamsulosin (FLOMAX) capsule 0.4 mg  0.4 mg Oral DAILY    vancomycin (VANCOCIN) 1,000 mg in 0.9% sodium chloride 250 mL ([id number])  1,000 mg IntraVENous Q12H       Physical Examination:    General:   Weak and ill looking but not in distress   Eyes:   pink conjunctivae, PERRLA with no discharge. ENT:   no ottorrhea or rhinorrhea with dry mucous membranes  Neck: no masses, thyroid non-tender and trachea central.  Pulm:  clear breath sounds without crackles or wheezes  Card:  has regular and normal S1, S2 without thrills, bruits or peripheral edema  Abd:  Soft, non-tender, non-distended, normoactive bowel sounds   Musc:  No cyanosis, clubbing, atrophy or deformities. Skin:  No rashes, bruising or ulcers. Neuro: Awake and alert.  Generally a non focal exam. Follows commands appropriately  Psych:  Has a good insight and is oriented x 3    Diagnostic testing:    Laboratory data reviewed and independently interpreted:    Recent Labs     03/15/23  0404 03/14/23  0924   WBC 9.3 5.3   HGB 10.7* 11.7   HCT 34.0* 37.3    280       Recent Labs     03/15/23  0404 03/14/23  0402 03/13/23  0407    141 138   K 4.0 3.6 3.5    111* 107   CO2 24 28 29   * 89 90   BUN 17 11 12   CREA 0.92 0.60 0.59   CA 8.6 8.9 8.7       No components found for: GLPOC    Cultures, Imaging studies reviewed and reports noted, Telemetry reviewed and independently interpreted by me - all reviewed in [facility] Notes reviewed: Consults, CM, therapies     Assessment and Plan:    Renal abscess, right (3/11/2023) POA: likely due to the obstructing ureteric stone. CT scan abdomen showed a right upper pole hypodense mass with a hyperdense rim measures 23 x 27 x 30 mm, and likely represents a renal abscess or less likely calyceal rupture. UA unremarkable. IV Cefepime changed to Levaquin 2/2 Hives (however believe was caused by Fentanyl). IV Vancomycin. CT repeat with increasing inflammation. Plan for cystoscopy in AM. ID consulted, will defer changes to them. In OR patient had gross purulence noted once stent placed. .     Obstruction of left ureteropelvic junction due to stone / Hydronephrosis with urinary obstruction due to renal calculus (3/12/2023) POA: CT noted an obstructing calculus in the right upper pole collecting system measures 6 mm. There is focal hydronephrosis of several upper pole calyces. Repeat CT 3/14  shows worsening inflammation. Continue to keep NPO. IV fluids. IV Dilaudid PRN for Hives and concern is cause of RXN. severe pain. Cystoscopy with high grade obstruction and gross purulence on stent placement. Continue Flomax    Essential hypertension (3/29/2016) POA: BPs low normal now. Hold all meds. Continue IV fluids and monitor    Hives: Likely due to Fentanyl, not cefepime    Weakness / Hx gastric bypass POA: check an iron profile, vitamin B12 levels    Anemia: Ferritin quite low at 20, with sat 9%, and iron 29. TIBC 309, will start Iron supplementation    Care Plan discussed with: Patient, Nursing. Prophylaxis:  SCD's                Anticipated Disposition:  Home w/Family    PCP:      Stefano Dodge MD     I have personally examined and treated the patient at bedside during this period.  To assist coordination of care and communication with nursing and staff, this note may be preliminary early in the day, but finalized by end of the day.         ___________________________________________________    Attending Physician:   Julianna Ozuna MD

## 2023-03-15 NOTE — ANESTHESIA POSTPROCEDURE EVALUATION
Procedure(s):  CYSTOSCOPY WITH RETROGRADES PYELOGRAM AND RIGHT URETERAL STENT PLACEMENT. general    Anesthesia Post Evaluation      Multimodal analgesia: multimodal analgesia not used between 6 hours prior to anesthesia start to PACU discharge  Patient location during evaluation: PACU  Patient participation: complete - patient participated  Level of consciousness: awake  Pain management: adequate  Airway patency: patent  Anesthetic complications: no  Cardiovascular status: acceptable, blood pressure returned to baseline and hemodynamically stable  Respiratory status: acceptable  Hydration status: acceptable  Post anesthesia nausea and vomiting:  controlled  Final Post Anesthesia Temperature Assessment:  Normothermia (36.0-37.5 degrees C)      INITIAL Post-op Vital signs:   Vitals Value Taken Time   /55 03/15/23 1122   Temp 36.9 °C (98.5 °F) 03/15/23 1026   Pulse 65 03/15/23 1126   Resp 13 03/15/23 1126   SpO2 100 % 03/15/23 1126   Vitals shown include unvalidated device data.

## 2023-03-15 NOTE — PROGRESS NOTES
Urology Progress Note    Patient: Glenn Blas MRN: [id number]  SSN: [id number]    YOB: 1965  Age: 65 y.o. Sex: female        Assessment:   Reports severe right flank pain overnight  Still w/o leukocytosis  Cr WNL   VSS, afebrile    Plan:     2cm right renal abscess, proximal right ureteral stone   -KUB reviewed with Dr. Phuong Clark, contrast from yesterday's CT still noted within right ureter, indicating obstruction. -Plans for cystoscopy, right rpg with right ureteral stent placement today at 9:15 am with Dr. Grace Wan.   - Abscess slightly decreased on 3/13/23 CT abd/pel , Continue broad spectrum abx.   - ID consult pending, appreciate abx reccs      Patient seen with Dr. Phuong Clark    Reason For Visit:     Follow up for 2cm right renal abscess, right nonobstructing stone     ROS:     Denies fevers  Denies abdominal pain  Reports right flank pain   Denies hematuria, frequency    Objective:     Visit Vitals  /74 (BP 1 Location: Right arm, BP Patient Position: At rest)   Pulse 85   Temp 98 °F (36.7 °C)   Resp 17   Ht 5' 10\" (1.778 m)   Wt 81.2 kg (179 lb)   SpO2 97%   Breastfeeding No   BMI 25.68 kg/m²       No intake or output data in the 24 hours ending 03/15/23 0747      Physical Exam  General: NAD, well appearing   Respiratory: no distress, room air  Abdomen: soft, no distention  : right CVA tenderness, voiding independently   Neuro: Appropriate, no focal neurological deficits    Labs reviewed, March 15, 2023  Recent Results (from the past 24 hour(s))   CBC WITH AUTOMATED DIFF    Collection Time: 03/14/23  9:24 AM   Result Value Ref Range    WBC 5.3 3.6 - 11.0 K/uL    RBC 4.11 3.80 - 5.20 M/uL    HGB 11.7 11.5 - 16.0 g/dL    HCT 37.3 35.0 - 47.0 %    MCV 90.8 80.0 - 99.0 FL    MCH 28.5 26.0 - 34.0 PG    MCHC 31.4 30.0 - 36.5 g/dL    RDW 13.8 11.5 - 14.5 %    PLATELET 338 082 - 952 K/uL    MPV 9.7 8.9 - 12.9 FL    NRBC 0.0 0  WBC    ABSOLUTE NRBC 0. 00 0.00 - 0.01 K/uL    NEUTROPHILS 73 32 - 75 %    LYMPHOCYTES 18 12 - 49 %    MONOCYTES 7 5 - 13 %    EOSINOPHILS 2 0 - 7 %    BASOPHILS 0 0 - 1 %    IMMATURE GRANULOCYTES 0 0.0 - 0.5 %    ABS. NEUTROPHILS 3.8 1.8 - 8.0 K/UL    ABS. LYMPHOCYTES 1.0 0.8 - 3.5 K/UL    ABS. MONOCYTES 0.4 0.0 - 1.0 K/UL    ABS. EOSINOPHILS 0.1 0.0 - 0.4 K/UL    ABS. BASOPHILS 0.0 0.0 - 0.1 K/UL    ABS. IMM. GRANS. 0.0 0.00 - 0.04 K/UL    DF AUTOMATED     CBC WITH AUTOMATED DIFF    Collection Time: 03/15/23  4:04 AM   Result Value Ref Range    WBC 9.3 3.6 - 11.0 K/uL    RBC 3.74 (L) 3.80 - 5.20 M/uL    HGB 10.7 (L) 11.5 - 16.0 g/dL    HCT 34.0 (L) 35.0 - 47.0 %    MCV 90.9 80.0 - 99.0 FL    MCH 28.6 26.0 - 34.0 PG    MCHC 31.5 30.0 - 36.5 g/dL    RDW 13.6 11.5 - 14.5 %    PLATELET 704 482 - 641 K/uL    MPV 10.0 8.9 - 12.9 FL    NRBC 0.0 0  WBC    ABSOLUTE NRBC 0.00 0.00 - 0.01 K/uL    NEUTROPHILS 84 (H) 32 - 75 %    LYMPHOCYTES 8 (L) 12 - 49 %    MONOCYTES 7 5 - 13 %    EOSINOPHILS 1 0 - 7 %    BASOPHILS 0 0 - 1 %    IMMATURE GRANULOCYTES 0 0.0 - 0.5 %    ABS. NEUTROPHILS 7.8 1.8 - 8.0 K/UL    ABS. LYMPHOCYTES 0.7 (L) 0.8 - 3.5 K/UL    ABS. MONOCYTES 0.7 0.0 - 1.0 K/UL    ABS. EOSINOPHILS 0.1 0.0 - 0.4 K/UL    ABS. BASOPHILS 0.0 0.0 - 0.1 K/UL    ABS. IMM. GRANS. 0.0 0.00 - 0.04 K/UL    DF SMEAR SCANNED      RBC COMMENTS NORMOCYTIC, NORMOCHROMIC     METABOLIC PANEL, BASIC    Collection Time: 03/15/23  4:04 AM   Result Value Ref Range    Sodium 137 136 - 145 mmol/L    Potassium 4.0 3.5 - 5.1 mmol/L    Chloride 108 97 - 108 mmol/L    CO2 24 21 - 32 mmol/L    Anion gap 5 5 - 15 mmol/L    Glucose 124 (H) 65 - 100 mg/dL    BUN 17 6 - 20 MG/DL    Creatinine 0.92 0.55 - 1.02 MG/DL    BUN/Creatinine ratio 18 12 - 20      eGFR >60 >60 ml/min/1.73m2    Calcium 8.6 8.5 - 10.1 MG/DL       Imaging:  CT Results  (Last 48 hours)                 03/14/23 1250  CT ABD PELV W WO CONT Final result    Impression:  1.   Right renal abscess is slightly decreased in size with decreased surrounding   inflammation. 2.  Right-sided stone has migrated into the proximal right ureter with mild   right-sided hydronephrosis now present. Narrative:  EXAM: CT ABD PELV W WO CONT       INDICATION: Reassess right renal abscess and stone       COMPARISON: 3/11/2023. IV CONTRAST: 100 mL of [id number]. ORAL CONTRAST: None       TECHNIQUE:     Multislice helical CT was performed from the diaphragm to the iliac crest prior   to intravenous contrast administration and from the diaphragm to the symphysis   pubis during uneventful rapid bolus intravenous contrast administration. Contiguous 5 mm axial images were reconstructed and lung and soft tissue windows   were generated. Coronal and sagittal reformations were generated. CT dose   reduction was achieved through use of a standardized protocol tailored for this   examination and automatic exposure control for dose modulation. FINDINGS:    LOWER THORAX: No significant abnormality in the incidentally imaged lower chest.   LIVER: Small hypodensity right hepatic lobe unchanged   BILIARY TREE: Cholecystectomy CBD is not dilated. SPLEEN: within normal limits. PANCREAS: No mass or ductal dilatation. ADRENALS: Unremarkable. KIDNEYS: No mass, calculus, or hydronephrosis. Right renal lesion slightly   decreased in size measuring 2.2 x 1.6 cm when measured in a similar fashion. Surrounding ill-defined hypodensity is again noted although improved. The right   renal stone has now migrated into the proximal right ureter with mild   right-sided hydronephrosis noted   STOMACH: Postsurgical changes   SMALL BOWEL: No dilatation or wall thickening. COLON: No dilatation or wall thickening. APPENDIX: Unremarkable   PERITONEUM: No ascites or pneumoperitoneum. RETROPERITONEUM: No lymphadenopathy or aortic aneurysm. REPRODUCTIVE ORGANS: Probable uterine fibroids.    URINARY BLADDER: No mass or calculus. BONES: No destructive bone lesion. ABDOMINAL WALL: No mass or hernia.    ADDITIONAL COMMENTS: N/A                   Signed By: NOVA Sanchez - March 15, 2023

## 2023-03-15 NOTE — PROGRESS NOTES
Problem: Pain  Goal: *Control of Pain  Outcome: Progressing Towards Goal  Goal: *PALLIATIVE CARE:  Alleviation of Pain  Outcome: Progressing Towards Goal     Problem: Patient Education: Go to Patient Education Activity  Goal: Patient/Family Education  Outcome: Progressing Towards Goal     Problem: Falls - Risk of  Goal: *Absence of Falls  Description: Document Junior Jansen Fall Risk and appropriate interventions in the flowsheet.   Outcome: Progressing Towards Goal  Note: Fall Risk Interventions:            Medication Interventions: Patient to call before getting OOB

## 2023-03-15 NOTE — PROGRESS NOTES
Bedside shift change report given to [place] (oncoming nurse) by Carter Tinoco (offgoing nurse). Report included the following information SBAR, Kardex, OR Summary, and MAR. unable

## 2023-03-15 NOTE — PROGRESS NOTES
Discussed placement of r ureteral stent w pt including risks of infection, bleeding, injury to collecting system and inability to get stent in. She understands stent is temporary and will need to be changed or removed in next 3 months. Also stent will not rx stone and stone rx will be needed in future when infection/abscess resolved. She agrees and wants to proceed.

## 2023-03-15 NOTE — BRIEF OP NOTE
Brief Postoperative Note    Patient: Shilpi Ospina  YOB: 1965  MRN: [id number]    Date of Procedure: 3/15/2023     Pre-Op Diagnosis: RENAL STONE    Post-Op Diagnosis: Same as preoperative diagnosis. Procedure(s):  CYSTOSCOPY WITH RETROGRADES PYELOGRAM AND RIGHT URETERAL STENT PLACEMENT    Surgeon(s):  Shawn Henderson MD    Surgical Assistant: None    Anesthesia: General     Estimated Blood Loss (mL): Minimal    Complications: None    Specimens:   ID Type Source Tests Collected by Time Destination   1 : Urine Urine Bladder CULTURE, URINE Shawn Henderson MD 3/15/2023 1011 Microbiology        Implants:   Implant Name Type Inv. Item Serial No.  Lot No. LRB No. Used Action   STENT URET FLX SFT [id number] -- PERCUFLEX - SNA  STENT URET FLX SFT [id number] -- PERCUFLEX NA [id number] 1 Implanted       Drains: * No LDAs found *    Findings: high grade prox ureteral obstr. Stented w 26cm 6 fr. Copious pus noted in kidney. Cx sent.       Electronically Signed by Tarik Rivero MD on 3/15/2023 at 10:15 AM

## 2023-03-15 NOTE — PROGRESS NOTES
Bedside shift change report given to Courtney Yang (oncoming nurse) by Andrae Monaco (offgoing nurse). Report included the following information SBAR, Intake/Output, MAR, Recent Results, and Med Rec Status.

## 2023-03-15 NOTE — ROUTINE PROCESS
TRANSFER - OUT REPORT:    Verbal report given to Lety Mcgraw LPN(name) on Flavio Pouch  being transferred to 4th floor(unit) for routine post - op       Report consisted of patients Situation, Background, Assessment and   Recommendations(SBAR). Information from the following report(s) SBAR, OR Summary, Intake/Output, and MAR was reviewed with the receiving nurse. Lines:   Peripheral IV 03/13/23 Anterior; Left Forearm (Active)   Site Assessment Clean, dry, & intact 03/15/23 1100   Phlebitis Assessment 0 03/15/23 1100   Infiltration Assessment 0 03/15/23 1100   Dressing Status Clean, dry, & intact 03/15/23 1025   Dressing Type Tape;Transparent 03/15/23 1100   Hub Color/Line Status Pink; Infusing 03/15/23 1100   Action Taken Open ports on tubing capped 03/15/23 0841   Alcohol Cap Used Yes 03/15/23 1100        Opportunity for questions and clarification was provided.       Patient transported with:   Registered Nurse  Tech

## 2023-03-15 NOTE — ANESTHESIA PREPROCEDURE EVALUATION
Relevant Problems   CARDIOVASCULAR   (+) Essential hypertension      GASTROINTESTINAL   (+) Gastroesophageal reflux disease without esophagitis      RENAL FAILURE   (+) Hydronephrosis with urinary obstruction due to renal calculus   (+) Nephrolithiasis   (+) Obstruction of left ureteropelvic junction due to stone   (+) Renal abscess, right       Anesthetic History   No history of anesthetic complications            Review of Systems / Medical History  Patient summary reviewed and pertinent labs reviewed    Pulmonary  Within defined limits                 Neuro/Psych   Within defined limits           Cardiovascular    Hypertension                   GI/Hepatic/Renal         Renal disease: stones      Comments: Pyelonephritis-with possible renal abscess Endo/Other  Within defined limits           Other Findings              Physical Exam    Airway  Mallampati: II    Neck ROM: normal range of motion   Mouth opening: Normal     Cardiovascular    Rhythm: regular  Rate: normal         Dental    Dentition: Full upper dentures     Pulmonary  Breath sounds clear to auscultation               Abdominal  GI exam deferred       Other Findings            Anesthetic Plan    ASA: 2  Anesthesia type: general  LMA        Induction: Intravenous  Anesthetic plan and risks discussed with: Patient

## 2023-03-15 NOTE — OP NOTES
[facility]  OPERATIVE REPORT    Name:  Venkata Castaneda  MR#:  [id number]  :  1965  ACCOUNT #:  [de-identified]  DATE OF SERVICE:  03/15/2023    LOCATION:  [facility]. PREOPERATIVE DIAGNOSES:  Left renal abscess, proximal left ureteral stone with high-grade obstruction. POSTOPERATIVE DIAGNOSES:  Left renal abscess, proximal left ureteral stone with high-grade obstruction. PROCEDURE PERFORMED:  ***. SURGEON:  Raj Vallecillo MD    ASSISTANT:  None. ANESTHESIA:  General.    COMPLICATIONS:  None. SPECIMENS REMOVED:  Urine culture from bladder post stenting. IMPLANTS:  None. ESTIMATED BLOOD LOSS:  Minimal.    PROCEDURE:  After anesthesia, the patient was prepped and draped in lithotomy position. A 21 cystoscope was inserted. The patient was noted to have cystocele. Ureteral orifices were normally positioned. The bladder was unremarkable. Using an open-ended catheter, the right ureteral orifice was cannulated and contrast was injected filling the ureter. A filling defect noted in the proximal ureter. A slippery wire was passed through the area of obstruction and curled in the dilated collecting system. Purulent material was noted to exit alongside the wire after it bypassed the obstruction. A 26 cm 6-Kyrgyz double-J stent was positioned between the renal pelvis and the bladder. Urine from the bladder was sent for culture and the bladder was drained and instruments were removed. The patient was reacted from anesthesia and transferred to recovery room in stable condition.       MD ISABELL [id number]  D:  03/15/2023 10:19  T:  03/15/2023 14:53  JOB #:  [id number]  CC:  [address] [place] [address]

## 2023-03-16 LAB
ANION GAP SERPL CALC-SCNC: 4 MMOL/L (ref 5–15)
BACTERIA SPEC CULT: NORMAL
BASOPHILS # BLD: 0 K/UL (ref 0–0.1)
BASOPHILS NFR BLD: 0 % (ref 0–1)
BUN SERPL-MCNC: 16 MG/DL (ref 6–20)
BUN/CREAT SERPL: 25 (ref 12–20)
CALCIUM SERPL-MCNC: 8.4 MG/DL (ref 8.5–10.1)
CHLORIDE SERPL-SCNC: 110 MMOL/L (ref 97–108)
CO2 SERPL-SCNC: 26 MMOL/L (ref 21–32)
CREAT SERPL-MCNC: 0.64 MG/DL (ref 0.55–1.02)
DIFFERENTIAL METHOD BLD: ABNORMAL
EOSINOPHIL # BLD: 0.1 K/UL (ref 0–0.4)
EOSINOPHIL NFR BLD: 1 % (ref 0–7)
ERYTHROCYTE [DISTWIDTH] IN BLOOD BY AUTOMATED COUNT: 13.9 % (ref 11.5–14.5)
GLUCOSE SERPL-MCNC: 108 MG/DL (ref 65–100)
HCT VFR BLD AUTO: 31 % (ref 35–47)
HGB BLD-MCNC: 9.8 G/DL (ref 11.5–16)
IMM GRANULOCYTES # BLD AUTO: 0 K/UL (ref 0–0.04)
IMM GRANULOCYTES NFR BLD AUTO: 0 % (ref 0–0.5)
LYMPHOCYTES # BLD: 1.2 K/UL (ref 0.8–3.5)
LYMPHOCYTES NFR BLD: 14 % (ref 12–49)
MCH RBC QN AUTO: 28.4 PG (ref 26–34)
MCHC RBC AUTO-ENTMCNC: 31.6 G/DL (ref 30–36.5)
MCV RBC AUTO: 89.9 FL (ref 80–99)
MONOCYTES # BLD: 0.7 K/UL (ref 0–1)
MONOCYTES NFR BLD: 8 % (ref 5–13)
NEUTS SEG # BLD: 6.5 K/UL (ref 1.8–8)
NEUTS SEG NFR BLD: 77 % (ref 32–75)
NRBC # BLD: 0 K/UL (ref 0–0.01)
NRBC BLD-RTO: 0 PER 100 WBC
PLATELET # BLD AUTO: 256 K/UL (ref 150–400)
PMV BLD AUTO: 10 FL (ref 8.9–12.9)
POTASSIUM SERPL-SCNC: 3.8 MMOL/L (ref 3.5–5.1)
RBC # BLD AUTO: 3.45 M/UL (ref 3.8–5.2)
SERVICE CMNT-IMP: NORMAL
SODIUM SERPL-SCNC: 140 MMOL/L (ref 136–145)
WBC # BLD AUTO: 8.5 K/UL (ref 3.6–11)

## 2023-03-16 PROCEDURE — 74011250636 HC RX REV CODE- 250/636: Performed by: UROLOGY

## 2023-03-16 PROCEDURE — 74011000250 HC RX REV CODE- 250: Performed by: UROLOGY

## 2023-03-16 PROCEDURE — 99223 1ST HOSP IP/OBS HIGH 75: CPT | Performed by: INTERNAL MEDICINE

## 2023-03-16 PROCEDURE — 74011250637 HC RX REV CODE- 250/637: Performed by: UROLOGY

## 2023-03-16 PROCEDURE — 85025 COMPLETE CBC W/AUTO DIFF WBC: CPT

## 2023-03-16 PROCEDURE — 80048 BASIC METABOLIC PNL TOTAL CA: CPT

## 2023-03-16 PROCEDURE — 65270000029 HC RM PRIVATE

## 2023-03-16 PROCEDURE — 36415 COLL VENOUS BLD VENIPUNCTURE: CPT

## 2023-03-16 RX ORDER — PHENAZOPYRIDINE HYDROCHLORIDE 100 MG/1
200 TABLET, FILM COATED ORAL
Status: DISCONTINUED | OUTPATIENT
Start: 2023-03-16 | End: 2023-03-17 | Stop reason: HOSPADM

## 2023-03-16 RX ADMIN — LEVOFLOXACIN 500 MG: 5 INJECTION, SOLUTION INTRAVENOUS at 18:59

## 2023-03-16 RX ADMIN — VANCOMYCIN HYDROCHLORIDE 1000 MG: 1 INJECTION, POWDER, LYOPHILIZED, FOR SOLUTION INTRAVENOUS at 23:35

## 2023-03-16 RX ADMIN — DIPHENHYDRAMINE HYDROCHLORIDE 25 MG: 25 CAPSULE ORAL at 23:35

## 2023-03-16 RX ADMIN — TAMSULOSIN HYDROCHLORIDE 0.4 MG: 0.4 CAPSULE ORAL at 08:55

## 2023-03-16 RX ADMIN — SODIUM CHLORIDE, PRESERVATIVE FREE 10 ML: 5 INJECTION INTRAVENOUS at 18:59

## 2023-03-16 RX ADMIN — VANCOMYCIN HYDROCHLORIDE 1000 MG: 1 INJECTION, POWDER, LYOPHILIZED, FOR SOLUTION INTRAVENOUS at 08:55

## 2023-03-16 RX ADMIN — FERROUS GLUCONATE 1 TABLET: 324 TABLET ORAL at 08:55

## 2023-03-16 RX ADMIN — SODIUM CHLORIDE, PRESERVATIVE FREE 10 ML: 5 INJECTION INTRAVENOUS at 23:19

## 2023-03-16 NOTE — PROGRESS NOTES
3/16/2023  3:35 PM  Care Management Progress Note      ICD-10-CM ICD-9-CM    1. Nephrolithiasis  N20.0 592.0       2. Renal abscess  N15.1 590.2       3. Bacteriuria  R82.71 791.9       4. Hydronephrosis, unspecified hydronephrosis type  N13.30 591           RUR:  4%  Risk Level: [x]Low []Moderate []High  Value-based purchasing: [x] Yes [] No  Bundle patient: [] Yes [x] No   Specify:     Transition of care plan:  Awaiting medical clearance and DC order. Urology following. Cultures pending. TBD pending cultures (IV abx vs PO). Outpatient follow-up. Pt's family to transport.

## 2023-03-16 NOTE — PROGRESS NOTES
Problem: Pain  Goal: *Control of Pain  Outcome: Progressing Towards Goal  Goal: *PALLIATIVE CARE:  Alleviation of Pain  Outcome: Progressing Towards Goal     Problem: Patient Education: Go to Patient Education Activity  Goal: Patient/Family Education  Outcome: Progressing Towards Goal     Problem: Falls - Risk of  Goal: *Absence of Falls  Description: Document Tudoni Jammie Fall Risk and appropriate interventions in the flowsheet.   Outcome: Progressing Towards Goal  Note: Fall Risk Interventions:            Medication Interventions: Patient to call before getting OOB                   Problem: Patient Education: Go to Patient Education Activity  Goal: Patient/Family Education  Outcome: Progressing Towards Goal

## 2023-03-16 NOTE — CONSULTS
Infectious Disease Consult    Impression/Plan   Right renal abscess. Too small to drain per interventional radiology. Hopefully urine culture taken at the time of stent placement will reveal culprit organism. Recommend keeping patient hospitalized through tomorrow. If no growth from surgical culture we will consider discharge home with PICC line and IV antibiotics. Right-sided hydronephrosis due to obstructing stone. Status post cystoscopy with stent placement 3/15. Patient will need definitive stone treatment once infection has cleared  Hives. Thought to be due to fentanyl. Currently on levofloxacin and vancomycin    Anti-infectives:   Levofloxacin  Vancomycin    Subjective:   Date of Consultation:  March 16, 2023  Date of Admission: 3/11/2023   Referring Physician:     Patient is a 65 y.o. female with a past medical history significant for hypertension, gastroesophageal reflux disease, and anemia who is being seen for renal abscess. Patient was admitted [place] [address] on 3/11/2023 with complaints of severe right flank pain which began the night prior to admission. Patient also complained of dysuria. No fever or chills. CT scan at the time of admission revealed a 6 mm stone, hydronephrosis, and renal abscess. The abscess was felt to be too small to drain by IR. Repeat CT scan was done which revealed migration of the right stone into the proximal ureter and patient was taken to the OR on 3/15 where she underwent cystoscopy with stent placement. Purulent material was encountered during stent placement which was cultured. Culture is pending at the time of dictation. Patient currently on vancomycin and levofloxacin. Of note patient has had hives on several occasions during this admission. Concerned was raised for reaction to cefepime however the hospitalist feels this more likely due to fentanyl. Cefepime was stopped.     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    Uterine leiomyoma D25.9    Weight gain, abnormal R63.5    Gastroesophageal reflux disease without esophagitis K21.9    Renal abscess, right N15.1    Nephrolithiasis N20.0    Obstruction of left ureteropelvic junction due to stone N20.1    Hydronephrosis with urinary obstruction due to renal calculus N13.2     Past Medical History:   Diagnosis Date    Anemia     Hypertension     Menorrhagia       Family History   Problem Relation Age of Onset    Hypertension Mother     Diabetes Mother     Cancer Mother         cervical    Heart Disease Mother     Prostate Cancer Father     Hypertension Father     Breast Cancer Paternal Aunt     Uterine Cancer Sister     Diabetes Sister       Social History     Tobacco Use    Smoking status: Never    Smokeless tobacco: Never   Substance Use Topics    Alcohol use: No     Past Surgical History:   Procedure Laterality Date    HX BUNIONECTOMY Bilateral     HX  SECTION      HX GASTRIC BYPASS        Prior to Admission medications    Medication Sig Start Date End Date Taking? Authorizing Provider   triamterene-hydroCHLOROthiazide (MAXZIDE) 75-50 mg per tablet Take 1 Tablet by mouth daily. 22  Yes Kat Shetty MD   [facility]) 13.9 % topical cream Apply  to affected area two (2) times a day. apply thin layer to affected areas space application by 8 hour  Patient not taking: Reported on 3/15/2023 5/19/22   Brunilda Dukes MD   clobetasoL (TEMOVATE) 0.05 % external solution Apply to scalp once daily  Patient not taking: Reported on 3/15/2023 3/21/22   Kat Shetty MD     Allergies   Allergen Reactions    Fentanyl Citrate (Pf) Itching        Review of Systems:  A comprehensive review of systems was negative except for that written in the History of Present Illness. Objective:   Blood pressure 100/63, pulse 64, temperature 97.9 °F (36.6 °C), resp.  rate 16, height 5' 10\" (1.778 m), weight 179 lb (81.2 kg), SpO2 98 %, not currently breastfeeding. Temp (24hrs), Av °F (36.7 °C), Min:97.6 °F (36.4 °C), Max:98.5 °F (36.9 °C)       Exam:    General:  Alert, cooperative, well noursished, well developed, appears stated age   Eyes:  Sclera anicteric. Mouth/Throat: Mucous membranes normal   Neck: Supple   Lungs:   Clear to auscultation bilaterally   CV:  Regular rate and rhythm   Abdomen:   No CVA tenderness to percussion   Extremities: No edema   Skin: No acute rash on exposed skin   Lymph nodes:    Musculoskeletal: Moves all   Lines/Devices:  Peripheral   Psych: Alert and oriented, normal mood affect given the setting       Data Review:   Recent Results (from the past 24 hour(s))   METABOLIC PANEL, BASIC    Collection Time: 23  4:45 AM   Result Value Ref Range    Sodium 140 136 - 145 mmol/L    Potassium 3.8 3.5 - 5.1 mmol/L    Chloride 110 (H) 97 - 108 mmol/L    CO2 26 21 - 32 mmol/L    Anion gap 4 (L) 5 - 15 mmol/L    Glucose 108 (H) 65 - 100 mg/dL    BUN 16 6 - 20 MG/DL    Creatinine 0.64 0.55 - 1.02 MG/DL    BUN/Creatinine ratio 25 (H) 12 - 20      eGFR >60 >60 ml/min/1.73m2    Calcium 8.4 (L) 8.5 - 10.1 MG/DL   CBC WITH AUTOMATED DIFF    Collection Time: 23  4:45 AM   Result Value Ref Range    WBC 8.5 3.6 - 11.0 K/uL    RBC 3.45 (L) 3.80 - 5.20 M/uL    HGB 9.8 (L) 11.5 - 16.0 g/dL    HCT 31.0 (L) 35.0 - 47.0 %    MCV 89.9 80.0 - 99.0 FL    MCH 28.4 26.0 - 34.0 PG    MCHC 31.6 30.0 - 36.5 g/dL    RDW 13.9 11.5 - 14.5 %    PLATELET 592 700 - 534 K/uL    MPV 10.0 8.9 - 12.9 FL    NRBC 0.0 0  WBC    ABSOLUTE NRBC 0.00 0.00 - 0.01 K/uL    NEUTROPHILS 77 (H) 32 - 75 %    LYMPHOCYTES 14 12 - 49 %    MONOCYTES 8 5 - 13 %    EOSINOPHILS 1 0 - 7 %    BASOPHILS 0 0 - 1 %    IMMATURE GRANULOCYTES 0 0.0 - 0.5 %    ABS. NEUTROPHILS 6.5 1.8 - 8.0 K/UL    ABS. LYMPHOCYTES 1.2 0.8 - 3.5 K/UL    ABS. MONOCYTES 0.7 0.0 - 1.0 K/UL    ABS. EOSINOPHILS 0.1 0.0 - 0.4 K/UL    ABS. BASOPHILS 0.0 0.0 - 0.1 K/UL    ABS. IMM.  GRANS. 0.0 0.00 - 0.04 K/UL    DF AUTOMATED          Microbiology:      Studies:      Signed By: Prosper Disla DO     March 16, 2023

## 2023-03-16 NOTE — PROGRESS NOTES
[facility]  [facility] [facility], [address] [place] [address]  [phone]      Hospitalist  Progress Note      NAME:         Mirtha Reyes   :        1965  MRM:        [id number]    Date of service: 3/16/2023      Subjective: Patient seen and examined by me. Patient admitted with abdominal  pain. No pain today. No fever or chills. No nausea or vomiting. Anxious about going home soon. S/P Scope, gross purulence after stent placed. Awaiting Cx. ID following - discussed    Objective:    Vital Signs:    Visit Vitals  /66 (BP 1 Location: Right upper arm, BP Patient Position: Sitting; At rest)   Pulse 78   Temp 98 °F (36.7 °C)   Resp 18   Ht 5' 10\" (1.778 m)   Wt 81.2 kg (179 lb)   SpO2 99%   Breastfeeding No   BMI 25.68 kg/m²        Intake/Output Summary (Last 24 hours) at 3/16/2023 1618  Last data filed at 3/16/2023 0554  Gross per 24 hour   Intake 1771.25 ml   Output 900 ml   Net 871.25 ml          Current inpatient medications reviewed:  Current Facility-Administered Medications   Medication Dose Route Frequency    phenazopyridine (PYRIDIUM) tablet 200 mg  200 mg Oral TID PRN    ferrous gluconate 324 mg (38 mg iron) tablet 1 Tablet  1 Tablet Oral DAILY WITH BREAKFAST    0.9% sodium chloride infusion  75 mL/hr IntraVENous CONTINUOUS    HYDROmorphone (DILAUDID) syringe 0.5 mg  0.5 mg IntraVENous Q4H PRN    levoFLOXacin (LEVAQUIN) 500 mg in D5W IVPB  500 mg IntraVENous Q24H    diphenhydrAMINE (BENADRYL) capsule 25 mg  25 mg Oral Q6H PRN    hydrocortisone (CORTAID) 1 % cream   Topical PRN    sodium chloride (NS) flush 5-40 mL  5-40 mL IntraVENous Q8H    sodium chloride (NS) flush 5-40 mL  5-40 mL IntraVENous PRN    acetaminophen (TYLENOL) tablet 650 mg  650 mg Oral Q6H PRN    Or    acetaminophen (TYLENOL) suppository 650 mg  650 mg Rectal Q6H PRN    polyethylene glycol (MIRALAX) packet 17 g  17 g Oral DAILY PRN ondansetron (ZOFRAN ODT) tablet 4 mg  4 mg Oral Q8H PRN    Or    ondansetron (ZOFRAN) injection 4 mg  4 mg IntraVENous Q6H PRN    tamsulosin (FLOMAX) capsule 0.4 mg  0.4 mg Oral DAILY    vancomycin (VANCOCIN) 1,000 mg in 0.9% sodium chloride 250 mL ([id number])  1,000 mg IntraVENous Q12H       Physical Examination:    General:   Weak and ill looking but not in distress   Eyes:   pink conjunctivae, PERRLA with no discharge. ENT:   no ottorrhea or rhinorrhea with dry mucous membranes  Neck: no masses, thyroid non-tender and trachea central.  Pulm:  clear breath sounds without crackles or wheezes  Card:  has regular and normal S1, S2 without thrills, bruits or peripheral edema  Abd:  Soft, non-tender, non-distended, normoactive bowel sounds   Musc:  No cyanosis, clubbing, atrophy or deformities. Skin:  No rashes, bruising or ulcers. Neuro: Awake and alert. Generally a non focal exam. Follows commands appropriately  Psych:  Has a good insight and is oriented x 3    Diagnostic testing:    Laboratory data reviewed and independently interpreted:    Recent Labs     03/16/23  0445 03/15/23  0404 03/14/23  0924   WBC 8.5 9.3 5.3   HGB 9.8* 10.7* 11.7   HCT 31.0* 34.0* 37.3    250 280       Recent Labs     03/16/23  0445 03/15/23  0404 03/14/23  0402    137 141   K 3.8 4.0 3.6   * 108 111*   CO2 26 24 28   * 124* 89   BUN 16 17 11   CREA 0.64 0.92 0.60   CA 8.4* 8.6 8.9       No components found for: GLPOC    Cultures, Imaging studies reviewed and reports noted, Telemetry reviewed and independently interpreted by me - all reviewed in [facility]     Notes reviewed: Consults, CM, therapies     Assessment and Plan:    Renal abscess, right (3/11/2023) POA: likely due to the obstructing ureteric stone. CT scan abdomen showed a right upper pole hypodense mass with a hyperdense rim measures 23 x 27 x 30 mm, and likely represents a renal abscess or less likely calyceal rupture. UA unremarkable.   IV Cefepime changed to Levaquin 2/2 Hives (however believe was caused by Fentanyl). IV Vancomycin. CT repeat with increasing inflammation. Plan for cystoscopy in AM. ID consulted, will defer changes to them. In OR patient had gross purulence noted once stent placed. . Awaiting Cx data    Obstruction of left ureteropelvic junction due to stone / Hydronephrosis with urinary obstruction due to renal calculus (3/12/2023) POA: CT noted an obstructing calculus in the right upper pole collecting system measures 6 mm. There is focal hydronephrosis of several upper pole calyces. Repeat CT 3/14  shows worsening inflammation. Continue to keep NPO. IV fluids. IV Dilaudid PRN for Hives and concern is cause of RXN. severe pain. Cystoscopy with high grade obstruction and gross purulence on stent placement. Continue Flomax    Essential hypertension (3/29/2016) POA: BPs low normal now. Hold all meds. Continue IV fluids and monitor    Hives: Likely due to Fentanyl, not cefepime    Weakness / Hx gastric bypass POA: check an iron profile, vitamin B12 levels    Anemia: Ferritin quite low at 20, with sat 9%, and iron 29. TIBC 309, will start Iron supplementation    Care Plan discussed with: Patient, Nursing. Prophylaxis:  SCD's                Anticipated Disposition:  Home w/Family    PCP:      Andrea Saba MD     I have personally examined and treated the patient at bedside during this period.  To assist coordination of care and communication with nursing and staff, this note may be preliminary early in the day, but finalized by end of the day.         ___________________________________________________    Attending Physician:   Valentin Lee MD

## 2023-03-16 NOTE — PROGRESS NOTES
Problem: Pain  Goal: *Control of Pain  Outcome: Progressing Towards Goal  Goal: *PALLIATIVE CARE:  Alleviation of Pain  Outcome: Progressing Towards Goal     Problem: Patient Education: Go to Patient Education Activity  Goal: Patient/Family Education  Outcome: Progressing Towards Goal     Problem: Falls - Risk of  Goal: *Absence of Falls  Description: Document Kel Goodwin Fall Risk and appropriate interventions in the flowsheet.   Outcome: Progressing Towards Goal  Note: Fall Risk Interventions:            Medication Interventions: Patient to call before getting OOB                   Problem: Patient Education: Go to Patient Education Activity  Goal: Patient/Family Education  Outcome: Progressing Towards Goal

## 2023-03-16 NOTE — PROGRESS NOTES
Urology Progress Note    Subjective:     Daily Progress Note: 3/16/2023 9:26 AM    Juventino Weeks is 1 Day Post-Op and doing excellent. She reports pain is well controlled. She is tolerating a solid diet and ambulating without assistance. Voiding with dysuria sp stent placement, some feelings of bladder discomfort. Discussed this can be normal with a ureteral stent in place- will order supportive medication. Objective:     Visit Vitals  /63 (BP 1 Location: Left upper arm, BP Patient Position: At rest)   Pulse 64   Temp 97.9 °F (36.6 °C)   Resp 16   Ht 5' 10\" (1.778 m)   Wt 81.2 kg (179 lb)   SpO2 98%   Breastfeeding No   BMI 25.68 kg/m²        Temp (24hrs), Av °F (36.7 °C), Min:97.6 °F (36.4 °C), Max:98.5 °F (36.9 °C)      Intake and Output:   1901 -  0700  In: 3021.3 [I.V.:3021.3]  Out: 1651 [Urine:1650]  No intake/output data recorded.     Physical Exam:  General appearance: alert, cooperative, no distress, appears stated age  Respiratory: no distress, room air   Abdomen: not distended, soft   : voiding independently   Extremities: moves all       Data Review:    Recent Results (from the past 24 hour(s))   METABOLIC PANEL, BASIC    Collection Time: 23  4:45 AM   Result Value Ref Range    Sodium 140 136 - 145 mmol/L    Potassium 3.8 3.5 - 5.1 mmol/L    Chloride 110 (H) 97 - 108 mmol/L    CO2 26 21 - 32 mmol/L    Anion gap 4 (L) 5 - 15 mmol/L    Glucose 108 (H) 65 - 100 mg/dL    BUN 16 6 - 20 MG/DL    Creatinine 0.64 0.55 - 1.02 MG/DL    BUN/Creatinine ratio 25 (H) 12 - 20      eGFR >60 >60 ml/min/1.73m2    Calcium 8.4 (L) 8.5 - 10.1 MG/DL   CBC WITH AUTOMATED DIFF    Collection Time: 23  4:45 AM   Result Value Ref Range    WBC 8.5 3.6 - 11.0 K/uL    RBC 3.45 (L) 3.80 - 5.20 M/uL    HGB 9.8 (L) 11.5 - 16.0 g/dL    HCT 31.0 (L) 35.0 - 47.0 %    MCV 89.9 80.0 - 99.0 FL    MCH 28.4 26.0 - 34.0 PG    MCHC 31.6 30.0 - 36.5 g/dL    RDW 13.9 11.5 - 14.5 %    PLATELET 235 957 - 658 K/uL MPV 10.0 8.9 - 12.9 FL    NRBC 0.0 0  WBC    ABSOLUTE NRBC 0.00 0.00 - 0.01 K/uL    NEUTROPHILS 77 (H) 32 - 75 %    LYMPHOCYTES 14 12 - 49 %    MONOCYTES 8 5 - 13 %    EOSINOPHILS 1 0 - 7 %    BASOPHILS 0 0 - 1 %    IMMATURE GRANULOCYTES 0 0.0 - 0.5 %    ABS. NEUTROPHILS 6.5 1.8 - 8.0 K/UL    ABS. LYMPHOCYTES 1.2 0.8 - 3.5 K/UL    ABS. MONOCYTES 0.7 0.0 - 1.0 K/UL    ABS. EOSINOPHILS 0.1 0.0 - 0.4 K/UL    ABS. BASOPHILS 0.0 0.0 - 0.1 K/UL    ABS. IMM. GRANS. 0.0 0.00 - 0.04 K/UL    DF AUTOMATED         Assessment/Plan:     Principal Problem:    Renal abscess, right (3/11/2023)    Active Problems:    Essential hypertension (3/29/2016)      Nephrolithiasis (3/11/2023)      Obstruction of left ureteropelvic junction due to stone (3/12/2023)      Hydronephrosis with urinary obstruction due to renal calculus (3/12/2023)        Status Post:  Procedure(s):  CYSTOSCOPY WITH RETROGRADES PYELOGRAM AND RIGHT URETERAL STENT PLACEMENT     Plan:   2cm right renal abscess, right sided obstructing stone   -Case discussed with IR 3/12- abscess too small to drain. CT abd/pelv WWO repeated 3/14. Discussed with IR- slight improvement in abscess size, no plans for drainage. Right nonobstructing stone became obstructing on repeat scan.   -She is currently sp cystoscopy, right RPG, right ureteral stent insertion 3/15. Purulence noted from kidney in OR- repeat culture sent. Initial UCX was negative. She has remained without leukocytosis, afebrile, and generally doing well overall despite the purulence noted in her kidney/renal abscess. -ID consult pending for recs on abx and duration. Uncertain if they will recommend dispo today or once repeat UCX results?  Recs appreciated   -Definitive stone management at a later date once infection clears   -Continue flomax for stent discomfort, add pyridium for dysuria     Seen with Dr. Laverne Jenkins     Signed By: Jyotsna Varela NP                         March 16, 2023

## 2023-03-17 ENCOUNTER — APPOINTMENT (OUTPATIENT)
Dept: GENERAL RADIOLOGY | Age: 58
End: 2023-03-17
Payer: COMMERCIAL

## 2023-03-17 VITALS
BODY MASS INDEX: 25.62 KG/M2 | RESPIRATION RATE: 20 BRPM | SYSTOLIC BLOOD PRESSURE: 103 MMHG | DIASTOLIC BLOOD PRESSURE: 66 MMHG | HEART RATE: 64 BPM | OXYGEN SATURATION: 99 % | WEIGHT: 179 LBS | TEMPERATURE: 97.7 F | HEIGHT: 70 IN

## 2023-03-17 LAB
ANION GAP SERPL CALC-SCNC: 2 MMOL/L (ref 5–15)
BASOPHILS # BLD: 0 K/UL (ref 0–0.1)
BASOPHILS NFR BLD: 1 % (ref 0–1)
BUN SERPL-MCNC: 12 MG/DL (ref 6–20)
BUN/CREAT SERPL: 19 (ref 12–20)
CALCIUM SERPL-MCNC: 8.5 MG/DL (ref 8.5–10.1)
CHLORIDE SERPL-SCNC: 110 MMOL/L (ref 97–108)
CO2 SERPL-SCNC: 28 MMOL/L (ref 21–32)
CREAT SERPL-MCNC: 0.62 MG/DL (ref 0.55–1.02)
DIFFERENTIAL METHOD BLD: ABNORMAL
EOSINOPHIL # BLD: 0.2 K/UL (ref 0–0.4)
EOSINOPHIL NFR BLD: 3 % (ref 0–7)
ERYTHROCYTE [DISTWIDTH] IN BLOOD BY AUTOMATED COUNT: 13.9 % (ref 11.5–14.5)
GLUCOSE SERPL-MCNC: 90 MG/DL (ref 65–100)
HCT VFR BLD AUTO: 33.1 % (ref 35–47)
HGB BLD-MCNC: 10.4 G/DL (ref 11.5–16)
IMM GRANULOCYTES # BLD AUTO: 0 K/UL (ref 0–0.04)
IMM GRANULOCYTES NFR BLD AUTO: 0 % (ref 0–0.5)
LYMPHOCYTES # BLD: 1.6 K/UL (ref 0.8–3.5)
LYMPHOCYTES NFR BLD: 27 % (ref 12–49)
MCH RBC QN AUTO: 28.3 PG (ref 26–34)
MCHC RBC AUTO-ENTMCNC: 31.4 G/DL (ref 30–36.5)
MCV RBC AUTO: 90.2 FL (ref 80–99)
MONOCYTES # BLD: 0.4 K/UL (ref 0–1)
MONOCYTES NFR BLD: 6 % (ref 5–13)
NEUTS SEG # BLD: 3.9 K/UL (ref 1.8–8)
NEUTS SEG NFR BLD: 63 % (ref 32–75)
NRBC # BLD: 0 K/UL (ref 0–0.01)
NRBC BLD-RTO: 0 PER 100 WBC
PLATELET # BLD AUTO: 250 K/UL (ref 150–400)
PMV BLD AUTO: 10.2 FL (ref 8.9–12.9)
POTASSIUM SERPL-SCNC: 3.7 MMOL/L (ref 3.5–5.1)
RBC # BLD AUTO: 3.67 M/UL (ref 3.8–5.2)
SODIUM SERPL-SCNC: 140 MMOL/L (ref 136–145)
WBC # BLD AUTO: 6 K/UL (ref 3.6–11)

## 2023-03-17 PROCEDURE — 74011250636 HC RX REV CODE- 250/636: Performed by: UROLOGY

## 2023-03-17 PROCEDURE — 99239 HOSP IP/OBS DSCHRG MGMT >30: CPT | Performed by: FAMILY MEDICINE

## 2023-03-17 PROCEDURE — 99232 SBSQ HOSP IP/OBS MODERATE 35: CPT | Performed by: INTERNAL MEDICINE

## 2023-03-17 PROCEDURE — 76937 US GUIDE VASCULAR ACCESS: CPT

## 2023-03-17 PROCEDURE — 74011250636 HC RX REV CODE- 250/636: Performed by: INTERNAL MEDICINE

## 2023-03-17 PROCEDURE — 74011000250 HC RX REV CODE- 250: Performed by: UROLOGY

## 2023-03-17 PROCEDURE — 85025 COMPLETE CBC W/AUTO DIFF WBC: CPT

## 2023-03-17 PROCEDURE — 2709999900 HC NON-CHARGEABLE SUPPLY

## 2023-03-17 PROCEDURE — 74011250637 HC RX REV CODE- 250/637: Performed by: UROLOGY

## 2023-03-17 PROCEDURE — 80048 BASIC METABOLIC PNL TOTAL CA: CPT

## 2023-03-17 PROCEDURE — 36415 COLL VENOUS BLD VENIPUNCTURE: CPT

## 2023-03-17 PROCEDURE — C1751 CATH, INF, PER/CENT/MIDLINE: HCPCS

## 2023-03-17 PROCEDURE — 36573 INSJ PICC RS&I 5 YR+: CPT

## 2023-03-17 PROCEDURE — 74011000250 HC RX REV CODE- 250: Performed by: INTERNAL MEDICINE

## 2023-03-17 PROCEDURE — 77030018786 HC NDL GD F/USND BARD -B

## 2023-03-17 RX ORDER — TAMSULOSIN HYDROCHLORIDE 0.4 MG/1
0.4 CAPSULE ORAL DAILY
Qty: 30 CAPSULE | Refills: 0 | Status: SHIPPED | OUTPATIENT
Start: 2023-03-18 | End: 2023-04-17

## 2023-03-17 RX ADMIN — TAMSULOSIN HYDROCHLORIDE 0.4 MG: 0.4 CAPSULE ORAL at 08:40

## 2023-03-17 RX ADMIN — CEFTRIAXONE SODIUM 2 G: 2 INJECTION, POWDER, FOR SOLUTION INTRAMUSCULAR; INTRAVENOUS at 12:04

## 2023-03-17 RX ADMIN — VANCOMYCIN HYDROCHLORIDE 1000 MG: 1 INJECTION, POWDER, LYOPHILIZED, FOR SOLUTION INTRAVENOUS at 08:40

## 2023-03-17 RX ADMIN — HYDROMORPHONE HYDROCHLORIDE 0.5 MG: 1 INJECTION, SOLUTION INTRAMUSCULAR; INTRAVENOUS; SUBCUTANEOUS at 02:37

## 2023-03-17 RX ADMIN — SODIUM CHLORIDE, PRESERVATIVE FREE 10 ML: 5 INJECTION INTRAVENOUS at 02:38

## 2023-03-17 RX ADMIN — FERROUS GLUCONATE 1 TABLET: 324 TABLET ORAL at 08:40

## 2023-03-17 RX ADMIN — SODIUM CHLORIDE, PRESERVATIVE FREE 10 ML: 5 INJECTION INTRAVENOUS at 08:40

## 2023-03-17 NOTE — PROGRESS NOTES
[address] Medicine Residency Attending Addendum:    I was NOT present with the resident during the interview & examination of the patient. I personally interviewed the patient & repeated the critical or key portions of the exam.  I agree with the resident's note with the following additions:    Hx: Admtited 3/11, renal abscess and stone, s/p cysto on 3/15 that relieved hydronephrosis. IR consulted for abscess but determined too small to drain. Pt transferred to our service on 3/17 from hospitalist.      Assessment/Plan:  1. Renal abscess: plan is for PICC and IV abx outpatient  2. Nephrolithiasis: s/p stent, outpatient follow up with urology, tano Pacheco DO 3/17/2023    Patient is on Family Medicine Service. Please page Family Medicine Resident (under groups on [employer] [facility]) with any questions or concerns.

## 2023-03-17 NOTE — DISCHARGE SUMMARY
Discharge / Transfer / Off-Service Note     Name: Seng Henry MRN: [id number]  Sex: Female   YOB: 1965  Age: 65 y.o. PCP: Ollie Flaherty MD     Date of admission: 3/11/2023  Date of discharge/transfer: 3/17/2023    Attending physician at admission: Dr. Mata Byrne  Attending physician at discharge/transfer: Dr. Adilene Carlson  Resident physician at discharge/transfer: Garrett Cook MD     Consultants during hospitalization  IP CONSULT TO [facility] TO INFECTIOUS DISEASES     Admission diagnoses   Renal abscess [N15.1]    Recommended follow-up after discharge    1. Kahlil Martinez MD  2. [facility] - Dr. Oswaldo Garduno  3. Infectious Disease - Dr. Talya Alvarado     Things to follow up on with PCP:   - Hypertension - holding home Maxzide due to normal blood pressures in hospital without medication  - Renal Abscess - ensure follow up CT and urology follow up   ----------------------------------------------------------------------------------------------------------------------------  -------------------------------------------------------------------------------------------    Medication Changes:  START   - IV Ceftriaxone - per infectious disease recs for renal abscess. To continue through 4/27  - Flomax 0.4mg - daily for nephrolithiasis      STOP   - Maxzide 75-50 - held due to normtensive pressures while holding during admission. Follow up to consider restarting       No other changes were made to your medications, please take all your other home medicines as previously prescribed. History of Present Illness    As per admitting provider, Dr. Mata Byrne:   \"Ms. Author Carlson is a 65 y.o.  female who presented to the Emergency Department complaining of right flank pain. As per the patient it started last night but it got worse this morning. It was in the right flank and it was a sharp in nature. It was 10 out of 10 in intensity.   She has a history of kidney stones in the past.  Patient was complaining of dysuria as well. She denies any nausea vomiting diarrhea constipation. No fever or chills. She denies any hematuria. Patient had a CT scan done which showed 6 mm obstructing stone in the right upper pole with renal abscess and hydronephrosis. [facility] was consulted. They will see the patient in consultation. She denies any chest pain or shortness of breath. MARIM[facility] DONDeshawn [facility]    63yo female with history of HTN admitted 3/11/23 for right sided pyelonephritis with 6mm obstructing stone and 2cm renal abscess. S/p cysto with stent on 3/15. Urine cultures remain negative. ID consulted for long term antibiotic recs. Discharged with IV antibiotics through 4/27, and outpatient follow up with urology for stone management. Renal Abscess:  2cm right renal upper pole abscess noted on CT 3/11. Found to be slightly decreased on follow up CT 3/14. No drainage indicated per IR. No growth on initial or repeat urine culture. - Ceftriaxone through 4/27/23  - Follow up with Infectious disease in 4 weeks  - repeat CT in 4 weeks for follow up abscess     Nephrolithiasis: 6cmm obstucting stone noted on CT. Urology consluted, s/p cysto with stent on 3/15.   - plan for outpatient follow up with urology  - continue Flomax     Hypertension: chronic, has been well controlled inpatient   - continue to hold home Maxzide 75-50 as Bps have been at goal while inpatient        Physical exam at discharge:    [address] Patient Vitals for the past 12 hrs:   Temp Pulse Resp BP SpO2   03/17/23 1346 98.6 °F (37 °C) 90 18 107/63 100 %   03/17/23 0813 97.9 °F (36.6 °C) 62 18 110/73 100 %   03/17/23 0415 98.3 °F (36.8 °C) 66 20 (!) 100/55 99 %        Condition at discharge: Stable.     Labs  Recent Labs     03/17/23  0249 03/16/23  0445 03/15/23  0404   WBC 6.0 8.5 9.3   HGB 10.4* 9.8* 10.7*   HCT 33.1* 31.0* 34.0*    256 250     Recent Labs     03/17/23  0249 03/16/23  0445 03/15/23  0404    140 137   K 3.7 3.8 4.0   * 110* 108   CO2 28 26 24   BUN 12 16 17   CREA 0.62 0.64 0.92   GLU 90 108* 124*   CA 8.5 8.4* 8.6     No results for input(s): ALT, AP, TBIL, TBILI, TP, ALB, GLOB, GGT, AML, LPSE in the last 72 hours. No lab exists for component: SGOT, GPT, AMYP, HLPSE  No results for input(s): PH, PCO2, PO2, TNIPOC, TROIQ, INR, PTP, APTT, FE, TIBC, PSAT, FERR, GLUCPOC, INREXT in the last 72 hours. No lab exists for component: GLPOC    Micro:  Lab Results   Component Value Date/Time    Culture result: No growth (<1,000 CFU/ML) 03/15/2023 10:12 AM    Culture result: No growth (<1,000 CFU/ML) 03/13/2023 04:14 PM       Imaging:  XR ABD (KUB)    Result Date: 3/15/2023  EXAM:  XR ABD (KUB) INDICATION: Reassess RIGHT ureteral stone COMPARISON: CT of the abdomen/pelvis March 14, 2023. TECHNIQUE: Supine frontal abdomen (KUB). FINDINGS: There are no dilated loops of bowel. Moderate amount of gas and stool are seen in the colon. There is retained contrast material within the RIGHT renal collecting system and the proximal aspect of the RIGHT ureter ending near the level of the previously identified stone. The stone is not distinctly visualized. Small amount of contrast is seen in the bladder. There is no evidence of free air. RIGHT ureteral stone is not distinctly visualized, but there is retained contrast material within the RIGHT renal collecting system and proximal ureter indicative of stone retention in the upper ureter. CT ABD PELV WO CONT    Result Date: 3/11/2023  CT ABDOMEN AND PELVIS WITHOUT CONTRAST. 3/11/2023 2:42 PM INDICATION: Flank pain, urinary tract infection. COMPARISON: None. TECHNIQUE: CT of the abdomen and pelvis was performed without contrast. CT dose reduction was achieved through use of a standardized protocol tailored for this examination and automatic exposure control for dose modulation. FINDINGS: Abdomen:  An obstructing calculus in the right upper pole collecting system measures 6 mm. There is focal hydronephrosis of several upper pole calyces. An adjacent hypodense mass with a hyperdense rim measures 23 x 27 x 30 mm, and likely represents a renal abscess or less likely calyceal rupture. Additional, small, left renal calculi are nonobstructing. The lung bases are clear. The heart size is normal. Post Jean Paul-en-Y gastric bypass and cholecystectomy. Incidental note is made of an hepatic cyst. The unenhanced distal esophagus, gastrojejunostomy, excluded stomach, biliary limb, liver, pancreas, spleen, adrenals, and kidneys are otherwise normal. Pelvis: Steatorrhea is a nonspecific finding of malabsorption. It is expected in Jean Paul-en-Y gastric bypass. The unenhanced small bowel, Jean Paul limb, jejunojejunostomy, ileocecal junction, appendix, and bladder are normal. No free air or fluid, and no abdominopelvic lymphadenopathy. 1. Obstructing calculus in the right upper pole collecting system. Focal hydronephrosis. 2. Probable right upper pole renal abscess. CT ABD PELV W CONT    Result Date: 3/12/2023  EXAM: CT ABD PELV W CONT INDICATION: stone, ? abscess R kidney COMPARISON: CT 3/11/2023. CONTRAST: 100 mL of Isovue-370. TECHNIQUE: Following the uneventful intravenous administration of contrast, thin axial images were obtained through the abdomen and pelvis. Coronal and sagittal reconstructions were generated. Oral contrast was not administered. CT dose reduction was achieved through use of a standardized protocol tailored for this examination and automatic exposure control for dose modulation. FINDINGS: LOWER THORAX: No significant abnormality in the incidentally imaged lower chest. LIVER: No mass. BILIARY TREE: Gallbladder is surgically absent. CBD is not dilated. SPLEEN: Unremarkable. PANCREAS: No mass or ductal dilatation. ADRENALS: Unremarkable.  KIDNEYS: There is redemonstration of an anterolateral upper pole abscess of the right kidney with hypoenhancement the adjacent renal parenchyma. There is redemonstration of a 6 mm calculus at the right ureteropelvic junction with no right hydronephrosis. Couple punctate calcifications are redemonstrated the lower pole collecting system of left kidney. No left hydronephrosis. Ureters are normal in course and caliber without calculi. STOMACH: Postsurgical changes of gastric bypass. Otherwise unremarkable. SMALL BOWEL: No dilatation or wall thickening. COLON: No dilatation or wall thickening. APPENDIX: Unremarkable. PERITONEUM: No ascites or pneumoperitoneum. RETROPERITONEUM: No lymphadenopathy or aortic aneurysm. REPRODUCTIVE ORGANS: Leiomyomatous uterus. Ovaries are unremarkable. URINARY BLADDER: No mass or calculus. BONES: Degenerative spine change. No acute fracture or aggressive lesion. ABDOMINAL WALL: No mass or hernia. ADDITIONAL COMMENTS: N/A     Redemonstration of upper pole right renal abscess with nonobstructing right ureteropelvic junction stone. CT ABD PELV W WO CONT    Result Date: 3/14/2023  EXAM: CT ABD PELV W WO CONT INDICATION: Reassess right renal abscess and stone COMPARISON: 3/11/2023. IV CONTRAST: 100 mL of Isovue-370. ORAL CONTRAST: None TECHNIQUE:  Multislice helical CT was performed from the diaphragm to the iliac crest prior to intravenous contrast administration and from the diaphragm to the symphysis pubis during uneventful rapid bolus intravenous contrast administration. Contiguous 5 mm axial images were reconstructed and lung and soft tissue windows were generated. Coronal and sagittal reformations were generated. CT dose reduction was achieved through use of a standardized protocol tailored for this examination and automatic exposure control for dose modulation. FINDINGS: LOWER THORAX: No significant abnormality in the incidentally imaged lower chest. LIVER: Small hypodensity right hepatic lobe unchanged BILIARY TREE: Cholecystectomy CBD is not dilated.  SPLEEN: within normal limits. PANCREAS: No mass or ductal dilatation. ADRENALS: Unremarkable. KIDNEYS: No mass, calculus, or hydronephrosis. Right renal lesion slightly decreased in size measuring 2.2 x 1.6 cm when measured in a similar fashion. Surrounding ill-defined hypodensity is again noted although improved. The right renal stone has now migrated into the proximal right ureter with mild right-sided hydronephrosis noted STOMACH: Postsurgical changes SMALL BOWEL: No dilatation or wall thickening. COLON: No dilatation or wall thickening. APPENDIX: Unremarkable PERITONEUM: No ascites or pneumoperitoneum. RETROPERITONEUM: No lymphadenopathy or aortic aneurysm. REPRODUCTIVE ORGANS: Probable uterine fibroids. URINARY BLADDER: No mass or calculus. BONES: No destructive bone lesion. ABDOMINAL WALL: No mass or hernia. ADDITIONAL COMMENTS: N/A     1. Right renal abscess is slightly decreased in size with decreased surrounding inflammation. 2.  Right-sided stone has migrated into the proximal right ureter with mild right-sided hydronephrosis now present. [facility] XR TECHNOLOGIST SERVICE    Result Date: 3/15/2023  Compliance only Spot fluoroscopic view of the abdomen was obtained during retrograde pyelogram.     See above.          Chronic diagnoses   Problem List as of 3/17/2023 Date Reviewed: 3/11/2023            Codes Class Noted - Resolved    Obstruction of left ureteropelvic junction due to stone ICD-10-CM: N20.1  ICD-9-CM: 592.1  3/12/2023 - Present        Hydronephrosis with urinary obstruction due to renal calculus ICD-10-CM: N13.2  ICD-9-CM: 190, 592.0  3/12/2023 - Present        * (Principal) Renal abscess, right ICD-10-CM: N15.1  ICD-9-CM: 590.2  3/11/2023 - Present        Nephrolithiasis ICD-10-CM: N20.0  ICD-9-CM: 592.0  3/11/2023 - Present        Weight gain, abnormal ICD-10-CM: R63.5  ICD-9-CM: 783.1  8/2/2018 - Present        Gastroesophageal reflux disease without esophagitis ICD-10-CM: K21.9  ICD-9-CM: 530.81  8/2/2018 - Present Uterine leiomyoma ICD-10-CM: D25.9  ICD-9-CM: 218.9  8/26/2016 - Present        Essential hypertension ICD-10-CM: I10  ICD-9-CM: 401.9  3/29/2016 - Present        Status post gastric bypass for obesity ICD-10-CM: Z98.84  ICD-9-CM: V45.86  3/29/2016 - Present        Cheloid scar ICD-10-CM: L91.0  ICD-9-CM: 701.4  3/29/2016 - Present        RESOLVED: Weight gain ICD-10-CM: R63.5  ICD-9-CM: 783.1  8/2/2018 - 8/2/2018           Current Discharge Medication List        START taking these medications    Details   tamsulosin (FLOMAX) 0.4 mg capsule Take 1 Capsule by mouth daily for 30 days. Indications: stones in the urinary tract  Qty: 30 Capsule, Refills: 0  Start date: 3/18/2023, End date: 4/17/2023           STOP taking these medications       triamterene-hydroCHLOROthiazide (MAXZIDE) 75-50 mg per tablet Comments:   Reason for Stopping:         eflornithine (VANIQA) 13.9 % topical cream Comments:   Reason for Stopping:         clobetasoL (TEMOVATE) 0.05 % external solution Comments:   Reason for Stopping:                Diet:  Regular diet.     Activity:  As tolerated     Disposition: Home or Self Care    Discharge instructions to patient/family  Please seek medical attention for any new or worsening symptoms particularly fever, chest pain, shortness of breath, abdominal pain, nausea, vomiting    Follow up plans/appointments  Follow-up Information       Follow up With Specialties Details Why [address] [facility] Urology Follow up on 3/31/2023 Dr. Rosi Chase at 10:10AM [address] [place] [address]   [address] [facility]    Hector Landis NP Family Nurse Practitioner, Nurse Practitioner Go on 3/30/2023 Hospitali follow up appointment on 3/30 at 9:40am [place] [address]  [phone]      Dariela Webster DO Infectious Disease Physician Schedule an appointment as soon as possible for a visit in 4 week(s)  [address] [address]  [facility] [address] [facility]  Follow up For IV antibiotics, weekly lab/dressing changes [address] [facility] [address]  [phone]    Valeria Aguilar MD Family Medicine, Bariatrics   [address]  [phone]               Jorge Wyatt MD  Family Medicine Resident, PGY2       For Billing    Chief Complaint   Patient presents with    Back Pain    Flank Pain       Hospital Problems  Date Reviewed: 3/11/2023            Codes Class Noted POA    Obstruction of left ureteropelvic junction due to stone ICD-10-CM: N20.1  ICD-9-CM: 592.1  3/12/2023 Yes        Hydronephrosis with urinary obstruction due to renal calculus ICD-10-CM: N13.2  ICD-9-CM: 060, 592.0  3/12/2023 Yes        * (Principal) Renal abscess, right ICD-10-CM: N15.1  ICD-9-CM: 590.2  3/11/2023 Yes        Nephrolithiasis ICD-10-CM: N20.0  ICD-9-CM: 592.0  3/11/2023 Yes        Essential hypertension ICD-10-CM: I10  ICD-9-CM: 401.9  3/29/2016 Yes

## 2023-03-17 NOTE — PROGRESS NOTES
Urology Progress Note    Subjective:     Daily Progress Note: 3/17/2023 8:32 AM    Maria Dolores Mitchell is 2 Days Post-Op and doing excellent. She reports pain is well controlled. She has no complaints. She is tolerating a solid diet and ambulating without assistance. Patient is voiding without difficulty. Reports improvement of dysuria with pyridium. UCX negative     Objective:     Visit Vitals  /73 (BP 1 Location: Left upper arm, BP Patient Position: Sitting; At rest)   Pulse 62   Temp 97.9 °F (36.6 °C)   Resp 18   Ht 5' 10\" (1.778 m)   Wt 81.2 kg (179 lb)   SpO2 100%   Breastfeeding No   BMI 25.68 kg/m²        Temp (24hrs), Av °F (36.7 °C), Min:97.7 °F (36.5 °C), Max:98.3 °F (36.8 °C)      Intake and Output:  03/15 1901 -  0700  In: 3961.3 [P.O.:1900; I.V.:2061.3]  Out: 400 [Urine:400]  No intake/output data recorded.     Physical Exam: \  General appearance: alert, cooperative, no distress, appears stated age  Respiratory: no distress, room air   Abdomen:soft, nontender   : voiding independently   Extremities: moves all     Data Review:    Recent Results (from the past 24 hour(s))   METABOLIC PANEL, BASIC    Collection Time: 23  2:49 AM   Result Value Ref Range    Sodium 140 136 - 145 mmol/L    Potassium 3.7 3.5 - 5.1 mmol/L    Chloride 110 (H) 97 - 108 mmol/L    CO2 28 21 - 32 mmol/L    Anion gap 2 (L) 5 - 15 mmol/L    Glucose 90 65 - 100 mg/dL    BUN 12 6 - 20 MG/DL    Creatinine 0.62 0.55 - 1.02 MG/DL    BUN/Creatinine ratio 19 12 - 20      eGFR >60 >60 ml/min/1.73m2    Calcium 8.5 8.5 - 10.1 MG/DL   CBC WITH AUTOMATED DIFF    Collection Time: 23  2:49 AM   Result Value Ref Range    WBC 6.0 3.6 - 11.0 K/uL    RBC 3.67 (L) 3.80 - 5.20 M/uL    HGB 10.4 (L) 11.5 - 16.0 g/dL    HCT 33.1 (L) 35.0 - 47.0 %    MCV 90.2 80.0 - 99.0 FL    MCH 28.3 26.0 - 34.0 PG    MCHC 31.4 30.0 - 36.5 g/dL    RDW 13.9 11.5 - 14.5 %    PLATELET 340 103 - 249 K/uL    MPV 10.2 8.9 - 12.9 FL    NRBC 0.0 0 PER 100 WBC    ABSOLUTE NRBC 0.00 0.00 - 0.01 K/uL    NEUTROPHILS 63 32 - 75 %    LYMPHOCYTES 27 12 - 49 %    MONOCYTES 6 5 - 13 %    EOSINOPHILS 3 0 - 7 %    BASOPHILS 1 0 - 1 %    IMMATURE GRANULOCYTES 0 0.0 - 0.5 %    ABS. NEUTROPHILS 3.9 1.8 - 8.0 K/UL    ABS. LYMPHOCYTES 1.6 0.8 - 3.5 K/UL    ABS. MONOCYTES 0.4 0.0 - 1.0 K/UL    ABS. EOSINOPHILS 0.2 0.0 - 0.4 K/UL    ABS. BASOPHILS 0.0 0.0 - 0.1 K/UL    ABS. IMM. GRANS. 0.0 0.00 - 0.04 K/UL    DF AUTOMATED         Assessment/Plan:     Principal Problem:    Renal abscess, right (3/11/2023)    Active Problems:    Essential hypertension (3/29/2016)      Nephrolithiasis (3/11/2023)      Obstruction of left ureteropelvic junction due to stone (3/12/2023)      Hydronephrosis with urinary obstruction due to renal calculus (3/12/2023)        Status Post:  Procedure(s):  CYSTOSCOPY WITH RETROGRADES PYELOGRAM AND RIGHT URETERAL STENT PLACEMENT     Plan:   2cm right renal abscess, right sided obstructing stone   -Case discussed with IR 3/12- abscess too small to drain. CT abd/pelv WWO repeated 3/14. Discussed with IR- slight improvement in abscess size, no plans for drainage. Right nonobstructing stone became obstructing on repeat scan.   -She is currently sp cystoscopy, right RPG, right ureteral stent insertion 3/15. Purulence noted from kidney in OR- repeat culture sent and resulted negative. Initial UCX was negative upon admission. She has remained without leukocytosis, afebrile, and generally doing well overall despite the purulence noted in her kidney/renal abscess the entire admission   -ID consulted to help with recs regarding abx/duration- recs greatly appreciated. -Definitive stone management at a later date once infection clears   -Continue flomax for stent discomfort, pyridium for dysuria     OK to DC today from urology standpoint pending ID recs  OP FU with VU arranged 3/31 with KUB (see DC section of chart).  F/U CT to be scheduled in three weeks with VU per ID (office will call pt with appt details for this next week). Will plan for stone tx after adequate treatment of abscess. Will consider repeating CT OP as well.  Discussed with Dr. Gabo Huntley     Signed By: Ember Tellez NP                         March 17, 2023

## 2023-03-17 NOTE — PROGRESS NOTES
PICC Placement Note    PRE-PROCEDURE VERIFICATION  Correct Procedure: yes  Correct Site:  yes  Temperature: Temp: 97.9 °F (36.6 °C), Temperature Source: Temp Source: Oral  Recent Labs     03/17/23  0249   BUN 12   CREA 0.62      WBC 6.0     Allergies: Fentanyl citrate (pf)  Education materials for PICC Care given: yes. See Patient Education activity for further details. PICC Booklet placed at bedside: yes    Closed Ended PICC Catheters:  Flush Lumens as Follows:  Intermittent Medication:   Flush before and after each medication with 10 ml NS. Unused Ports:  Flush every 8 hours with 10 ml NS.  TPN Ports:  Flush every 24 hours with 20 ml NS prior to hanging new bag. Blood Draws: Stop infusion, draw off and waste 10 ml of blood. Draw sample with 10cc syringe or greater. DO NOT USE VACUTAINER . Transfer with appropriate device to lab  tubes. Flush with 20 ml NS. Dressing Change:  Every 7 days, and PRN using sterile technique if integrity of dressing is compromised. Initial dressing change for central line 24-48 hours post insertion if gauze is used. Apply new dressing per policy. PROCEDURE DETAIL  Consent was obtained and all questions were answered related to risks and benefits. A single lumen PICC line was inserted, as a sterile procedure using ultrasound and modified Seldinger technique for antibiotic therapy. The following documentation is in addition to the PICC properties in the lines/airways flowsheet :  Lot #: [id number]  Lidocaine 1% administered intradermally :yes  Internal Catheter Total Length: 38 cm 1 cm out (cm)  Vein Selection for PICC:right basilic  Central Line Bundle followed yes  Complication Related to Insertion:no    The placement was verified by EKG, MAX P WAVE @ 38 cm 1 cm out (cm). PER EKG PICC TIP @ C/A junction.       Line is okay to use: yes    Francesca Almaraz RN

## 2023-03-17 NOTE — PROGRESS NOTES
[facility] Progress Note           Jeffery Ramos DO    [phone] Office  [phone]  Fax    3/17/2023      Assessment & Plan:     Right renal abscess. Too small to drain per interventional radiology. Urine culture taken at the time of stent placement is sterile at final reading. Plan discharge on ceftriaxone x6 weeks. Repeat imaging planned in the outpatient setting by urology. Recommend CT scan. If this reveals resolution of the abscess antibiotics may be stopped early. Patient to follow-up with me in 4 weeks. IV antibiotic orders are in the chart. Right-sided hydronephrosis due to obstructing stone. Status post cystoscopy with stent placement 3/15. Patient will need definitive stone treatment once infection has cleared  Hives. Thought to be due to fentanyl. Currently on levofloxacin and vancomycin. We will give test dose of ceftriaxone prior to discharge          Subjective:     No complaints    Objective:     Vitals: Visit Vitals  /73 (BP 1 Location: Left upper arm, BP Patient Position: Sitting; At rest)   Pulse 62   Temp 97.9 °F (36.6 °C)   Resp 18   Ht 5' 10\" (1.778 m)   Wt 179 lb (81.2 kg)   SpO2 100%   Breastfeeding No   BMI 25.68 kg/m²        Tmax:  Temp (24hrs), Av °F (36.7 °C), Min:97.7 °F (36.5 °C), Max:98.3 °F (36.8 °C)      Exam:   Patient is intubated: No    Physical Examination:   General:  Alert, cooperative, no distress   Head:  Normocephalic, atraumatic. Eyes:  Conjunctivae clear   Neck: Supple       Lungs:   No distress. Chest wall:     Heart:     Abdomen:   non-distended   Extremities: Moves all. Skin: No acute rash on exposed skin   Neurologic: CNII-XII intact. Normal strength     Labs:        No lab exists for component: ITNL   No results for input(s): CPK, CKMB, TROIQ in the last 72 hours.   Recent Labs     23  0249 23  0445 03/15/23  0404    140 137   K 3.7 3.8 4.0   * 110* 108   CO2 28 26 24   BUN 12 16 17   CREA 0.62 0.64 0.92   GLU 90 108* 124*   WBC 6.0 8.5 9.3   HGB 10.4* 9.8* 10.7*   HCT 33.1* 31.0* 34.0*    256 250     No results for input(s): INR, PTP, APTT, INREXT in the last 72 hours.   Needs: urine analysis, urine sodium, protein and creatinine  No results found for: SENA, CREAU      Cultures:     No results found for: SDES  Lab Results   Component Value Date/Time    Culture result: No growth (<1,000 CFU/ML) 03/15/2023 10:12 AM    Culture result: No growth (<1,000 CFU/ML) 03/13/2023 04:14 PM       Radiology:     Medications       Current Facility-Administered Medications   Medication Dose Route Frequency Last Admin    phenazopyridine (PYRIDIUM) tablet 200 mg  200 mg Oral TID PRN      ferrous gluconate 324 mg (38 mg iron) tablet 1 Tablet  1 Tablet Oral DAILY WITH BREAKFAST 1 Tablet at 03/17/23 0840    0.9% sodium chloride infusion  75 mL/hr IntraVENous CONTINUOUS 75 mL/hr at 03/15/23 2114    HYDROmorphone (DILAUDID) syringe 0.5 mg  0.5 mg IntraVENous Q4H PRN 0.5 mg at 03/17/23 0237    levoFLOXacin (LEVAQUIN) 500 mg in D5W IVPB  500 mg IntraVENous Q24H 500 mg at 03/16/23 1859    diphenhydrAMINE (BENADRYL) capsule 25 mg  25 mg Oral Q6H PRN 25 mg at 03/16/23 2335    hydrocortisone (CORTAID) 1 % cream   Topical PRN      sodium chloride (NS) flush 5-40 mL  5-40 mL IntraVENous Q8H 10 mL at 03/17/23 0840    sodium chloride (NS) flush 5-40 mL  5-40 mL IntraVENous PRN 10 mL at 03/17/23 0238    acetaminophen (TYLENOL) tablet 650 mg  650 mg Oral Q6H  mg at 03/15/23 1424    Or    acetaminophen (TYLENOL) suppository 650 mg  650 mg Rectal Q6H PRN      polyethylene glycol (MIRALAX) packet 17 g  17 g Oral DAILY PRN      ondansetron (ZOFRAN ODT) tablet 4 mg  4 mg Oral Q8H PRN      Or    ondansetron (ZOFRAN) injection 4 mg  4 mg IntraVENous Q6H PRN 4 mg at 03/15/23 0036    tamsulosin (FLOMAX) capsule 0.4 mg  0.4 mg Oral DAILY 0.4 mg at 03/17/23 0840    vancomycin (VANCOCIN) 1,000 mg in 0.9% sodium chloride 250 mL ([id number])  1,000 mg IntraVENous Q12H 1,000 mg at 03/17/23 0840           Case discussed with: Case management, PICC team      Ashley Lloyd DO

## 2023-03-17 NOTE — PROGRESS NOTES
Problem: Pain  Goal: *Control of Pain  Outcome: Progressing Towards Goal  Goal: *PALLIATIVE CARE:  Alleviation of Pain  Outcome: Progressing Towards Goal     Problem: Patient Education: Go to Patient Education Activity  Goal: Patient/Family Education  Outcome: Progressing Towards Goal     Problem: Falls - Risk of  Goal: *Absence of Falls  Description: Document Fitz Autumn Fall Risk and appropriate interventions in the flowsheet.   Outcome: Progressing Towards Goal  Note: Fall Risk Interventions:            Medication Interventions: Patient to call before getting OOB, Teach patient to arise slowly         History of Falls Interventions: Door open when patient unattended

## 2023-03-17 NOTE — PROGRESS NOTES
Post Discharge PICC and Antibiotic Orders    1. Diagnosis:  renal abscess  2. Antibiotic: Ceftriaxone 2 g IV daily through 4/27/2023  3. Routine PICC/ Siobhan/ Portacath Care including PRN catheter flow management  4. Weekly labs:   _x__CBC/diff/platelets   _x__BUN/Creatinine   ___CPK   _x__AST/TotalBilirubin/AlkalinePhosphatase   _x__CRP  5. Fax lab to [phone]  Call Critical Lab Values to [phone]  6. May send to IR for line evaluation or replacement -[phone]  7. Home Health to pull PIC line at end of therapy or send to IR for Siobhan removal  8. Allergies:     Allergies   Allergen Reactions    Fentanyl Citrate (Pf) Rash and Itching         Cher Mealing, DO

## 2023-03-17 NOTE — PROGRESS NOTES
2:07 pm:  YANNICK s/w Cesar at [facility].  Patient will come to their office weekly for dressing change/labs. He will be at the hospital be 3:30 pm for the teach.       Transition of Care Plan:  Medical management continues  Urology following - POD#2 CYSTOSCOPY WITH RETROGRADES PYELOGRAM AND RIGHT URETERAL STENT PLACEMENT  ID following - plan is for 6 weeks of IV abx (Ceftriaxone)  PICC placement today  CM following - referral sent to [address] follow-up  Family will transport at discharge    Deepak Godoy [facility]

## 2023-03-17 NOTE — PROGRESS NOTES
Subjective / Objective     65yo female with history of HTN admitted 3/11/23 for right sided pyelonephritis with 6mm obstructing stone and 2cm renal abscess. S/p cysto with stent on 3/15. Urine cultures remain negative. ID consulted for long term antibiotic recs. 24 Hour Events: No acute events overnight     Subjective: Pt was seen and examined at bedside. Patient continues to endorse mild dysuria, improved flank pain. Denies chest pain, SOB, nausea, vomiting. Tolerating PO well. Objective:    Respiratory:   O2 Device: None (Room air) Visit Vitals  /73 (BP 1 Location: Left upper arm, BP Patient Position: Sitting; At rest)   Pulse 62   Temp 97.9 °F (36.6 °C)   Resp 18   Ht 5' 10\" (1.778 m)   Wt 179 lb (81.2 kg)   SpO2 100%   Breastfeeding No   BMI 25.68 kg/m²     Physical Exam:  General: No acute distress. Alert. Cooperative. Respiratory: CTAB, no wheezes or crackles  Cardiovascular: Regular rate and rhythm, clear S1 S2. Pulses present bilaterally. GI: Nondistended. + bowel sounds. Mild right flank tenderness  Extremities: No LE edema. Pulses present. Skin: Warm, dry. Neuro: Alert and oriented, moving extremities spontanteously     I/O:  Date 03/16/23 0700 - 03/17/23 0659 03/17/23 0700 - 03/18/23 0659   Shift 4157-8053 0407-2540 24 Hour Total 6175-0340 4830-8736 24 Hour Total   INTAKE   P.O. 4869 912 5645        P. O. 7825 317 2302      I. V.(mL/kg/hr)  290(0.3) 290(0.1)        Volume (0.9% sodium chloride infusion)  40 40        Volume (vancomycin (VANCOCIN) 1,000 mg in 0.9% sodium chloride 250 mL ([id number]))  250 250      Shift Total(mL/kg) 1800(22.2) 390(4.8) 1949(71)      OUTPUT   Shift Total(mL/kg)         NET 0109 451 4029      Weight (kg) 81.2 81.2 81.2 81.2 81.2 81.2       Inpatient Medications  Current Facility-Administered Medications   Medication Dose Route Frequency    phenazopyridine (PYRIDIUM) tablet 200 mg  200 mg Oral TID PRN    ferrous gluconate 324 mg (38 mg iron) tablet 1 Tablet  1 Tablet Oral DAILY WITH BREAKFAST    0.9% sodium chloride infusion  75 mL/hr IntraVENous CONTINUOUS    HYDROmorphone (DILAUDID) syringe 0.5 mg  0.5 mg IntraVENous Q4H PRN    levoFLOXacin (LEVAQUIN) 500 mg in D5W IVPB  500 mg IntraVENous Q24H    diphenhydrAMINE (BENADRYL) capsule 25 mg  25 mg Oral Q6H PRN    hydrocortisone (CORTAID) 1 % cream   Topical PRN    sodium chloride (NS) flush 5-40 mL  5-40 mL IntraVENous Q8H    sodium chloride (NS) flush 5-40 mL  5-40 mL IntraVENous PRN    acetaminophen (TYLENOL) tablet 650 mg  650 mg Oral Q6H PRN    Or    acetaminophen (TYLENOL) suppository 650 mg  650 mg Rectal Q6H PRN    polyethylene glycol (MIRALAX) packet 17 g  17 g Oral DAILY PRN    ondansetron (ZOFRAN ODT) tablet 4 mg  4 mg Oral Q8H PRN    Or    ondansetron (ZOFRAN) injection 4 mg  4 mg IntraVENous Q6H PRN    tamsulosin (FLOMAX) capsule 0.4 mg  0.4 mg Oral DAILY    vancomycin (VANCOCIN) 1,000 mg in 0.9% sodium chloride 250 mL ([id number])  1,000 mg IntraVENous Q12H       Allergies  Allergies   Allergen Reactions    Fentanyl Citrate (Pf) Rash and Itching       CBC:  Recent Labs     03/17/23  0249 03/16/23  0445 03/15/23  0404   WBC 6.0 8.5 9.3   HGB 10.4* 9.8* 10.7*   HCT 33.1* 31.0* 34.0*    256 785       Metabolic Panel:  Recent Labs     03/17/23  0249 03/16/23  0445 03/15/23  0404    140 137   K 3.7 3.8 4.0   * 110* 108   CO2 28 26 24   BUN 12 16 17   CREA 0.62 0.64 0.92   GLU 90 108* 124*   CA 8.5 8.4* 8.6            Assessment and Plan     63yo female with history of HTN admitted 3/11/23 for right sided pyelonephritis with 6mm obstructing stone and 2cm renal abscess. S/p cysto with stent on 3/15. Urine cultures remain negative. ID consulted for long term antibiotic recs. Anticipate discharge later today after PICC placement, awaiting ID final recs. Renal Abscess:  2cm right renal upper pole abscess noted on CT 3/11. Found to be slightly decreased on follow up CT 3/14.  No drainage indicated per IR. No growth on initial or repeat urine culture. ID consulted, anticipate long term IV antibiotics. - ID consulted, appreciate recs. - Urology consulted, appreciate recs  - Anticipate outpatient IV antibiotics with PICC placement today. Awaiting final ID recs. - Continue Levo/ Vanc pending ID recs     Nephrolithiasis: 6cmm obstucting stone noted on CT.  Urology consluted, s/p cysto with stent on 3/15.   - Urology consulted, appreciate recs  - plan for outpatient follow up with urology  - continue Flomax  - continue fluids    Hypertension: chronic, has been well controlled inpatient   - continue to hold home Maxzide 75-50 as Bps have been at goal while inpatient        Delisa Black MD  Family Medicine Resident       For Billing    Chief Complaint   Patient presents with    Back Pain    Flank Pain       Hospital Problems  Date Reviewed: 3/11/2023            Codes Class Noted POA    Obstruction of left ureteropelvic junction due to stone ICD-10-CM: N20.1  ICD-9-CM: 592.1  3/12/2023 Yes        Hydronephrosis with urinary obstruction due to renal calculus ICD-10-CM: N13.2  ICD-9-CM: 730, 592.0  3/12/2023 Yes        * (Principal) Renal abscess, right ICD-10-CM: N15.1  ICD-9-CM: 590.2  3/11/2023 Yes        Nephrolithiasis ICD-10-CM: N20.0  ICD-9-CM: 592.0  3/11/2023 Yes        Essential hypertension ICD-10-CM: I10  ICD-9-CM: 401.9  3/29/2016 Yes

## 2023-03-17 NOTE — DISCHARGE INSTRUCTIONS
HOME DISCHARGE INSTRUCTIONS    Shilpi  / [id number] : 1965    Admission date: 3/11/2023 Discharge date: 3/17/2023 1:46 PM     Please bring this form with you to show your care provider at your follow-up appointment. Primary care provider:  Edilia Townsend     Discharging provider:  Devyn Dominguez MD - Family Medicine Resident  Dr. Kwabena Stewart Attending      You have been admitted to the hospital with the following diagnoses:    ACUTE DIAGNOSES:  Renal abscess [N15.1]    . . . . . . . . . . . . . . . . . . . . . . . . . . . . . . . . . . . . . . . . . . . . . . . . . . . . . . . . . . . . . . . . . . . . . . . .   You are well enough to be discharged from the hospital. However, because you were inpatient in a hospital, you are at greater risk of having been exposed to the coronavirus. PLEASE stay inside and self-quarantine for 14 days to prevent further spread of the coronavirus. . . . . . . . . . . . . . . . . . . . . . . . . . . . . . . . . . . . . . . . . . . . . . . . . . . . . . . . . . . . . . . . . . . . . . . . . MEDICATION CHANGES  START  - Flomax 0.4mg daily. This is a medicine to help with the resolution of your kidney stone. STOP  - Maxzide 75-50. This is a medicine that was treating your elevated blood pressure, however your pressure was well controlled in the hospital without the medicine. Please stop taking this for now and follow up with your PCP to consider restarting. No other changes were made to your medications, please take all your other home medicines as previously prescribed. . . . . . . . . . . . . . . . . . . . . . . . . . . . . . . . . . . . . . . . . . . . . . . . . . . . . . . . . . . . . . . . . . . . . . . . Sherrie López      FOLLOW-UP CARE RECOMMENDATIONS:    [facility]  [address] [place], [place] [id number]  Follow up on 3/31/2023  Dr. Loly Woods at 10:10AM    [place], [place]  [address] [address]  [phone]    Go on 3/30/2023  [facility] follow up appointment on 3/30 at 9:40am    Jaja Sanchez, [address]  Critical access hospital1 [address]  [phone]    Schedule an appointment as soon as possible for a visit in 4 week(s)           Follow-up tests needed: Repeat CT to evaluate the renal abscess size    Pending test results: At the time of your discharge the following test results are still pending: None. Please make sure you review these results with your outpatient follow-up provider(s). DIET/what to eat:  Regular Diet    ACTIVITY:  Activity as tolerated    Wound care: None    Equipment needed:  Peripheral Catheter and IV antibiotics    Specific symptoms to watch for: chest pain, shortness of breath, fever, chills, nausea, vomiting, diarrhea, change in mentation, falling, weakness, bleeding. What to do if new or unexpected symptoms occur? If you experience any of the above symptoms (or should other concerns or questions arise after discharge) please call your primary care physician. Return to the emergency room if you cannot get hold of your doctor. It is very important that you keep your follow-up appointment(s). Please bring discharge papers, medication list (and/or medication bottles) to your follow-up appointments for review by your outpatient provider(s). Please check the list of medications and be sure it includes every medication (even non-prescription medications) that your provider wants you to take. It is important that you take the medication exactly as they are prescribed. Keep your medication in the bottles provided by the pharmacist and keep a list of the medication names, dosages, and times to be taken in your wallet. Do not take other medications without consulting your doctor.    If you have any questions about your medications or other instructions, please talk to your nurse or care provider before you leave the hospital.     Information obtained by:     I understand that if any problems occur once I am at home I am to contact my physician. These instructions were explained to me and I had the opportunity to ask questions. I understand and acknowledge receipt of the instructions indicated above.                                                                                                                                                Physician's or R.N.'s Signature                                                                  Date/Time                                                                                                                                              Patient or Representative Signature                                                          Date/Time

## 2023-03-30 ENCOUNTER — OFFICE VISIT (OUTPATIENT)
Dept: FAMILY MEDICINE CLINIC | Age: 58
End: 2023-03-30
Payer: COMMERCIAL

## 2023-03-30 VITALS
SYSTOLIC BLOOD PRESSURE: 92 MMHG | TEMPERATURE: 97.5 F | BODY MASS INDEX: 26.34 KG/M2 | HEART RATE: 100 BPM | RESPIRATION RATE: 16 BRPM | WEIGHT: 184 LBS | HEIGHT: 70 IN | DIASTOLIC BLOOD PRESSURE: 60 MMHG | OXYGEN SATURATION: 99 %

## 2023-03-30 DIAGNOSIS — D50.9 IRON DEFICIENCY ANEMIA, UNSPECIFIED IRON DEFICIENCY ANEMIA TYPE: ICD-10-CM

## 2023-03-30 DIAGNOSIS — Z09 HOSPITAL DISCHARGE FOLLOW-UP: Primary | ICD-10-CM

## 2023-03-30 DIAGNOSIS — I10 ESSENTIAL HYPERTENSION: ICD-10-CM

## 2023-03-30 PROCEDURE — 99214 OFFICE O/P EST MOD 30 MIN: CPT | Performed by: NURSE PRACTITIONER

## 2023-03-30 PROCEDURE — 1111F DSCHRG MED/CURRENT MED MERGE: CPT | Performed by: NURSE PRACTITIONER

## 2023-03-30 RX ORDER — TRIAMTERENE AND HYDROCHLOROTHIAZIDE 75; 50 MG/1; MG/1
1 TABLET ORAL DAILY
Qty: 90 TABLET | Refills: 1 | Status: SHIPPED | OUTPATIENT
Start: 2023-03-30

## 2023-03-30 RX ORDER — TRIAMTERENE AND HYDROCHLOROTHIAZIDE 75; 50 MG/1; MG/1
TABLET ORAL DAILY
COMMUNITY
End: 2023-03-30 | Stop reason: SDUPTHER

## 2023-03-30 NOTE — PATIENT INSTRUCTIONS
We will follow up with labs  Eucerin cream daily for dry skin  Try Allegra for daily antihistamine  Follow up with Urology as scheduled

## 2023-03-30 NOTE — PROGRESS NOTES
Assessment/Plan:     Diagnoses and all orders for this visit:    1. Hospital discharge follow-up  -     [facility] [id number] MEDS RECONCILED W/CURRENT MED LIST  -     CBC WITH AUTOMATED DIFF; Future  -     IRON PROFILE; Future  -     FERRITIN; Future  -     VITAMIN B12; Future  Patient was seen for hospital discharge follow-up. Patient reports being admitted for renal abscess as well as stone. She is under care of urology and has follow-up appointment tomorrow. Patient reports doing well does have PICC line in place for IV antibiotics. Reports taking all medication as prescribed  2. Iron deficiency anemia, unspecified iron deficiency anemia type  -     CBC WITH AUTOMATED DIFF; Future  -     IRON PROFILE; Future  -     FERRITIN; Future  -     VITAMIN B12; Future  Patient does have history of iron deficiency was given iron in hospital we will repeat CBC and iron levels today  3. Essential hypertension  -     triamterene-hydroCHLOROthiazide (Maxzide) 75-50 mg per tablet; Take 1 Tablet by mouth daily. Indications: high blood pressure  Blood pressures controlled taking medication for blood pressure requesting refill. This has been done      Follow-up and Dispositions    Return in 1 month (on 4/30/2023), or if symptoms worsen or fail to improve. Discussed expected course/resolution/complications of diagnosis in detail with patient. Medication risks/benefits/costs/interactions/alternatives discussed with patient. Pt was given after visit summary which includes diagnoses, current medications & vitals. Pt expressed understanding with the diagnosis and plan        Subjective:      Rosi Flores is a 65 y.o. female who presents for had concerns including Hospital Follow Up (Patient reports taking antibiotic IV but does not remember name of medication.), Kidney Stone, Insect Bite (Left Forearm), and Rash.      Current Outpatient Medications   Medication Sig Dispense Refill    triamterene-hydroCHLOROthiazide (Maxzide) 75-50 mg per tablet Take 1 Tablet by mouth daily. Indications: high blood pressure 90 Tablet 1    tamsulosin (FLOMAX) 0.4 mg capsule Take 1 Capsule by mouth daily for 30 days. Indications: stones in the urinary tract 30 Capsule 0       Allergies   Allergen Reactions    Fentanyl Citrate (Pf) Rash and Itching     Past Medical History:   Diagnosis Date    Anemia     History of vascular access device 2023    4 Romanian single lumen PICC line right basilic 38 cm 1 cm out    Hypertension     Menorrhagia      Past Surgical History:   Procedure Laterality Date    HX BUNIONECTOMY Bilateral     HX  SECTION      HX GASTRIC BYPASS       Family History   Problem Relation Age of Onset    Hypertension Mother     Diabetes Mother     Cancer Mother         cervical    Heart Disease Mother     Prostate Cancer Father     Hypertension Father     Breast Cancer Paternal Aunt     Uterine Cancer Sister     Diabetes Sister      Social History     Socioeconomic History    Marital status:      Spouse name: Not on file    Number of children: Not on file    Years of education: Not on file    Highest education level: Not on file   Occupational History    Not on file   Tobacco Use    Smoking status: Never    Smokeless tobacco: Never   Vaping Use    Vaping Use: Never used   Substance and Sexual Activity    Alcohol use: No    Drug use: No    Sexual activity: Not Currently     Partners: Male   Other Topics Concern    Not on file   Social History Narrative    Not on file     Social Determinants of Health     Financial Resource Strain: Low Risk     Difficulty of Paying Living Expenses: Not hard at all   Food Insecurity: No Food Insecurity    Worried About Running Out of Food in the Last Year: Never true    Reuben of Food in the Last Year: Never true   Transportation Needs: No Transportation Needs    Lack of Transportation (Medical): No    Lack of Transportation (Non-Medical):  No Physical Activity: Insufficiently Active    Days of Exercise per Week: 4 days    Minutes of Exercise per Session: 30 min   Stress: Not on file   Social Connections: Not on file   Intimate Partner Violence: Not on file   Housing Stability: Unknown    Unable to Pay for Housing in the Last Year: No    Number of Places Lived in the Last Year: Not on file    Unstable Housing in the Last Year: No       HPI      ROS:   Review of Systems   Constitutional:  Negative for fever and malaise/fatigue. HENT:  Negative for congestion, sinus pain and sore throat. Eyes: Negative. Respiratory:  Negative for cough and shortness of breath. Cardiovascular:  Negative for chest pain. Gastrointestinal:  Positive for abdominal pain. Negative for diarrhea, nausea and vomiting. Genitourinary:  Negative for dysuria, flank pain, frequency and urgency. Musculoskeletal: Negative. Skin: Negative. Neurological:  Negative for dizziness and headaches. Endo/Heme/Allergies:  Negative for environmental allergies. Psychiatric/Behavioral: Negative. Objective:   Visit Vitals  BP 92/60 (BP 1 Location: Left upper arm, BP Patient Position: Sitting, BP Cuff Size: Adult)   Pulse 100   Temp 97.5 °F (36.4 °C) (Skin)   Resp 16   Ht 5' 10\" (1.778 m)   Wt 184 lb (83.5 kg)   SpO2 99%   BMI 26.40 kg/m²         Vitals and Nurse Documentation reviewed. Physical Exam  Vitals and nursing note reviewed. Constitutional:       General: She is not in acute distress. Appearance: Normal appearance. HENT:      Head: Normocephalic and atraumatic. Nose: Nose normal.      Mouth/Throat:      Mouth: Mucous membranes are moist.   Eyes:      Pupils: Pupils are equal, round, and reactive to light. Cardiovascular:      Rate and Rhythm: Normal rate and regular rhythm. Pulses: Normal pulses. Heart sounds: Normal heart sounds. Pulmonary:      Effort: Pulmonary effort is normal.      Breath sounds: Normal breath sounds. Abdominal:      General: Abdomen is flat. Musculoskeletal:         General: Normal range of motion. Cervical back: Normal range of motion. Skin:     General: Skin is warm and dry. Capillary Refill: Capillary refill takes less than 2 seconds. Neurological:      General: No focal deficit present. Mental Status: She is alert and oriented to person, place, and time. Mental status is at baseline.    Psychiatric:         Mood and Affect: Mood normal.         Behavior: Behavior normal.     Results for orders placed or performed during the hospital encounter of 03/11/23   CULTURE, URINE    Specimen: Clean catch; Urine   Result Value Ref Range    Special Requests: NO SPECIAL REQUESTS      Culture result: No growth (<1,000 CFU/ML)     CULTURE, URINE    Specimen: Urine    BLADDER   Result Value Ref Range    Special Requests: NO SPECIAL REQUESTS      Culture result: No growth (<1,000 CFU/ML)     URINALYSIS W/ REFLEX CULTURE    Specimen: Urine   Result Value Ref Range    Color YELLOW/STRAW      Appearance CLEAR CLEAR      Specific gravity 1.006 1.003 - 1.030      pH (UA) 6.5 5.0 - 8.0      Protein Negative NEG mg/dL    Glucose Negative NEG mg/dL    Ketone Negative NEG mg/dL    Bilirubin Negative NEG      Blood SMALL (A) NEG      Urobilinogen 0.2 0.2 - 1.0 EU/dL    Nitrites Negative NEG      Leukocyte Esterase SMALL (A) NEG      WBC 5-10 0 - 4 /hpf    RBC 0-5 /hpf    Epithelial cells FEW FEW /lpf    Bacteria 1+ (A) NEG /hpf    UA:UC IF INDICATED CULTURE NOT INDICATED BY UA RESULT     CBC WITH AUTOMATED DIFF   Result Value Ref Range    WBC 7.4 3.6 - 11.0 K/uL    RBC 4.26 3.80 - 5.20 M/uL    HGB 12.2 11.5 - 16.0 g/dL    HCT 38.3 35.0 - 47.0 %    MCV 89.9 80.0 - 99.0 FL    MCH 28.6 26.0 - 34.0 PG    MCHC 31.9 30.0 - 36.5 g/dL    RDW 13.6 11.5 - 14.5 %    PLATELET 047 977 - 513 K/uL    MPV 11.2 8.9 - 12.9 FL    NRBC 0.0 0  WBC    ABSOLUTE NRBC 0.00 0.00 - 0.01 K/uL    NEUTROPHILS 64 32 - 75 % LYMPHOCYTES 24 12 - 49 %    MONOCYTES 11 5 - 13 %    EOSINOPHILS 1 (L) 7 %    BASOPHILS 0 0 - 1 %    IMMATURE GRANULOCYTES 0 0 - 0.5 %    ABS. NEUTROPHILS 4.7 1.8 - 8.0 K/UL    ABS. LYMPHOCYTES 1.7 0.8 - 3.5 K/UL    ABS. MONOCYTES 0.8 0.0 - 1.0 K/UL    ABS. EOSINOPHILS 0.1 0.0 - 0.4 K/UL    ABS. BASOPHILS 0.0 0 - 1 K/UL    ABS. IMM. GRANS. 0.0 0.00 - 0.04 K/UL    DF AUTOMATED     METABOLIC PANEL, COMPREHENSIVE   Result Value Ref Range    Sodium 138 136 - 145 mmol/L    Potassium 4.1 3.5 - 5.1 mmol/L    Chloride 97 (L) 98 - 107 mmol/L    CO2 30 (H) 22 - 29 mmol/L    Anion gap 11 5 - 15 mmol/L    Glucose 86 65 - 100 mg/dL    BUN 11 6 - 20 MG/DL    Creatinine 0.66 0.50 - 0.90 MG/DL    BUN/Creatinine ratio 17 12 - 20      eGFR >60 >60 ml/min/1.73m2    Calcium 9.4 8.6 - 10.0 MG/DL    Bilirubin, total 0.4 0.2 - 1.0 MG/DL    ALT (SGPT) 16 10 - 35 U/L    AST (SGOT) 31 10 - 35 U/L    Alk.  phosphatase 172 (H) 35 - 104 U/L    Protein, total 8.7 (H) 6.4 - 8.3 g/dL    Albumin 4.0 3.5 - 5.2 g/dL    Globulin 4.7 (H) 2.0 - 4.0 g/dL    A-G Ratio 0.9 (L) 1.1 - 2.2     METABOLIC PANEL, BASIC   Result Value Ref Range    Sodium 137 136 - 145 mmol/L    Potassium 3.5 3.5 - 5.1 mmol/L    Chloride 102 97 - 108 mmol/L    CO2 31 21 - 32 mmol/L    Anion gap 4 (L) 5 - 15 mmol/L    Glucose 87 65 - 100 mg/dL    BUN 9 6 - 20 MG/DL    Creatinine 0.64 0.55 - 1.02 MG/DL    BUN/Creatinine ratio 14 12 - 20      eGFR >60 >60 ml/min/1.73m2    Calcium 9.1 8.5 - 10.1 MG/DL   CBC W/O DIFF   Result Value Ref Range    WBC 5.6 3.6 - 11.0 K/uL    RBC 3.67 (L) 3.80 - 5.20 M/uL    HGB 10.4 (L) 11.5 - 16.0 g/dL    HCT 32.5 (L) 35.0 - 47.0 %    MCV 88.6 80.0 - 99.0 FL    MCH 28.3 26.0 - 34.0 PG    MCHC 32.0 30.0 - 36.5 g/dL    RDW 13.8 11.5 - 14.5 %    PLATELET 152 519 - 819 K/uL    MPV 10.4 8.9 - 12.9 FL    NRBC 0.0 0  WBC    ABSOLUTE NRBC 0.00 0.00 - 8.79 K/uL   METABOLIC PANEL, BASIC   Result Value Ref Range    Sodium 138 136 - 145 mmol/L    Potassium 3.5 3.5 - 5.1 mmol/L    Chloride 107 97 - 108 mmol/L    CO2 29 21 - 32 mmol/L    Anion gap 2 (L) 5 - 15 mmol/L    Glucose 90 65 - 100 mg/dL    BUN 12 6 - 20 MG/DL    Creatinine 0.59 0.55 - 1.02 MG/DL    BUN/Creatinine ratio 20 12 - 20      eGFR >60 >60 ml/min/1.73m2    Calcium 8.7 8.5 - 10.1 MG/DL   VITAMIN B12   Result Value Ref Range    Vitamin B12 142 (L) 193 - 986 pg/mL   FERRITIN   Result Value Ref Range    Ferritin 20 (L) 26 - 388 NG/ML   IRON PROFILE   Result Value Ref Range    Iron 29 (L) 35 - 150 ug/dL    TIBC 309 250 - 450 ug/dL    Iron % saturation 9 (L) 20 - 50 %   SAMPLES BEING HELD   Result Value Ref Range    SAMPLES BEING HELD 1ua     COMMENT        Add-on orders for these samples will be processed based on acceptable specimen integrity and analyte stability, which may vary by analyte. METABOLIC PANEL, BASIC   Result Value Ref Range    Sodium 141 136 - 145 mmol/L    Potassium 3.6 3.5 - 5.1 mmol/L    Chloride 111 (H) 97 - 108 mmol/L    CO2 28 21 - 32 mmol/L    Anion gap 2 (L) 5 - 15 mmol/L    Glucose 89 65 - 100 mg/dL    BUN 11 6 - 20 MG/DL    Creatinine 0.60 0.55 - 1.02 MG/DL    BUN/Creatinine ratio 18 12 - 20      eGFR >60 >60 ml/min/1.73m2    Calcium 8.9 8.5 - 10.1 MG/DL   VANCOMYCIN, RANDOM   Result Value Ref Range    Vancomycin, random 20.4 UG/ML   CBC WITH AUTOMATED DIFF   Result Value Ref Range    WBC 5.3 3.6 - 11.0 K/uL    RBC 4.11 3.80 - 5.20 M/uL    HGB 11.7 11.5 - 16.0 g/dL    HCT 37.3 35.0 - 47.0 %    MCV 90.8 80.0 - 99.0 FL    MCH 28.5 26.0 - 34.0 PG    MCHC 31.4 30.0 - 36.5 g/dL    RDW 13.8 11.5 - 14.5 %    PLATELET 702 506 - 730 K/uL    MPV 9.7 8.9 - 12.9 FL    NRBC 0.0 0  WBC    ABSOLUTE NRBC 0.00 0.00 - 0.01 K/uL    NEUTROPHILS 73 32 - 75 %    LYMPHOCYTES 18 12 - 49 %    MONOCYTES 7 5 - 13 %    EOSINOPHILS 2 0 - 7 %    BASOPHILS 0 0 - 1 %    IMMATURE GRANULOCYTES 0 0.0 - 0.5 %    ABS. NEUTROPHILS 3.8 1.8 - 8.0 K/UL    ABS. LYMPHOCYTES 1.0 0.8 - 3.5 K/UL    ABS.  MONOCYTES 0.4 0.0 - 1.0 K/UL    ABS. EOSINOPHILS 0.1 0.0 - 0.4 K/UL    ABS. BASOPHILS 0.0 0.0 - 0.1 K/UL    ABS. IMM. GRANS. 0.0 0.00 - 0.04 K/UL    DF AUTOMATED     CBC WITH AUTOMATED DIFF   Result Value Ref Range    WBC 9.3 3.6 - 11.0 K/uL    RBC 3.74 (L) 3.80 - 5.20 M/uL    HGB 10.7 (L) 11.5 - 16.0 g/dL    HCT 34.0 (L) 35.0 - 47.0 %    MCV 90.9 80.0 - 99.0 FL    MCH 28.6 26.0 - 34.0 PG    MCHC 31.5 30.0 - 36.5 g/dL    RDW 13.6 11.5 - 14.5 %    PLATELET 336 274 - 166 K/uL    MPV 10.0 8.9 - 12.9 FL    NRBC 0.0 0  WBC    ABSOLUTE NRBC 0.00 0.00 - 0.01 K/uL    NEUTROPHILS 84 (H) 32 - 75 %    LYMPHOCYTES 8 (L) 12 - 49 %    MONOCYTES 7 5 - 13 %    EOSINOPHILS 1 0 - 7 %    BASOPHILS 0 0 - 1 %    IMMATURE GRANULOCYTES 0 0.0 - 0.5 %    ABS. NEUTROPHILS 7.8 1.8 - 8.0 K/UL    ABS. LYMPHOCYTES 0.7 (L) 0.8 - 3.5 K/UL    ABS. MONOCYTES 0.7 0.0 - 1.0 K/UL    ABS. EOSINOPHILS 0.1 0.0 - 0.4 K/UL    ABS. BASOPHILS 0.0 0.0 - 0.1 K/UL    ABS. IMM.  GRANS. 0.0 0.00 - 0.04 K/UL    DF SMEAR SCANNED      RBC COMMENTS NORMOCYTIC, NORMOCHROMIC     METABOLIC PANEL, BASIC   Result Value Ref Range    Sodium 137 136 - 145 mmol/L    Potassium 4.0 3.5 - 5.1 mmol/L    Chloride 108 97 - 108 mmol/L    CO2 24 21 - 32 mmol/L    Anion gap 5 5 - 15 mmol/L    Glucose 124 (H) 65 - 100 mg/dL    BUN 17 6 - 20 MG/DL    Creatinine 0.92 0.55 - 1.02 MG/DL    BUN/Creatinine ratio 18 12 - 20      eGFR >60 >60 ml/min/1.73m2    Calcium 8.6 8.5 - 78.8 MG/DL   METABOLIC PANEL, BASIC   Result Value Ref Range    Sodium 140 136 - 145 mmol/L    Potassium 3.8 3.5 - 5.1 mmol/L    Chloride 110 (H) 97 - 108 mmol/L    CO2 26 21 - 32 mmol/L    Anion gap 4 (L) 5 - 15 mmol/L    Glucose 108 (H) 65 - 100 mg/dL    BUN 16 6 - 20 MG/DL    Creatinine 0.64 0.55 - 1.02 MG/DL    BUN/Creatinine ratio 25 (H) 12 - 20      eGFR >60 >60 ml/min/1.73m2    Calcium 8.4 (L) 8.5 - 10.1 MG/DL   CBC WITH AUTOMATED DIFF   Result Value Ref Range    WBC 8.5 3.6 - 11.0 K/uL    RBC 3.45 (L) 3.80 - 5.20 M/uL    HGB 9.8 (L) 11.5 - 16.0 g/dL    HCT 31.0 (L) 35.0 - 47.0 %    MCV 89.9 80.0 - 99.0 FL    MCH 28.4 26.0 - 34.0 PG    MCHC 31.6 30.0 - 36.5 g/dL    RDW 13.9 11.5 - 14.5 %    PLATELET 741 920 - 043 K/uL    MPV 10.0 8.9 - 12.9 FL    NRBC 0.0 0  WBC    ABSOLUTE NRBC 0.00 0.00 - 0.01 K/uL    NEUTROPHILS 77 (H) 32 - 75 %    LYMPHOCYTES 14 12 - 49 %    MONOCYTES 8 5 - 13 %    EOSINOPHILS 1 0 - 7 %    BASOPHILS 0 0 - 1 %    IMMATURE GRANULOCYTES 0 0.0 - 0.5 %    ABS. NEUTROPHILS 6.5 1.8 - 8.0 K/UL    ABS. LYMPHOCYTES 1.2 0.8 - 3.5 K/UL    ABS. MONOCYTES 0.7 0.0 - 1.0 K/UL    ABS. EOSINOPHILS 0.1 0.0 - 0.4 K/UL    ABS. BASOPHILS 0.0 0.0 - 0.1 K/UL    ABS. IMM. GRANS. 0.0 0.00 - 0.04 K/UL    DF AUTOMATED     METABOLIC PANEL, BASIC   Result Value Ref Range    Sodium 140 136 - 145 mmol/L    Potassium 3.7 3.5 - 5.1 mmol/L    Chloride 110 (H) 97 - 108 mmol/L    CO2 28 21 - 32 mmol/L    Anion gap 2 (L) 5 - 15 mmol/L    Glucose 90 65 - 100 mg/dL    BUN 12 6 - 20 MG/DL    Creatinine 0.62 0.55 - 1.02 MG/DL    BUN/Creatinine ratio 19 12 - 20      eGFR >60 >60 ml/min/1.73m2    Calcium 8.5 8.5 - 10.1 MG/DL   CBC WITH AUTOMATED DIFF   Result Value Ref Range    WBC 6.0 3.6 - 11.0 K/uL    RBC 3.67 (L) 3.80 - 5.20 M/uL    HGB 10.4 (L) 11.5 - 16.0 g/dL    HCT 33.1 (L) 35.0 - 47.0 %    MCV 90.2 80.0 - 99.0 FL    MCH 28.3 26.0 - 34.0 PG    MCHC 31.4 30.0 - 36.5 g/dL    RDW 13.9 11.5 - 14.5 %    PLATELET 820 697 - 173 K/uL    MPV 10.2 8.9 - 12.9 FL    NRBC 0.0 0  WBC    ABSOLUTE NRBC 0.00 0.00 - 0.01 K/uL    NEUTROPHILS 63 32 - 75 %    LYMPHOCYTES 27 12 - 49 %    MONOCYTES 6 5 - 13 %    EOSINOPHILS 3 0 - 7 %    BASOPHILS 1 0 - 1 %    IMMATURE GRANULOCYTES 0 0.0 - 0.5 %    ABS. NEUTROPHILS 3.9 1.8 - 8.0 K/UL    ABS. LYMPHOCYTES 1.6 0.8 - 3.5 K/UL    ABS. MONOCYTES 0.4 0.0 - 1.0 K/UL    ABS. EOSINOPHILS 0.2 0.0 - 0.4 K/UL    ABS. BASOPHILS 0.0 0.0 - 0.1 K/UL    ABS. IMM.  GRANS. 0.0 0.00 - 0.04 K/UL    DF AUTOMATED         Transitional Care Management Progress Note    Patient: Clay Mcfarland  : 1965  PCP: Kiara Amanda MD    Date of office visit: 3/30/2023   Date of admission: 3/11/23  Date of discharge: 3/17/23  Hospital: [facility]    Call initiated w/i 2 business dates of discharge: *No response documented in the last 14 days   Date of the most recent call to the patient: *No documented post hospital discharge outreach found in the last 14 days      Assessment/Plan:   Renal stone follow up     Subjective:   Clay Mcfarland is a 65 y.o. female presenting today for follow-up after hospital discharge. This encounter and supporting documentation was reviewed if available. Medication reconciliation was performed today. The main problem requiring admission was Renal stone/ ascess. Complications during admission: none      Interval history/Current status: better    Admitting symptoms have: improved      Medications marked \"taking\" at this time:  Prior to Admission medications    Medication Sig Start Date End Date Taking? Authorizing Provider   triamterene-hydroCHLOROthiazide (Maxzide) 75-50 mg per tablet Take  by mouth daily. Yes Provider, Historical   tamsulosin (FLOMAX) 0.4 mg capsule Take 1 Capsule by mouth daily for 30 days.  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    Uterine leiomyoma D25.9    Weight gain, abnormal R63.5    Gastroesophageal reflux disease without esophagitis K21.9    Renal abscess, right N15.1    Nephrolithiasis N20.0    Obstruction of left ureteropelvic junction due to stone N20.1    Hydronephrosis with urinary obstruction due to renal calculus N13.2     Patient Active Problem List    Diagnosis Date Noted    Obstruction of left ureteropelvic junction due to stone 03/12/2023    Hydronephrosis with urinary obstruction due to renal calculus 03/12/2023    Renal abscess, right 03/11/2023    Nephrolithiasis 03/11/2023    Weight gain, abnormal 08/02/2018    Gastroesophageal reflux disease without esophagitis 08/02/2018    Uterine leiomyoma 08/26/2016    Essential hypertension 03/29/2016    Status post gastric bypass for obesity 03/29/2016    Cheloid scar 03/29/2016     Current Outpatient Medications   Medication Sig Dispense Refill    triamterene-hydroCHLOROthiazide (Maxzide) 75-50 mg per tablet Take  by mouth daily.  tamsulosin (FLOMAX) 0.4 mg capsule Take 1 Capsule by mouth daily for 30 days. Indications: stones in the urinary tract 30 Capsule 0     Allergies   Allergen Reactions    Fentanyl Citrate (Pf) Rash and Itching     Past Medical History:   Diagnosis Date    Anemia     History of vascular access device 03/17/2023    4 Chinese single lumen PICC line right basilic 38 cm 1 cm out    Hypertension     Menorrhagia           Objective:   Visit Vitals  BP 92/60 (BP 1 Location: Left upper arm, BP Patient Position: Sitting, BP Cuff Size: Adult)   Pulse 100   Temp 97.5 °F (36.4 °C) (Skin)   Resp 16   Ht 5' 10\" (1.778 m)   Wt 184 lb (83.5 kg)   SpO2 99%   BMI 26.40 kg/m²        Physical Examination: General appearance - alert, well appearing, and in no distress, oriented to person, place, and time, and normal appearing weight  Mental status - alert, oriented to person, place, and time, normal mood, behavior, speech, dress, motor activity, and thought processes       We discussed the expected course, resolution and complications of the diagnosis(es) in detail. Medication risks, benefits, costs, interactions, and alternatives were discussed as indicated.   I advised her to contact the office if her condition worsens, changes or fails to improve as anticipated. She expressed understanding with the diagnosis(es) and plan.      Roberto Tucker NP

## 2023-03-30 NOTE — PROGRESS NOTES
1. Have you been to the ER, urgent care clinic since your last visit? Hospitalized since your last visit? Yes When: 3/11/2023 Where: [facility] Reason for visit: Renal Abscess     2. Have you seen or consulted any other health care providers outside of the [address] [place] [address] since your last visit? Include any pap smears or colon screening. No    Chief Complaint   Patient presents with    Hospital Follow Up     Patient reports taking antibiotic IV but does not remember name of medication. Kidney Stone    Insect Bite     Left Forearm    Rash     Health Maintenance Due   Topic Date Due    COVID-19 Vaccine (1) Never done    DTaP/Tdap/Td series (1 - Tdap) Never done    Colorectal Cancer Screening Combo  Never done    Shingles Vaccine (1 of 2) Never done    Flu Vaccine (1) 08/01/2022     3 most recent PHQ Screens 3/30/2023   Little interest or pleasure in doing things Not at all   Feeling down, depressed, irritable, or hopeless Not at all   Total Score PHQ 2 0   Trouble falling or staying asleep, or sleeping too much Not at all   Feeling tired or having little energy Not at all   Poor appetite, weight loss, or overeating Not at all   Feeling bad about yourself - or that you are a failure or have let yourself or your family down Not at all   Trouble concentrating on things such as school, work, reading, or watching TV Not at all   Moving or speaking so slowly that other people could have noticed; or the opposite being so fidgety that others notice Not at all   Thoughts of being better off dead, or hurting yourself in some way Not at all   PHQ 9 Score 0   How difficult have these problems made it for you to do your work, take care of your home and get along with others Not difficult at all     Abuse Screening Questionnaire 3/30/2023   Do you ever feel afraid of your partner? N   Are you in a relationship with someone who physically or mentally threatens you? N   Is it safe for you to go home?  Y     Visit Vitals  BP 92/60 (BP 1 Location: Left upper arm, BP Patient Position: Sitting, BP Cuff Size: Adult)   Pulse 100   Temp 97.5 °F (36.4 °C) (Skin)   Resp 16   Ht 5' 10\" (1.778 m)   Wt 184 lb (83.5 kg)   SpO2 99%   BMI 26.40 kg/m²

## 2023-03-31 LAB
BASOPHILS # BLD: 0.1 K/UL (ref 0–0.1)
BASOPHILS NFR BLD: 2 % (ref 0–1)
DIFFERENTIAL METHOD BLD: ABNORMAL
EOSINOPHIL # BLD: 0.2 K/UL (ref 0–0.4)
EOSINOPHIL NFR BLD: 4 % (ref 0–7)
ERYTHROCYTE [DISTWIDTH] IN BLOOD BY AUTOMATED COUNT: 14.2 % (ref 11.5–14.5)
FERRITIN SERPL-MCNC: 12 NG/ML (ref 26–388)
HCT VFR BLD AUTO: 40.3 % (ref 35–47)
HGB BLD-MCNC: 12 G/DL (ref 11.5–16)
IMM GRANULOCYTES # BLD AUTO: 0 K/UL (ref 0–0.04)
IMM GRANULOCYTES NFR BLD AUTO: 0 % (ref 0–0.5)
IRON SATN MFR SERPL: 21 % (ref 20–50)
IRON SERPL-MCNC: 97 UG/DL (ref 35–150)
LYMPHOCYTES # BLD: 1.3 K/UL (ref 0.8–3.5)
LYMPHOCYTES NFR BLD: 30 % (ref 12–49)
MCH RBC QN AUTO: 28.2 PG (ref 26–34)
MCHC RBC AUTO-ENTMCNC: 29.8 G/DL (ref 30–36.5)
MCV RBC AUTO: 94.8 FL (ref 80–99)
MONOCYTES # BLD: 0.4 K/UL (ref 0–1)
MONOCYTES NFR BLD: 9 % (ref 5–13)
NEUTS SEG # BLD: 2.4 K/UL (ref 1.8–8)
NEUTS SEG NFR BLD: 55 % (ref 32–75)
NRBC # BLD: 0 K/UL (ref 0–0.01)
NRBC BLD-RTO: 0 PER 100 WBC
PLATELET # BLD AUTO: 334 K/UL (ref 150–400)
PMV BLD AUTO: 10.9 FL (ref 8.9–12.9)
RBC # BLD AUTO: 4.25 M/UL (ref 3.8–5.2)
TIBC SERPL-MCNC: 466 UG/DL (ref 250–450)
VIT B12 SERPL-MCNC: 168 PG/ML (ref 193–986)
WBC # BLD AUTO: 4.4 K/UL (ref 3.6–11)

## 2023-03-31 NOTE — OP NOTES
[facility]  OPERATIVE REPORT    Name:  Delvin Fine  MR#:  [id number]  :  1965  ACCOUNT #:  [de-identified]  DATE OF SERVICE:  2023    ADDENDUM TO JOB #[id number] - DL - 3/31/2023 2:33 PM    PREOPERATIVE DIAGNOSES:  Right renal abscess, right proximal ureteral stone. POSTOPERATIVE DIAGNOSES:  Right renal abscess, right proximal ureteral stone.       Jeanell Ganser, MD      [id number]  D:  2023 11:11  T:  2023 13:40  JOB #:  [id number]

## 2023-04-07 ENCOUNTER — TELEPHONE (OUTPATIENT)
Dept: FAMILY MEDICINE CLINIC | Age: 58
End: 2023-04-07

## 2023-04-11 ENCOUNTER — TELEPHONE (OUTPATIENT)
Dept: FAMILY MEDICINE CLINIC | Age: 58
End: 2023-04-11

## 2023-05-03 ENCOUNTER — VIRTUAL VISIT (OUTPATIENT)
Dept: FAMILY MEDICINE CLINIC | Age: 58
End: 2023-05-03
Payer: COMMERCIAL

## 2023-05-03 DIAGNOSIS — D50.9 IRON DEFICIENCY ANEMIA, UNSPECIFIED IRON DEFICIENCY ANEMIA TYPE: Primary | ICD-10-CM

## 2023-05-03 PROCEDURE — 99214 OFFICE O/P EST MOD 30 MIN: CPT | Performed by: NURSE PRACTITIONER

## 2023-05-03 RX ORDER — UREA 10 %
325 LOTION (ML) TOPICAL
Qty: 60 TABLET | Refills: 1 | Status: SHIPPED | OUTPATIENT
Start: 2023-05-03

## 2023-05-22 RX ORDER — TRIAMTERENE AND HYDROCHLOROTHIAZIDE 75; 50 MG/1; MG/1
1 TABLET ORAL DAILY
COMMUNITY
Start: 2023-03-30 | End: 2023-07-06 | Stop reason: SINTOL

## 2023-05-22 RX ORDER — FERROUS SULFATE 325(65) MG
325 TABLET ORAL
COMMUNITY
Start: 2023-05-03

## 2023-06-07 ENCOUNTER — TELEPHONE (OUTPATIENT)
Facility: CLINIC | Age: 58
End: 2023-06-07

## 2023-06-07 NOTE — TELEPHONE ENCOUNTER
Two patient Identification confirmed. Patient states Urologist D/c Maxide. Patient instructed to call back with name of fluid pill to be filled. HCTZ. Please advise. Patient notified may have to wait until Philomena Madrigal NP returns to office.

## 2023-06-07 NOTE — TELEPHONE ENCOUNTER
Patient was seen on 5/3/23 she states  that NP Walt Oviedo asked her to call back with the name of the fluid pill she taking and he would fill it for her.  Medication is HCTZ

## 2023-06-16 ENCOUNTER — TELEPHONE (OUTPATIENT)
Facility: CLINIC | Age: 58
End: 2023-06-16

## 2023-06-16 NOTE — TELEPHONE ENCOUNTER
Patient called asking if we received from her specialist asking to change her from Maxzide to HCTZ.   Please call patient

## 2023-06-19 ENCOUNTER — TELEPHONE (OUTPATIENT)
Facility: CLINIC | Age: 58
End: 2023-06-19

## 2023-07-06 ENCOUNTER — TELEMEDICINE (OUTPATIENT)
Facility: CLINIC | Age: 58
End: 2023-07-06
Payer: COMMERCIAL

## 2023-07-06 DIAGNOSIS — I10 ESSENTIAL (PRIMARY) HYPERTENSION: Primary | ICD-10-CM

## 2023-07-06 DIAGNOSIS — D50.8 IRON DEFICIENCY ANEMIA SECONDARY TO INADEQUATE DIETARY IRON INTAKE: ICD-10-CM

## 2023-07-06 PROCEDURE — 99214 OFFICE O/P EST MOD 30 MIN: CPT | Performed by: NURSE PRACTITIONER

## 2023-07-06 RX ORDER — HYDROCHLOROTHIAZIDE 25 MG/1
25 TABLET ORAL EVERY MORNING
Qty: 90 TABLET | Refills: 1 | Status: SHIPPED | OUTPATIENT
Start: 2023-07-06

## 2023-07-06 ASSESSMENT — PATIENT HEALTH QUESTIONNAIRE - PHQ9
SUM OF ALL RESPONSES TO PHQ QUESTIONS 1-9: 0
1. LITTLE INTEREST OR PLEASURE IN DOING THINGS: 0
SUM OF ALL RESPONSES TO PHQ QUESTIONS 1-9: 0
2. FEELING DOWN, DEPRESSED OR HOPELESS: 0
SUM OF ALL RESPONSES TO PHQ9 QUESTIONS 1 & 2: 0
SUM OF ALL RESPONSES TO PHQ QUESTIONS 1-9: 0
SUM OF ALL RESPONSES TO PHQ QUESTIONS 1-9: 0

## 2023-07-06 NOTE — PROGRESS NOTES
Assessment/Plan:     Michelle Carlson was seen today for discuss medications. Diagnoses and all orders for this visit:    Essential (primary) hypertension  -     hydroCHLOROthiazide (HYDRODIURIL) 25 MG tablet; Take 1 tablet by mouth every morning  -     Comprehensive Metabolic Panel; Future  Patient seen by virtual video. This was to discuss blood pressure. Patient recently has had kidney stone and analysis of stone showed it was a complete component of her previous blood pressure medication. Her urologist recommended just hydrochlorothiazide for blood pressure management. This has been reordered. We will start at 25 mg. Patient able to check blood pressure at home. Agrees to come in for labs in 1 month to check potassium. Will call sooner if blood pressure is not controlled at goal.  Iron deficiency anemia secondary to inadequate dietary iron intake  -     CBC; Future  -     Iron and TIBC; Future  We will also follow-up for iron deficiency anemia. No follow-up provider specified. Discussed expected course/resolution/complications of diagnosis in detail with patient. Medication risks/benefits/costs/interactions/alternatives discussed with patient. Pt was given after visit summary which includes diagnoses, current medications & vitals. Pt expressed understanding with the diagnosis and plan        Subjective:      Adrienne Rai is a 65 y.o. female who presents for had concerns including Discuss Medications (Discuss alternative ). Current Outpatient Medications   Medication Sig Dispense Refill    hydroCHLOROthiazide (HYDRODIURIL) 25 MG tablet Take 1 tablet by mouth every morning 90 tablet 1    ferrous sulfate (IRON 325) 325 (65 Fe) MG tablet Take 1 tablet by mouth every morning (before breakfast)       No current facility-administered medications for this visit.        Allergies   Allergen Reactions    Fentanyl Itching and Rash         Diagnosis Date    Anemia     History of vascular access

## 2023-07-06 NOTE — PROGRESS NOTES
1. Have you been to the ER, urgent care clinic since your last visit? Hospitalized since your last visit? No    2. Have you seen or consulted any other health care providers outside of the [address] [place] [address] since your last visit? Include any pap smears or colon screening. No    Chief Complaint   Patient presents with    Discuss Medications     Discuss alternative      Health Maintenance Due   Topic Date Due    COVID-19 Vaccine (1) Never done    HIV screen  Never done    DTaP/Tdap/Td vaccine (1 - Tdap) Never done    Colorectal Cancer Screen  Never done    Shingles vaccine (1 of 2) Never done     PHQ-9 Total Score: 0 (7/6/2023  8:09 AM)    AMB Abuse Screening 7/6/2023   Do you ever feel afraid of your partner? N   Are you in a relationship with someone who physically or mentally threatens you? N   Is it safe for you to go home?  Y     Patient-Reported Vitals 7/6/2023   Patient-Reported Weight 184lb   Patient-Reported Height 5'10   Patient-Reported Systolic -   Patient-Reported Diastolic -   Patient-Reported Pulse -

## 2023-09-15 ENCOUNTER — TELEPHONE (OUTPATIENT)
Age: 58
End: 2023-09-15

## 2024-05-28 ENCOUNTER — TRANSCRIBE ORDERS (OUTPATIENT)
Facility: HOSPITAL | Age: 59
End: 2024-05-28

## 2024-05-28 ENCOUNTER — HOSPITAL ENCOUNTER (OUTPATIENT)
Facility: HOSPITAL | Age: 59
Discharge: HOME | End: 2024-05-31
Payer: COMMERCIAL

## 2024-05-28 VITALS — HEIGHT: 70 IN | WEIGHT: 184 LBS | BODY MASS INDEX: 26.34 KG/M2

## 2024-05-28 DIAGNOSIS — Z12.31 OTHER SCREENING MAMMOGRAM: ICD-10-CM

## 2024-05-28 DIAGNOSIS — Z12.31 OTHER SCREENING MAMMOGRAM: Primary | ICD-10-CM

## 2024-05-28 PROCEDURE — 77063 BREAST TOMOSYNTHESIS BI: CPT

## 2024-06-14 ENCOUNTER — OFFICE VISIT (OUTPATIENT)
Age: 59
End: 2024-06-14
Payer: COMMERCIAL

## 2024-06-14 VITALS — DIASTOLIC BLOOD PRESSURE: 69 MMHG | WEIGHT: 212 LBS | SYSTOLIC BLOOD PRESSURE: 107 MMHG | BODY MASS INDEX: 30.42 KG/M2

## 2024-06-14 DIAGNOSIS — Z01.419 ENCOUNTER FOR GYNECOLOGICAL EXAMINATION (GENERAL) (ROUTINE) WITHOUT ABNORMAL FINDINGS: Primary | ICD-10-CM

## 2024-06-14 PROCEDURE — 3078F DIAST BP <80 MM HG: CPT | Performed by: OBSTETRICS & GYNECOLOGY

## 2024-06-14 PROCEDURE — 99396 PREV VISIT EST AGE 40-64: CPT | Performed by: OBSTETRICS & GYNECOLOGY

## 2024-06-14 PROCEDURE — 3074F SYST BP LT 130 MM HG: CPT | Performed by: OBSTETRICS & GYNECOLOGY

## 2024-06-14 SDOH — ECONOMIC STABILITY: INCOME INSECURITY: HOW HARD IS IT FOR YOU TO PAY FOR THE VERY BASICS LIKE FOOD, HOUSING, MEDICAL CARE, AND HEATING?: PATIENT DECLINED

## 2024-06-14 SDOH — ECONOMIC STABILITY: FOOD INSECURITY: WITHIN THE PAST 12 MONTHS, YOU WORRIED THAT YOUR FOOD WOULD RUN OUT BEFORE YOU GOT MONEY TO BUY MORE.: PATIENT DECLINED

## 2024-06-14 SDOH — ECONOMIC STABILITY: FOOD INSECURITY: WITHIN THE PAST 12 MONTHS, THE FOOD YOU BOUGHT JUST DIDN'T LAST AND YOU DIDN'T HAVE MONEY TO GET MORE.: PATIENT DECLINED

## 2024-06-14 SDOH — ECONOMIC STABILITY: HOUSING INSECURITY
IN THE LAST 12 MONTHS, WAS THERE A TIME WHEN YOU DID NOT HAVE A STEADY PLACE TO SLEEP OR SLEPT IN A SHELTER (INCLUDING NOW)?: PATIENT DECLINED

## 2024-06-14 ASSESSMENT — PATIENT HEALTH QUESTIONNAIRE - PHQ9
SUM OF ALL RESPONSES TO PHQ QUESTIONS 1-9: 0
2. FEELING DOWN, DEPRESSED OR HOPELESS: NOT AT ALL
SUM OF ALL RESPONSES TO PHQ QUESTIONS 1-9: 0
1. LITTLE INTEREST OR PLEASURE IN DOING THINGS: NOT AT ALL
SUM OF ALL RESPONSES TO PHQ9 QUESTIONS 1 & 2: 0

## 2024-06-14 NOTE — PROGRESS NOTES
Jasmine Villar is a 58 y.o. female returns for an annual exam     Chief Complaint   Patient presents with    Annual Exam       No LMP recorded. (Menstrual status: Menopause).  Her periods are absent  Problems: no problems  Birth Control: post menopausal status.  Last Pap: 5/19/2022 normal/HPV neg  She does not have a history of LORENZO 2, 3 or cervical cancer.   Last Mammogram: had a recent mammogram 5/28/2024 which was negative for malignancy.    Last colonoscopy: never        Examination chaperoned by Philomena Zamarripa MA.  
Problem List   Diagnosis    Essential hypertension    Gastroesophageal reflux disease without esophagitis    Weight gain, abnormal    Uterine leiomyoma    Cheloid scar    Status post gastric bypass for obesity    Nephrolithiasis    Obstruction of left ureteropelvic junction due to stone    Renal abscess, right    Hydronephrosis with urinary obstruction due to renal calculus         Review of Systems - History obtained from the patient  Constitutional: negative for weight loss, fever, night sweats  HEENT: negative for hearing loss, earache, congestion, snoring, sorethroat  CV: negative for chest pain, palpitations, edema  Resp: negative for cough, shortness of breath, wheezing  GI: negative for change in bowel habits, abdominal pain, black or bloody stools  : negative for frequency, dysuria, hematuria, vaginal discharge  MSK: negative for back pain, joint pain, muscle pain  Breast: negative for breast lumps, nipple discharge, galactorrhea  Skin :negative for itching, rash, hives  Neuro: negative for dizziness, headache, confusion, weakness  Psych: negative for anxiety, depression, change in mood  Heme/lymph: negative for bleeding, bruising, pallor    Physical Exam    /69   Wt 96.2 kg (212 lb)   BMI 30.42 kg/m²   Constitutional  Appearance: well-nourished, well developed, alert, in no acute distress    HENT  Head and Face: appears normal    Neck  Inspection/Palpation: normal appearance, no masses or tenderness  Lymph Nodes: no lymphadenopathy present  Thyroid: gland size normal, nontender, no nodules or masses present on palpation    Chest  Respiratory Effort: breathing normal  Auscultation: normal breath sounds    Cardiovascular  Heart:  Auscultation: regular rate and rhythm without murmur    Breasts  Inspection of Breasts: breasts symmetrical, no skin changes, no discharge present, nipple appearance normal, no skin retraction present  Palpation of Breasts and Axillae: no masses present on palpation, no

## 2025-05-15 ENCOUNTER — TRANSCRIBE ORDERS (OUTPATIENT)
Facility: HOSPITAL | Age: 60
End: 2025-05-15

## 2025-05-15 ENCOUNTER — ANESTHESIA EVENT (OUTPATIENT)
Facility: HOSPITAL | Age: 60
End: 2025-05-15
Payer: COMMERCIAL

## 2025-05-15 ENCOUNTER — HOSPITAL ENCOUNTER (OUTPATIENT)
Facility: HOSPITAL | Age: 60
Discharge: HOME | End: 2025-05-18
Payer: COMMERCIAL

## 2025-05-15 DIAGNOSIS — Z01.818 PREOP EXAMINATION: ICD-10-CM

## 2025-05-15 DIAGNOSIS — Z01.818 PREOP EXAMINATION: Primary | ICD-10-CM

## 2025-05-15 LAB
ALBUMIN SERPL-MCNC: 3.6 G/DL (ref 3.5–5)
ALBUMIN/GLOB SERPL: 0.8 (ref 1.1–2.2)
ALP SERPL-CCNC: 125 U/L (ref 45–117)
ALT SERPL-CCNC: 22 U/L (ref 12–78)
ANION GAP SERPL CALC-SCNC: 6 MMOL/L (ref 2–12)
AST SERPL W P-5'-P-CCNC: 14 U/L (ref 15–37)
BASOPHILS # BLD: 0.05 K/UL (ref 0–0.1)
BASOPHILS NFR BLD: 1 % (ref 0–1)
BILIRUB SERPL-MCNC: 0.5 MG/DL (ref 0.2–1)
BUN SERPL-MCNC: 16 MG/DL (ref 6–20)
BUN/CREAT SERPL: 25 (ref 12–20)
CA-I BLD-MCNC: 9.2 MG/DL (ref 8.5–10.1)
CHLORIDE SERPL-SCNC: 109 MMOL/L (ref 97–108)
CO2 SERPL-SCNC: 26 MMOL/L (ref 21–32)
CREAT SERPL-MCNC: 0.63 MG/DL (ref 0.55–1.02)
DIFFERENTIAL METHOD BLD: NORMAL
EOSINOPHIL # BLD: 0 K/UL (ref 0–0.4)
EOSINOPHIL NFR BLD: 0 % (ref 0–7)
ERYTHROCYTE [DISTWIDTH] IN BLOOD BY AUTOMATED COUNT: 12.4 % (ref 11.5–14.5)
GLOBULIN SER CALC-MCNC: 4.6 G/DL (ref 2–4)
GLUCOSE SERPL-MCNC: 65 MG/DL (ref 65–100)
HCT VFR BLD AUTO: 38.8 % (ref 35–47)
HGB BLD-MCNC: 12.5 G/DL (ref 11.5–16)
IMM GRANULOCYTES # BLD AUTO: 0 K/UL
IMM GRANULOCYTES NFR BLD AUTO: 0 %
INR PPP: 0.9 (ref 0.9–1.1)
LYMPHOCYTES # BLD: 1.55 K/UL (ref 0.8–3.5)
LYMPHOCYTES NFR BLD: 33 % (ref 12–49)
MCH RBC QN AUTO: 29.3 PG (ref 26–34)
MCHC RBC AUTO-ENTMCNC: 32.2 G/DL (ref 30–36.5)
MCV RBC AUTO: 91.1 FL (ref 80–99)
MONOCYTES # BLD: 0.33 K/UL (ref 0–1)
MONOCYTES NFR BLD: 7 % (ref 5–13)
NEUTS SEG # BLD: 2.77 K/UL (ref 1.8–8)
NEUTS SEG NFR BLD: 59 % (ref 32–75)
NRBC # BLD: 0 K/UL (ref 0–0.01)
NRBC BLD-RTO: 0 PER 100 WBC
PLATELET # BLD AUTO: 248 K/UL (ref 150–400)
PMV BLD AUTO: 10.3 FL (ref 8.9–12.9)
POTASSIUM SERPL-SCNC: 3.2 MMOL/L (ref 3.5–5.1)
PROT SERPL-MCNC: 8.2 G/DL (ref 6.4–8.2)
PROTHROMBIN TIME: 12.4 SEC (ref 11.9–14.6)
RBC # BLD AUTO: 4.26 M/UL (ref 3.8–5.2)
RBC MORPH BLD: NORMAL
SODIUM SERPL-SCNC: 141 MMOL/L (ref 136–145)
WBC # BLD AUTO: 4.7 K/UL (ref 3.6–11)

## 2025-05-15 PROCEDURE — 80053 COMPREHEN METABOLIC PANEL: CPT

## 2025-05-15 PROCEDURE — 85025 COMPLETE CBC W/AUTO DIFF WBC: CPT

## 2025-05-15 PROCEDURE — 36415 COLL VENOUS BLD VENIPUNCTURE: CPT

## 2025-05-15 PROCEDURE — 85610 PROTHROMBIN TIME: CPT

## 2025-05-16 ENCOUNTER — HOSPITAL ENCOUNTER (OUTPATIENT)
Facility: HOSPITAL | Age: 60
Setting detail: OUTPATIENT SURGERY
Discharge: HOME | End: 2025-05-16
Attending: PODIATRIST | Admitting: PODIATRIST
Payer: COMMERCIAL

## 2025-05-16 ENCOUNTER — ANESTHESIA (OUTPATIENT)
Facility: HOSPITAL | Age: 60
End: 2025-05-16
Payer: COMMERCIAL

## 2025-05-16 VITALS
OXYGEN SATURATION: 98 % | RESPIRATION RATE: 27 BRPM | TEMPERATURE: 97.7 F | SYSTOLIC BLOOD PRESSURE: 109 MMHG | HEART RATE: 67 BPM | DIASTOLIC BLOOD PRESSURE: 70 MMHG

## 2025-05-16 PROBLEM — M20.41 HAMMER TOE OF RIGHT FOOT: Status: ACTIVE | Noted: 2025-05-16

## 2025-05-16 PROCEDURE — 3600000002 HC SURGERY LEVEL 2 BASE: Performed by: PODIATRIST

## 2025-05-16 PROCEDURE — 3600000012 HC SURGERY LEVEL 2 ADDTL 15MIN: Performed by: PODIATRIST

## 2025-05-16 PROCEDURE — 6360000002 HC RX W HCPCS: Performed by: PODIATRIST

## 2025-05-16 PROCEDURE — 6360000002 HC RX W HCPCS: Performed by: NURSE ANESTHETIST, CERTIFIED REGISTERED

## 2025-05-16 PROCEDURE — 6370000000 HC RX 637 (ALT 250 FOR IP): Performed by: STUDENT IN AN ORGANIZED HEALTH CARE EDUCATION/TRAINING PROGRAM

## 2025-05-16 PROCEDURE — 7100000000 HC PACU RECOVERY - FIRST 15 MIN: Performed by: PODIATRIST

## 2025-05-16 PROCEDURE — 3700000000 HC ANESTHESIA ATTENDED CARE: Performed by: PODIATRIST

## 2025-05-16 PROCEDURE — 2580000003 HC RX 258: Performed by: NURSE ANESTHETIST, CERTIFIED REGISTERED

## 2025-05-16 PROCEDURE — 3700000001 HC ADD 15 MINUTES (ANESTHESIA): Performed by: PODIATRIST

## 2025-05-16 PROCEDURE — 2709999900 HC NON-CHARGEABLE SUPPLY: Performed by: PODIATRIST

## 2025-05-16 PROCEDURE — 7100000001 HC PACU RECOVERY - ADDTL 15 MIN: Performed by: PODIATRIST

## 2025-05-16 RX ORDER — SODIUM CHLORIDE 0.9 % (FLUSH) 0.9 %
5-40 SYRINGE (ML) INJECTION EVERY 12 HOURS SCHEDULED
Status: DISCONTINUED | OUTPATIENT
Start: 2025-05-16 | End: 2025-05-16 | Stop reason: HOSPADM

## 2025-05-16 RX ORDER — MIDAZOLAM HYDROCHLORIDE 1 MG/ML
INJECTION, SOLUTION INTRAMUSCULAR; INTRAVENOUS
Status: DISCONTINUED | OUTPATIENT
Start: 2025-05-16 | End: 2025-05-16 | Stop reason: SDUPTHER

## 2025-05-16 RX ORDER — CEFAZOLIN SODIUM 1 G/3ML
INJECTION, POWDER, FOR SOLUTION INTRAMUSCULAR; INTRAVENOUS
Status: DISCONTINUED | OUTPATIENT
Start: 2025-05-16 | End: 2025-05-16 | Stop reason: SDUPTHER

## 2025-05-16 RX ORDER — ACETAMINOPHEN 500 MG
1000 TABLET ORAL ONCE
Status: COMPLETED | OUTPATIENT
Start: 2025-05-16 | End: 2025-05-16

## 2025-05-16 RX ORDER — SODIUM CHLORIDE 9 MG/ML
INJECTION, SOLUTION INTRAVENOUS PRN
Status: DISCONTINUED | OUTPATIENT
Start: 2025-05-16 | End: 2025-05-16 | Stop reason: HOSPADM

## 2025-05-16 RX ORDER — HYDRALAZINE HYDROCHLORIDE 20 MG/ML
10 INJECTION INTRAMUSCULAR; INTRAVENOUS
Status: DISCONTINUED | OUTPATIENT
Start: 2025-05-16 | End: 2025-05-16 | Stop reason: HOSPADM

## 2025-05-16 RX ORDER — FENTANYL CITRATE 50 UG/ML
25 INJECTION, SOLUTION INTRAMUSCULAR; INTRAVENOUS EVERY 5 MIN PRN
Status: DISCONTINUED | OUTPATIENT
Start: 2025-05-16 | End: 2025-05-16 | Stop reason: HOSPADM

## 2025-05-16 RX ORDER — SODIUM CHLORIDE, SODIUM LACTATE, POTASSIUM CHLORIDE, CALCIUM CHLORIDE 600; 310; 30; 20 MG/100ML; MG/100ML; MG/100ML; MG/100ML
INJECTION, SOLUTION INTRAVENOUS
Status: DISCONTINUED | OUTPATIENT
Start: 2025-05-16 | End: 2025-05-16 | Stop reason: SDUPTHER

## 2025-05-16 RX ORDER — PROPOFOL 10 MG/ML
INJECTION, EMULSION INTRAVENOUS
Status: DISCONTINUED | OUTPATIENT
Start: 2025-05-16 | End: 2025-05-16 | Stop reason: SDUPTHER

## 2025-05-16 RX ORDER — ONDANSETRON 2 MG/ML
4 INJECTION INTRAMUSCULAR; INTRAVENOUS
Status: DISCONTINUED | OUTPATIENT
Start: 2025-05-16 | End: 2025-05-16 | Stop reason: HOSPADM

## 2025-05-16 RX ORDER — PHENYLEPHRINE HCL IN 0.9% NACL 0.4MG/10ML
SYRINGE (ML) INTRAVENOUS
Status: DISCONTINUED | OUTPATIENT
Start: 2025-05-16 | End: 2025-05-16 | Stop reason: SDUPTHER

## 2025-05-16 RX ORDER — NALOXONE HYDROCHLORIDE 0.4 MG/ML
INJECTION, SOLUTION INTRAMUSCULAR; INTRAVENOUS; SUBCUTANEOUS PRN
Status: DISCONTINUED | OUTPATIENT
Start: 2025-05-16 | End: 2025-05-16 | Stop reason: HOSPADM

## 2025-05-16 RX ORDER — OXYCODONE HYDROCHLORIDE 5 MG/1
5 TABLET ORAL
Status: DISCONTINUED | OUTPATIENT
Start: 2025-05-16 | End: 2025-05-16 | Stop reason: HOSPADM

## 2025-05-16 RX ORDER — KETOROLAC TROMETHAMINE 30 MG/ML
INJECTION, SOLUTION INTRAMUSCULAR; INTRAVENOUS
Status: DISCONTINUED | OUTPATIENT
Start: 2025-05-16 | End: 2025-05-16 | Stop reason: SDUPTHER

## 2025-05-16 RX ORDER — LIDOCAINE HYDROCHLORIDE 20 MG/ML
INJECTION, SOLUTION EPIDURAL; INFILTRATION; INTRACAUDAL; PERINEURAL
Status: DISCONTINUED | OUTPATIENT
Start: 2025-05-16 | End: 2025-05-16 | Stop reason: SDUPTHER

## 2025-05-16 RX ORDER — LIDOCAINE HYDROCHLORIDE 10 MG/ML
1 INJECTION, SOLUTION EPIDURAL; INFILTRATION; INTRACAUDAL; PERINEURAL
Status: DISCONTINUED | OUTPATIENT
Start: 2025-05-16 | End: 2025-05-16 | Stop reason: HOSPADM

## 2025-05-16 RX ORDER — DEXAMETHASONE SODIUM PHOSPHATE 4 MG/ML
INJECTION, SOLUTION INTRA-ARTICULAR; INTRALESIONAL; INTRAMUSCULAR; INTRAVENOUS; SOFT TISSUE
Status: DISCONTINUED | OUTPATIENT
Start: 2025-05-16 | End: 2025-05-16 | Stop reason: SDUPTHER

## 2025-05-16 RX ORDER — SODIUM CHLORIDE 0.9 % (FLUSH) 0.9 %
5-40 SYRINGE (ML) INJECTION PRN
Status: DISCONTINUED | OUTPATIENT
Start: 2025-05-16 | End: 2025-05-16 | Stop reason: HOSPADM

## 2025-05-16 RX ORDER — PROCHLORPERAZINE EDISYLATE 5 MG/ML
5 INJECTION INTRAMUSCULAR; INTRAVENOUS
Status: DISCONTINUED | OUTPATIENT
Start: 2025-05-16 | End: 2025-05-16 | Stop reason: HOSPADM

## 2025-05-16 RX ORDER — SODIUM CHLORIDE, SODIUM LACTATE, POTASSIUM CHLORIDE, CALCIUM CHLORIDE 600; 310; 30; 20 MG/100ML; MG/100ML; MG/100ML; MG/100ML
INJECTION, SOLUTION INTRAVENOUS CONTINUOUS
Status: DISCONTINUED | OUTPATIENT
Start: 2025-05-16 | End: 2025-05-16 | Stop reason: HOSPADM

## 2025-05-16 RX ORDER — FENTANYL CITRATE 50 UG/ML
100 INJECTION, SOLUTION INTRAMUSCULAR; INTRAVENOUS
Status: DISCONTINUED | OUTPATIENT
Start: 2025-05-16 | End: 2025-05-16 | Stop reason: HOSPADM

## 2025-05-16 RX ORDER — HYDROMORPHONE HYDROCHLORIDE 1 MG/ML
0.5 INJECTION, SOLUTION INTRAMUSCULAR; INTRAVENOUS; SUBCUTANEOUS EVERY 5 MIN PRN
Status: DISCONTINUED | OUTPATIENT
Start: 2025-05-16 | End: 2025-05-16 | Stop reason: HOSPADM

## 2025-05-16 RX ORDER — DEXMEDETOMIDINE HYDROCHLORIDE 100 UG/ML
INJECTION, SOLUTION INTRAVENOUS
Status: DISCONTINUED | OUTPATIENT
Start: 2025-05-16 | End: 2025-05-16

## 2025-05-16 RX ORDER — MIDAZOLAM HYDROCHLORIDE 2 MG/2ML
2 INJECTION, SOLUTION INTRAMUSCULAR; INTRAVENOUS PRN
Status: DISCONTINUED | OUTPATIENT
Start: 2025-05-16 | End: 2025-05-16 | Stop reason: HOSPADM

## 2025-05-16 RX ORDER — ONDANSETRON 2 MG/ML
INJECTION INTRAMUSCULAR; INTRAVENOUS
Status: DISCONTINUED | OUTPATIENT
Start: 2025-05-16 | End: 2025-05-16 | Stop reason: SDUPTHER

## 2025-05-16 RX ADMIN — ONDANSETRON 4 MG: 2 INJECTION, SOLUTION INTRAMUSCULAR; INTRAVENOUS at 09:13

## 2025-05-16 RX ADMIN — KETOROLAC TROMETHAMINE 30 MG: 30 INJECTION, SOLUTION INTRAMUSCULAR; INTRAVENOUS at 10:18

## 2025-05-16 RX ADMIN — PROPOFOL 150 MG: 10 INJECTION, EMULSION INTRAVENOUS at 09:05

## 2025-05-16 RX ADMIN — MIDAZOLAM 2 MG: 1 INJECTION INTRAMUSCULAR; INTRAVENOUS at 08:57

## 2025-05-16 RX ADMIN — Medication 80 MCG: at 09:29

## 2025-05-16 RX ADMIN — LIDOCAINE HYDROCHLORIDE 40 MG: 20 INJECTION, SOLUTION EPIDURAL; INFILTRATION; INTRACAUDAL; PERINEURAL at 09:05

## 2025-05-16 RX ADMIN — Medication 80 MCG: at 10:16

## 2025-05-16 RX ADMIN — ACETAMINOPHEN 1000 MG: 500 TABLET ORAL at 10:53

## 2025-05-16 RX ADMIN — CEFAZOLIN 1 G: 330 INJECTION, POWDER, FOR SOLUTION INTRAMUSCULAR; INTRAVENOUS at 09:15

## 2025-05-16 RX ADMIN — SODIUM CHLORIDE, POTASSIUM CHLORIDE, SODIUM LACTATE AND CALCIUM CHLORIDE: 600; 310; 30; 20 INJECTION, SOLUTION INTRAVENOUS at 08:54

## 2025-05-16 RX ADMIN — DEXAMETHASONE SODIUM PHOSPHATE 4 MG: 4 INJECTION INTRA-ARTICULAR; INTRALESIONAL; INTRAMUSCULAR; INTRAVENOUS; SOFT TISSUE at 09:13

## 2025-05-16 ASSESSMENT — PAIN SCALES - GENERAL
PAINLEVEL_OUTOF10: 4
PAINLEVEL_OUTOF10: 0
PAINLEVEL_OUTOF10: 0
PAINLEVEL_OUTOF10: 7

## 2025-05-16 ASSESSMENT — PAIN DESCRIPTION - LOCATION
LOCATION: HEAD
LOCATION: HEAD

## 2025-05-16 ASSESSMENT — PAIN DESCRIPTION - DESCRIPTORS
DESCRIPTORS: ACHING
DESCRIPTORS: ACHING

## 2025-05-16 NOTE — POST SEDATION
Sedation Post Procedure Note    Patient Name: Jasmine Villar   YOB: 1965  Room/Bed: ASU/  Medical Record Number: [id number]  Date: 5/16/2025   Time: 11:03 AM         Physicians/Assistants: Nehemias Zuñiga MD, MD    Procedure Performed:  ***    Post-Sedation Vital Signs:  Vitals:    05/16/25 1036   BP:    Pulse: 73   Resp: 18   Temp:    SpO2: 100%      {POST SEDATION VITAL SIGNS:42440}            Post-Sedation Exam: {POST SEDATION EXAM:76490}           Complications: {COMPLICATIONS:39896}    Electronically signed by Nehemias Zuñiga MD on 5/16/2025 at 11:03 AM

## 2025-05-16 NOTE — BRIEF OP NOTE
Brief Postoperative Note      Patient: Jasmine Villar  YOB: 1965  MRN: [id number]    Date of Procedure: 5/16/2025    Pre-Op Diagnosis Codes:      * Capsulitis of metatarsophalangeal (MTP) joint of right foot [M77.51]     * Hammer toe of right foot [M20.41]     * Pain in joint of right foot [M25.571]    Post-Op Diagnosis: Same        Procedure(s):  RIGHT HAMMER TOE CORRECTION THIRD AND FOURTH DIGIT WITH CAPSULOTOMY AND TENOTOMY RIGHT METATARSAL PHALANGEAL JOINT WITH PIN FIXATION    Surgeon(s):  Nehemias Zuñiga MD    Assistant:  * No surgical staff found *    Anesthesia: General    Estimated Blood Loss (mL): Minimal    Complications: None    Specimens:   * No specimens in log *    Implants:  * No implants in log *      Drains: * No LDAs found *    Findings:  Infection Present At Time Of Surgery (PATOS) (choose all levels that have infection present):  none  Other Findings: none    Electronically signed by Nehemias Zuñiga MD on 5/16/2025 at 10:54 AM

## 2025-05-16 NOTE — PERIOP NOTE
I have reviewed discharge instructions with the  patient and daughter Anisha..  The  patient and daughter Anisha verbalized understanding. All questions addressed at this time. A paper copy of these instructions have been given to the patient to take home.

## 2025-05-16 NOTE — PERIOP NOTE
10:45  Dr. Zuñiga at bedside.  Per Dr. Zuñiga, patient \"is weight bearing as tolerated status and does not need crutches\".    Patient discharged with post-op shoe.

## 2025-05-16 NOTE — OP NOTE
[address]  [address]                            OPERATIVE REPORT      PATIENT NAME: ISABELLA CABRERA              : 1965  MED REC NO: [id number]                       ROOM: [facility]  ACCOUNT NO: [id number]                       ADMIT DATE: 2025  PROVIDER: Nehemias Zuñiga MD    DATE OF SERVICE:  2025    PREOPERATIVE DIAGNOSES:  Chronic and painful hammertoe deformities of the right 3rd and 4th digits along with contracture of the right 3rd digit at the metatarsophalangeal joint.    POSTOPERATIVE DIAGNOSES:  Chronic and painful hammertoe deformities of the right 3rd and 4th digits along with contracture of the right 3rd digit at the metatarsophalangeal joint.    PROCEDURES PERFORMED:  Arthroplasty at the proximal interphalangeal joint of the right 3rd and 4th digits along with capsulotomy and tenotomy at the right 3rd metatarsophalangeal joint.    SURGEON:  Nehemias Zuñiga MD    ASSISTANT:  First assistant:  None.    Second assistant:  None.    ANESTHESIA:  Marcaine and xylocaine mixture along with IV sedation.  The anesthetists were Dr. Zuñiga along with the nurse anesthetist.  The scrub nurse was Madhuri, circular nurse was Keven.    ESTIMATED BLOOD LOSS:  0 mL.    SPECIMENS REMOVED:  ***    INTRAOPERATIVE FINDINGS:  ***     COMPLICATIONS:  None.    IMPLANTS:  *** K-wire into the right 3rd and 4th digits.    INDICATIONS:  This 59-year-old female presents with history of very chronic and painful hammertoe deformities of the right 3rd and 4th digits.  The patient states the condition has been happening for a number of years causing her debilitation and pain upon ambulating and wearing shoes.  Conservative therapy such as wider fitting shoes, use of NSAIDs, over-the-counter inserts and supports have not remedied her condition, asymptomatic.  The patient elects on surgical intervention.    DESCRIPTION OF PROCEDURE:  After there were no  Attention was directed to the right 4th toe where similar procedure was performed.  The ostomy site then fixated with a 0.45 K-wire.  The wire tip was then cut and bent dorsally and a wire cap was applied.  All wounds were thoroughly flushed and irrigated with saline solution.  The foot was then loaded plantarly.  Excellent correction was noted with alignment of the right 3rd and 4th toes with resolution of their hammertoe deformity.  The capsular structure was then approximated using 4-0 Vicryl in simple interrupted type fashion and the subcutaneous tissues coapted using 4-0 Vicryl in horizontal mattress.  The skin was closed with 5-0 nylon in horizontal type mattress sutures.  Approximately 0.5 mL of dexamethasone mixed with 2 mL of 0.5% Marcaine was instilled for the wounds.  Dressings of Xeroform gauze, saturated 3 x 3's Betadine, dry sterile 3 x3, 3-inch Yo, 3-inch bandage.  The tourniquet was released and perfusion to all digits was noted.  The patient tolerated the anesthesia and procedure well, left the operating room to the recovery in fine condition where she advised on postop care i.e., isolation for 17 hours, pain medications Percocet approximately 30, 5/325 mg 1 tablet q.4 hours p.r.n. pain as needed.  The patient advised on strict postop instructions and will follow up in the office in 3 days.    TOURNIQUET:  Ankle tourniquet infiltrated to 250 mmHg pressure.  Tourniquet time approximately 56 minutes.        SARTHAK GUARDADO MD      NP/CLAUDETTES  D:  05/16/2025 11:19:06  T:  05/16/2025 16:46:18  JOB #:  [id number]    CC:   Dr. Blancas

## 2025-05-16 NOTE — ANESTHESIA POSTPROCEDURE EVALUATION
Post-Anesthesia Evaluation and Assessment    Patient: Jasmine Villar MRN: [id number]  SSN: [id number]    YOB: 1965  Age: 59 y.o.  Sex: female      I have evaluated the patient and they are stable and ready for discharge from the PACU.     Cardiovascular Function/Vital Signs  Visit Vitals  /71   Pulse 73   Temp 97.7 °F (36.5 °C) (Temporal)   Resp 18   SpO2 100%       Patient is status post General anesthesia for Procedure(s) with comments:  RIGHT HAMMER TOE CORRECTION THIRD AND FOURTH DIGIT WITH CAPSULOTOMY AND TENOTOMY RIGHT METATARSAL PHALANGEAL JOINT WITH PIN FIXATION - CLOSED AROUND K.WIRES.    Nausea/Vomiting: None    Postoperative hydration reviewed and adequate.    Pain:  Managed    Neurological Status:   At baseline    Mental Status, Level of Consciousness: Alert and  oriented to person, place, and time    Pulmonary Status:   Adequate oxygenation and airway patent    Complications related to anesthesia: None    Post-anesthesia assessment completed. No concerns    Signed By: Kalani Cooney DO     May 16, 2025

## 2025-05-16 NOTE — ANESTHESIA PRE PROCEDURE
Department of Anesthesiology  Preprocedure Note       Name:  Jasmine Villar   Age:  59 y.o.  :  1965                                          MRN:  [id number]         Date:  2025      Surgeon: Surgeon(s):  Nehemias Zuñiga MD    Procedure: Procedure(s):  RIGHT HAMMER TOE CORRECTION THIRD AND FOURTH DIGIT WITH CAPSULOTOMY AND TENOTOMY RIGHT METATARSAL PHALANGEAL JOINT    Medications prior to admission:   Prior to Admission medications    Medication Sig Start Date End Date Taking? Authorizing Provider   hydroCHLOROthiazide (HYDRODIURIL) 25 MG tablet Take 1 tablet by mouth every morning 23   Randall Zamarripa APRN - NP   ferrous sulfate (IRON 325) 325 (65 Fe) MG tablet Take 1 tablet by mouth every morning (before breakfast) 5/3/23   Automatic Reconciliation, Ar       Current medications:    Current Facility-Administered Medications   Medication Dose Route Frequency Provider Last Rate Last Admin   • BUPivacaine (PF) (MARCAINE) 0.5 % 10 mL, lidocaine PF 1 % 10 mL    PRN Nehemias Zuñiga MD   20 mL at 25 0740       Allergies:    Allergies   Allergen Reactions   • Fentanyl Itching and Rash       Problem List:    Patient Active Problem List   Diagnosis Code   • Essential hypertension I10   • Gastroesophageal reflux disease without esophagitis K21.9   • Weight gain, abnormal R63.5   • Uterine leiomyoma D25.9   • Cheloid scar L91.0   • Status post gastric bypass for obesity Z98.84   • Nephrolithiasis N20.0   • Obstruction of left ureteropelvic junction due to stone N20.1   • Renal abscess, right N15.1   • Hydronephrosis with urinary obstruction due to renal calculus N13.2       Past Medical History:        Diagnosis Date   • Anemia    • History of vascular access device 2023    4 Citizen of the [place] single lumen PICC line right basilic 38 cm 1 cm out   • Hypertension    • Menorrhagia        Past Surgical History:        Procedure Laterality Date   • BUNIONECTOMY Bilateral    •  SECTION     • GASTRIC BYPASS

## 2025-05-16 NOTE — DISCHARGE INSTR - DIET

## 2025-05-16 NOTE — DISCHARGE INSTRUCTIONS
INSTRUCTIONS FOLLOWING FOOT SURGERY    ACTIVITY:  Elevate feet for 48 hours  Use ice as directed by your doctor  Use crutches / walker as directed by your doctor, and follow your doctors instructions as to your weight bearing status    DIET:  Clear liquids until no nausea or vomiting; then light diet for the first day  An advance to regular diet on second day, unless your doctor orders otherwise.    PAIN:  Take pain medication as directed by your doctor.  Call your doctor if pain is not relieved by medication.  Do not take aspirin or blood thinners until directed by your doctor.    DRESSING CARE: keep dressing clean and dry, do not remove       FOLLOW-UP PHONE CALLS:  Calls will be made by nursing staff.  If you had any problems, call your doctor as needed.    CALL YOUR DOCTOR IF:  Excessive bleeding that does not stop after holding mild pressure over the area  Temperature of 101° F or above  Redness, excessive swelling or bruising, and/or green or yellow, smelly discharge from incision  Loss of sensation -cold, white, or blue toes    AFTER ANESTHESIA :   For the first 24 hours: do not drive, drink alcoholic beverages, or make important decisions.  Be aware of dizziness following anesthesia and while taking pain medication.      DISCHARGE MEDICATIONS:   Current Discharge Medication List             APPOINTMENT DATE/TIME: please call for an appointment    YOUR DOCTOR’S PHONE NUMBER: [phone]    Patient’s signature:  Date: 5/16/2025  Discharging Nurse:       You received IV Toradol during your procedure today at . This medication is similar to Motrin/ibuprofen. Please do not take any additional Motrin/ibuprofen for 6-8 hours, or no sooner than 6:00p.m.

## (undated) DEVICE — PADDING UNDERCAST W3INXL12FT RAYON POLY SYN NONADHESIVE

## (undated) DEVICE — GLOVE ORANGE PI 7 1/2   MSG9075

## (undated) DEVICE — EXTREMITY - SMH: Brand: MEDLINE INDUSTRIES, INC.

## (undated) DEVICE — SOLUTION SCRB 2OZ 10% POVIDONE IOD ANTIMIC BTL

## (undated) DEVICE — SOLUTION IRRIGATION H2O 0797305] ICU MEDICAL INC]

## (undated) DEVICE — CYSTO-SFMC: Brand: MEDLINE INDUSTRIES, INC.

## (undated) DEVICE — OPEN-END FLEXI-TIP URETERAL CATHETER: Brand: FLEXI-TIP

## (undated) DEVICE — KIT SURG PREP POVIDONE IOD PRESATURATED PAINT WET FOR UNIV

## (undated) DEVICE — INTENT OT USE PROVIDES A STERILE INTERFACE BETWEEN THE OPERATING ROOM SURGICAL LAMPS (NON-STERILE) AND THE SURGEON OR STAFF WORKING IN THE STERILE FIELD.: Brand: ASPEN® ALC PLUS LIGHT HANDLE COVER

## (undated) DEVICE — SUTURE ETHILON SZ 5-0 L18IN NONABSORBABLE BLK L19MM PS-2 3/8 1666G

## (undated) DEVICE — STOCKINETTE,DOUBLE PLY,4X48,STERILE: Brand: MEDLINE

## (undated) DEVICE — Device: Brand: JELCO

## (undated) DEVICE — SYRINGE MED 10ML LUERLOCK TIP W/O SFTY DISP

## (undated) DEVICE — GARMENT,MEDLINE,DVT,INT,CALF,MED, GEN2: Brand: MEDLINE

## (undated) DEVICE — C-WIRE PAK DOUBLE ENDED ORTHOPAEDIC WIRE, TROCAR, .045" (1.14 MM): Brand: C-WIRE

## (undated) DEVICE — SOLUTION IRRIG 1000ML STRL H2O USP PLAS POUR BTL

## (undated) DEVICE — BANDAGE COMPR W3INXL5YD WHT BGE POLY COT M E WRP WV HK AND

## (undated) DEVICE — BLADE,CARBON-STEEL,15,STRL,DISPOSABLE,TB: Brand: MEDLINE

## (undated) DEVICE — SYRINGE,EAR/ULCER, 2 OZ, STERILE: Brand: MEDLINE

## (undated) DEVICE — SUTURE VICRYL + SZ 3-0 L27IN ABSRB UD L26MM SH 1/2 CIR VCP416H

## (undated) DEVICE — GOWN,SIRUS,FABRNF,XL,20/CS: Brand: MEDLINE

## (undated) DEVICE — BANDAGE,GAUZE,CONFORMING,3"X75",STRL,LF: Brand: MEDLINE

## (undated) DEVICE — BANDAGE,ELASTIC,ESMARK,STERILE,4"X9',LF: Brand: MEDLINE

## (undated) DEVICE — SUTURE VICRYL + SZ 3-0 L27IN ABSRB UD FS2 3/8 CIR REV CUT

## (undated) DEVICE — SYRINGE MED 5ML STD CLR PLAS LUERLOCK TIP N CTRL DISP

## (undated) DEVICE — Device

## (undated) DEVICE — SUTURE VICRYL SZ 4-0 L27IN ABSRB UD L26MM SH 1/2 CIR J415H

## (undated) DEVICE — SUTURE VICRYL COAT SZ 4-0 L18IN ABSRB UD L19MM PS-2 1/2 CIR J496G

## (undated) DEVICE — ZIMMER® STERILE DISPOSABLE TOURNIQUET CUFF WITH PLC, DUAL PORT, SINGLE BLADDER, 18 IN. (46 CM)

## (undated) DEVICE — DRESSING,GAUZE,XEROFORM,CURAD,1"X8",ST: Brand: CURAD

## (undated) DEVICE — X-RAY DETECTABLE SPONGES,16 PLY: Brand: VISTEC

## (undated) DEVICE — GUARD PIN DIA0.045IN WHT REM SL FOR K WIRE STNMN MINIMUM ORDER 2 BX

## (undated) DEVICE — HYPODERMIC SAFETY NEEDLE: Brand: MONOJECT

## (undated) DEVICE — SPONGE GZ W4XL4IN COT 12 PLY TYP VII WVN C FLD DSGN STERILE

## (undated) DEVICE — RASP SURG W5.5XL11MM REPL RECIP SM TEAR CRSS CUT STRYKR FOR

## (undated) DEVICE — TUBING, SUCTION, 1/4" X 12', STRAIGHT: Brand: MEDLINE

## (undated) DEVICE — PRECISION THIN, OFFSET (5.5 X 0.38 X 25.0MM)